# Patient Record
Sex: FEMALE | Race: WHITE | NOT HISPANIC OR LATINO | URBAN - METROPOLITAN AREA
[De-identification: names, ages, dates, MRNs, and addresses within clinical notes are randomized per-mention and may not be internally consistent; named-entity substitution may affect disease eponyms.]

---

## 2018-10-12 ENCOUNTER — EMERGENCY (EMERGENCY)
Facility: HOSPITAL | Age: 83
LOS: 1 days | Discharge: HOME | End: 2018-10-12
Attending: EMERGENCY MEDICINE | Admitting: EMERGENCY MEDICINE
Payer: MEDICARE

## 2018-10-12 VITALS
HEART RATE: 88 BPM | WEIGHT: 164.91 LBS | DIASTOLIC BLOOD PRESSURE: 76 MMHG | SYSTOLIC BLOOD PRESSURE: 181 MMHG | RESPIRATION RATE: 18 BRPM | TEMPERATURE: 98 F | OXYGEN SATURATION: 100 %

## 2018-10-12 VITALS
SYSTOLIC BLOOD PRESSURE: 108 MMHG | HEART RATE: 93 BPM | RESPIRATION RATE: 17 BRPM | TEMPERATURE: 99 F | OXYGEN SATURATION: 96 % | DIASTOLIC BLOOD PRESSURE: 67 MMHG

## 2018-10-12 DIAGNOSIS — R03.0 ELEVATED BLOOD-PRESSURE READING, WITHOUT DIAGNOSIS OF HYPERTENSION: ICD-10-CM

## 2018-10-12 DIAGNOSIS — E78.5 HYPERLIPIDEMIA, UNSPECIFIED: ICD-10-CM

## 2018-10-12 DIAGNOSIS — R41.3 OTHER AMNESIA: ICD-10-CM

## 2018-10-12 DIAGNOSIS — R53.1 WEAKNESS: ICD-10-CM

## 2018-10-12 LAB
ALBUMIN SERPL ELPH-MCNC: 4.6 G/DL — SIGNIFICANT CHANGE UP (ref 3.3–5)
ALP SERPL-CCNC: 66 U/L — SIGNIFICANT CHANGE UP (ref 40–120)
ALT FLD-CCNC: 11 U/L — SIGNIFICANT CHANGE UP (ref 10–45)
ANION GAP SERPL CALC-SCNC: 15 MMOL/L — SIGNIFICANT CHANGE UP (ref 5–17)
APTT BLD: 36.1 SEC — SIGNIFICANT CHANGE UP (ref 27.5–37.4)
AST SERPL-CCNC: 17 U/L — SIGNIFICANT CHANGE UP (ref 10–40)
BASOPHILS NFR BLD AUTO: 0.6 % — SIGNIFICANT CHANGE UP (ref 0–2)
BILIRUB SERPL-MCNC: 0.2 MG/DL — SIGNIFICANT CHANGE UP (ref 0.2–1.2)
BUN SERPL-MCNC: 19 MG/DL — SIGNIFICANT CHANGE UP (ref 7–23)
CALCIUM SERPL-MCNC: 9.4 MG/DL — SIGNIFICANT CHANGE UP (ref 8.4–10.5)
CHLORIDE SERPL-SCNC: 98 MMOL/L — SIGNIFICANT CHANGE UP (ref 96–108)
CK MB CFR SERPL CALC: 1.7 NG/ML — SIGNIFICANT CHANGE UP (ref 0–6.7)
CK SERPL-CCNC: 79 U/L — SIGNIFICANT CHANGE UP (ref 25–170)
CO2 SERPL-SCNC: 27 MMOL/L — SIGNIFICANT CHANGE UP (ref 22–31)
CREAT SERPL-MCNC: 0.8 MG/DL — SIGNIFICANT CHANGE UP (ref 0.5–1.3)
EOSINOPHIL NFR BLD AUTO: 2.7 % — SIGNIFICANT CHANGE UP (ref 0–6)
GLUCOSE SERPL-MCNC: 106 MG/DL — HIGH (ref 70–99)
HCT VFR BLD CALC: 37.3 % — SIGNIFICANT CHANGE UP (ref 34.5–45)
HGB BLD-MCNC: 12 G/DL — SIGNIFICANT CHANGE UP (ref 11.5–15.5)
INR BLD: 0.96 — SIGNIFICANT CHANGE UP (ref 0.88–1.16)
LYMPHOCYTES # BLD AUTO: 22.9 % — SIGNIFICANT CHANGE UP (ref 13–44)
MCHC RBC-ENTMCNC: 28 PG — SIGNIFICANT CHANGE UP (ref 27–34)
MCHC RBC-ENTMCNC: 32.2 G/DL — SIGNIFICANT CHANGE UP (ref 32–36)
MCV RBC AUTO: 87.1 FL — SIGNIFICANT CHANGE UP (ref 80–100)
MONOCYTES NFR BLD AUTO: 11.6 % — SIGNIFICANT CHANGE UP (ref 2–14)
NEUTROPHILS NFR BLD AUTO: 62.2 % — SIGNIFICANT CHANGE UP (ref 43–77)
PLATELET # BLD AUTO: 260 K/UL — SIGNIFICANT CHANGE UP (ref 150–400)
POTASSIUM SERPL-MCNC: 3.7 MMOL/L — SIGNIFICANT CHANGE UP (ref 3.5–5.3)
POTASSIUM SERPL-SCNC: 3.7 MMOL/L — SIGNIFICANT CHANGE UP (ref 3.5–5.3)
PROT SERPL-MCNC: 7.6 G/DL — SIGNIFICANT CHANGE UP (ref 6–8.3)
PROTHROM AB SERPL-ACNC: 10.7 SEC — SIGNIFICANT CHANGE UP (ref 9.8–12.7)
RBC # BLD: 4.28 M/UL — SIGNIFICANT CHANGE UP (ref 3.8–5.2)
RBC # FLD: 15 % — SIGNIFICANT CHANGE UP (ref 10.3–16.9)
SODIUM SERPL-SCNC: 140 MMOL/L — SIGNIFICANT CHANGE UP (ref 135–145)
TROPONIN T SERPL-MCNC: <0.01 NG/ML — SIGNIFICANT CHANGE UP (ref 0–0.01)
WBC # BLD: 5.2 K/UL — SIGNIFICANT CHANGE UP (ref 3.8–10.5)
WBC # FLD AUTO: 5.2 K/UL — SIGNIFICANT CHANGE UP (ref 3.8–10.5)

## 2018-10-12 PROCEDURE — 85730 THROMBOPLASTIN TIME PARTIAL: CPT

## 2018-10-12 PROCEDURE — 70450 CT HEAD/BRAIN W/O DYE: CPT | Mod: 26,59

## 2018-10-12 PROCEDURE — 70496 CT ANGIOGRAPHY HEAD: CPT

## 2018-10-12 PROCEDURE — 99284 EMERGENCY DEPT VISIT MOD MDM: CPT | Mod: 25

## 2018-10-12 PROCEDURE — 93005 ELECTROCARDIOGRAM TRACING: CPT

## 2018-10-12 PROCEDURE — 82550 ASSAY OF CK (CPK): CPT

## 2018-10-12 PROCEDURE — 36415 COLL VENOUS BLD VENIPUNCTURE: CPT

## 2018-10-12 PROCEDURE — 93010 ELECTROCARDIOGRAM REPORT: CPT

## 2018-10-12 PROCEDURE — 70496 CT ANGIOGRAPHY HEAD: CPT | Mod: 26

## 2018-10-12 PROCEDURE — 82553 CREATINE MB FRACTION: CPT

## 2018-10-12 PROCEDURE — 85025 COMPLETE CBC W/AUTO DIFF WBC: CPT

## 2018-10-12 PROCEDURE — 70450 CT HEAD/BRAIN W/O DYE: CPT

## 2018-10-12 PROCEDURE — 80053 COMPREHEN METABOLIC PANEL: CPT

## 2018-10-12 PROCEDURE — 70498 CT ANGIOGRAPHY NECK: CPT | Mod: 26

## 2018-10-12 PROCEDURE — 85610 PROTHROMBIN TIME: CPT

## 2018-10-12 PROCEDURE — 84484 ASSAY OF TROPONIN QUANT: CPT

## 2018-10-12 PROCEDURE — 70498 CT ANGIOGRAPHY NECK: CPT

## 2018-10-12 RX ORDER — SODIUM CHLORIDE 9 MG/ML
1000 INJECTION INTRAMUSCULAR; INTRAVENOUS; SUBCUTANEOUS ONCE
Qty: 0 | Refills: 0 | Status: COMPLETED | OUTPATIENT
Start: 2018-10-12 | End: 2018-10-12

## 2018-10-12 RX ADMIN — SODIUM CHLORIDE 1000 MILLILITER(S): 9 INJECTION INTRAMUSCULAR; INTRAVENOUS; SUBCUTANEOUS at 16:12

## 2018-10-12 NOTE — ED PROVIDER NOTE - OBJECTIVE STATEMENT
85 y/o f with PMH of reflux, HLD, prior TIA presents to ED with elevated blood pressure associated with "feeling unwell".  As per patient for past five days, she has had memory issues and feels like being in a fog.  Denies headache, posterior neck pain, nausea, vomiting, extremity weakness, numbness.  Pt seen by audiologist today regarding hearing aids and requested her blood pressure to be taken and found it to be elevated and referred to ED for further mgmt.  No chest pain or shortness of breath.

## 2018-10-12 NOTE — PROGRESS NOTE ADULT - SUBJECTIVE AND OBJECTIVE BOX
HPI: 85 yo woman with long history of anxiety adn depression was at ENT today and was found to be hypertensive to 200 systolic and a rapid response was called.  I spoke with the anesthesiologist at [facility] who found no neurological deficit but wanted her to go to the ER.     In the ER /67 consistent with her range  Head CT no acute event and unchanged from multiple other scans in the past    She has seen neurology in the past for ?TIA's and takes aspirin which she continues to take.    She reports significant anxiety and lonliness since the death of her .    PAST MEDICAL & SURGICAL HISTORY:  anxiety  Depression  hyperlpiidemia    Meds  lexapro 20mg  buproprion 75mg  trazadone 100mg qhs  clonipin prn anxiety  Lipitor    Vital Signs Last 24 Hrs  T(C): 37.1 (12 Oct 2018 17:55), Max: 37.1 (12 Oct 2018 17:55)  T(F): 98.7 (12 Oct 2018 17:55), Max: 98.7 (12 Oct 2018 17:55)  HR: 93 (12 Oct 2018 17:55) (88 - 93)  BP: 108/67 (12 Oct 2018 17:55) (108/67 - 202/90)  BP(mean): --  RR: 17 (12 Oct 2018 17:55) (17 - 18)  SpO2: 96% (12 Oct 2018 17:55) (96% - 100%)    NAD  No JVD  Chest clear  CV RRR s1s2 no murmur  Abd soft  Ext no edema   Neuro non-focal    Impression: Moderate microvascular diseaseno evidence of acute   intracranial hemorrhage or acute transcortical infarction.     Impression: No evidence of carotid stenosis.   Anterolisthesis of C7 on T1.  Right apical opacification that may represent atelectasis or pneumonia.  Mild loss of height of the superior endplate of T2 and T4 vertebral   bodies and moderate loss of height of T5 and T6 vertebral bodies   consistent with compression fractures, age indeterminate.                     12.0   5.2   )-----------( 260      ( 12 Oct 2018 15:02 )             37.3   10-12    140  |  98  |  19  ----------------------------<  106<H>  3.7   |  27  |  0.80    Ca    9.4      12 Oct 2018 15:02    TPro  7.6  /  Alb  4.6  /  TBili  0.2  /  DBili  x   /  AST  17  /  ALT  11  /  AlkPhos  66  10-12

## 2018-10-12 NOTE — ED ADULT NURSE NOTE - CHPI ED NUR SYMPTOMS NEG
no fever/no nausea/no confusion/no blurred vision/no change in level of consciousness/no loss of consciousness/no numbness/no vomiting/no weakness/no dizziness

## 2018-10-12 NOTE — ED PROVIDER NOTE - PROGRESS NOTE DETAILS
Colleen: CTs with no emergent findings. No cough or leukocytosis to suggest pna, no indication for abx at this time. BP normalized without intervention. Pt seen by REJI Paul for DC with outpt follow up. The patient is stable for DC. They were advised to call their PMD for prompt outpatient follow up. Return precautions were discussed. The patient was advised to return to the ER for any concerning or worsening symptoms.

## 2018-10-12 NOTE — PROGRESS NOTE ADULT - ASSESSMENT
hemodynamically stable 89 yo woman with episode of HTN whiole having her hearing test  -no evidience of acute neurological event  -I psoke with daughter who reiterated anxiety  -pateint has been given the name of psych   =patient will follow up with me next week  -stable for dc from ER at this time

## 2018-10-12 NOTE — ED PROVIDER NOTE - MEDICAL DECISION MAKING DETAILS
Pt with high blood pressure and memory changes x 5 days, PE - no focal neuro deficits, labs wnl, ekg ? LVH, will obtain ct head and cta to r/o central cause of symptoms.

## 2018-10-12 NOTE — ED ADULT TRIAGE NOTE - CHIEF COMPLAINT QUOTE
Patient presents today with feeling unwell x1 week went to cardiologist referred to here. No obvious distress noted. Denies chest pain.

## 2018-10-12 NOTE — ED ADULT NURSE NOTE - OBJECTIVE STATEMENT
biba from MD office for elevated blood pressure.  Hx TIA, HTN.  Patient is A&Ox4, speaking clearly and coherently in full sentences.  Move all extremities equally.  Denies headache, dizziness, vision change.  EKG done.  Labs sent.  Cardiac / vital signs monitoring in place.  Awaiting lab results and further studies.  Patient remains in stable condition.  Continue to monitor.

## 2018-10-12 NOTE — ED ADULT NURSE NOTE - NSIMPLEMENTINTERV_GEN_ALL_ED
Implemented All Universal Safety Interventions:  [place] to call system. Call bell, personal items and telephone within reach. Instruct patient to call for assistance. Room bathroom lighting operational. Non-slip footwear when patient is off stretcher. Physically safe environment: no spills, clutter or unnecessary equipment. Stretcher in lowest position, wheels locked, appropriate side rails in place.

## 2018-12-01 ENCOUNTER — EMERGENCY (EMERGENCY)
Facility: HOSPITAL | Age: 83
LOS: 1 days | Discharge: HOME | End: 2018-12-01
Attending: EMERGENCY MEDICINE | Admitting: EMERGENCY MEDICINE
Payer: MEDICARE

## 2018-12-01 VITALS
WEIGHT: 143.96 LBS | SYSTOLIC BLOOD PRESSURE: 192 MMHG | DIASTOLIC BLOOD PRESSURE: 77 MMHG | TEMPERATURE: 98 F | HEART RATE: 71 BPM | RESPIRATION RATE: 18 BRPM | OXYGEN SATURATION: 97 %

## 2018-12-01 LAB
APPEARANCE UR: CLEAR — SIGNIFICANT CHANGE UP
APTT BLD: 26.7 SEC — LOW (ref 27.5–36.3)
BASOPHILS NFR BLD AUTO: 0.6 % — SIGNIFICANT CHANGE UP (ref 0–2)
BILIRUB UR-MCNC: NEGATIVE — SIGNIFICANT CHANGE UP
CK MB CFR SERPL CALC: 3.1 NG/ML — SIGNIFICANT CHANGE UP (ref 0–6.7)
CK SERPL-CCNC: 203 U/L — HIGH (ref 25–170)
COLOR SPEC: YELLOW — SIGNIFICANT CHANGE UP
DIFF PNL FLD: ABNORMAL
EOSINOPHIL NFR BLD AUTO: 2.2 % — SIGNIFICANT CHANGE UP (ref 0–6)
GLUCOSE UR QL: NEGATIVE — SIGNIFICANT CHANGE UP
HCT VFR BLD CALC: 38 % — SIGNIFICANT CHANGE UP (ref 34.5–45)
HGB BLD-MCNC: 12.3 G/DL — SIGNIFICANT CHANGE UP (ref 11.5–15.5)
INR BLD: 0.96 — SIGNIFICANT CHANGE UP (ref 0.88–1.16)
KETONES UR-MCNC: NEGATIVE — SIGNIFICANT CHANGE UP
LEUKOCYTE ESTERASE UR-ACNC: NEGATIVE — SIGNIFICANT CHANGE UP
LYMPHOCYTES # BLD AUTO: 15 % — SIGNIFICANT CHANGE UP (ref 13–44)
MCHC RBC-ENTMCNC: 28 PG — SIGNIFICANT CHANGE UP (ref 27–34)
MCHC RBC-ENTMCNC: 32.4 G/DL — SIGNIFICANT CHANGE UP (ref 32–36)
MCV RBC AUTO: 86.4 FL — SIGNIFICANT CHANGE UP (ref 80–100)
MONOCYTES NFR BLD AUTO: 11.4 % — SIGNIFICANT CHANGE UP (ref 2–14)
NEUTROPHILS NFR BLD AUTO: 70.8 % — SIGNIFICANT CHANGE UP (ref 43–77)
NITRITE UR-MCNC: NEGATIVE — SIGNIFICANT CHANGE UP
PH UR: 7 — SIGNIFICANT CHANGE UP (ref 5–8)
PLATELET # BLD AUTO: 333 K/UL — SIGNIFICANT CHANGE UP (ref 150–400)
PROT UR-MCNC: NEGATIVE MG/DL — SIGNIFICANT CHANGE UP
PROTHROM AB SERPL-ACNC: 10.8 SEC — SIGNIFICANT CHANGE UP (ref 10–12.9)
RBC # BLD: 4.4 M/UL — SIGNIFICANT CHANGE UP (ref 3.8–5.2)
RBC # FLD: 14.4 % — SIGNIFICANT CHANGE UP (ref 10.3–16.9)
SP GR SPEC: 1.01 — SIGNIFICANT CHANGE UP (ref 1–1.03)
TROPONIN T SERPL-MCNC: <0.01 NG/ML — SIGNIFICANT CHANGE UP (ref 0–0.01)
UROBILINOGEN FLD QL: 0.2 E.U./DL — SIGNIFICANT CHANGE UP
WBC # BLD: 8.1 K/UL — SIGNIFICANT CHANGE UP (ref 3.8–10.5)
WBC # FLD AUTO: 8.1 K/UL — SIGNIFICANT CHANGE UP (ref 3.8–10.5)

## 2018-12-01 PROCEDURE — 71046 X-RAY EXAM CHEST 2 VIEWS: CPT

## 2018-12-01 PROCEDURE — 81001 URINALYSIS AUTO W/SCOPE: CPT

## 2018-12-01 PROCEDURE — 93005 ELECTROCARDIOGRAM TRACING: CPT

## 2018-12-01 PROCEDURE — 99283 EMERGENCY DEPT VISIT LOW MDM: CPT | Mod: 25

## 2018-12-01 PROCEDURE — 99285 EMERGENCY DEPT VISIT HI MDM: CPT | Mod: 25

## 2018-12-01 PROCEDURE — 85730 THROMBOPLASTIN TIME PARTIAL: CPT

## 2018-12-01 PROCEDURE — 84484 ASSAY OF TROPONIN QUANT: CPT

## 2018-12-01 PROCEDURE — 85025 COMPLETE CBC W/AUTO DIFF WBC: CPT

## 2018-12-01 PROCEDURE — 82553 CREATINE MB FRACTION: CPT

## 2018-12-01 PROCEDURE — 85610 PROTHROMBIN TIME: CPT

## 2018-12-01 PROCEDURE — 93010 ELECTROCARDIOGRAM REPORT: CPT

## 2018-12-01 PROCEDURE — 71046 X-RAY EXAM CHEST 2 VIEWS: CPT | Mod: 26

## 2018-12-01 PROCEDURE — 82550 ASSAY OF CK (CPK): CPT

## 2018-12-01 PROCEDURE — 36415 COLL VENOUS BLD VENIPUNCTURE: CPT

## 2018-12-01 PROCEDURE — 80053 COMPREHEN METABOLIC PANEL: CPT

## 2018-12-01 RX ORDER — SODIUM CHLORIDE 9 MG/ML
1000 INJECTION INTRAMUSCULAR; INTRAVENOUS; SUBCUTANEOUS ONCE
Qty: 0 | Refills: 0 | Status: COMPLETED | OUTPATIENT
Start: 2018-12-01 | End: 2018-12-01

## 2018-12-01 RX ORDER — ATENOLOL 25 MG/1
50 TABLET ORAL ONCE
Qty: 0 | Refills: 0 | Status: COMPLETED | OUTPATIENT
Start: 2018-12-01 | End: 2018-12-01

## 2018-12-01 RX ORDER — AMLODIPINE BESYLATE 2.5 MG/1
2.5 TABLET ORAL ONCE
Qty: 0 | Refills: 0 | Status: COMPLETED | OUTPATIENT
Start: 2018-12-01 | End: 2018-12-01

## 2018-12-01 RX ADMIN — SODIUM CHLORIDE 1000 MILLILITER(S): 9 INJECTION INTRAMUSCULAR; INTRAVENOUS; SUBCUTANEOUS at 22:52

## 2018-12-01 RX ADMIN — AMLODIPINE BESYLATE 2.5 MILLIGRAM(S): 2.5 TABLET ORAL at 23:30

## 2018-12-01 RX ADMIN — ATENOLOL 50 MILLIGRAM(S): 25 TABLET ORAL at 23:30

## 2018-12-01 NOTE — ED ADULT NURSE NOTE - CHPI ED NUR SYMPTOMS NEG
no shortness of breath/no chest pain/no congestion/no chills/no diaphoresis/no fever/no vomiting/no nausea/no back pain

## 2018-12-01 NOTE — ED ADULT NURSE NOTE - NSIMPLEMENTINTERV_GEN_ALL_ED
Implemented All Fall Risk Interventions:  [place] to call system. Call bell, personal items and telephone within reach. Instruct patient to call for assistance. Room bathroom lighting operational. Non-slip footwear when patient is off stretcher. Physically safe environment: no spills, clutter or unnecessary equipment. Stretcher in lowest position, wheels locked, appropriate side rails in place. Provide visual cue, wrist band, yellow gown, etc. Monitor gait and stability. Monitor for mental status changes and reorient to person, place, and time. Review medications for side effects contributing to fall risk. Reinforce activity limits and safety measures with patient and family.

## 2018-12-01 NOTE — ED ADULT TRIAGE NOTE - OTHER COMPLAINTS
CC of htn, known to have HTN, she was at the party when she feels awkward. denies dizziness, no slurred speech, answers questions coherently.

## 2018-12-01 NOTE — ED ADULT NURSE NOTE - OBJECTIVE STATEMENT
patient presents to the ED with c/o elevated BP x 1 day, with episode of dizziness and HA. Denies LOC, CP, SOB or confusion. EKG in progress as well as blood work up. MD villegas pending. NAD noted patient is ambulatory with assistance. will continue to monitor.

## 2018-12-02 VITALS
RESPIRATION RATE: 16 BRPM | SYSTOLIC BLOOD PRESSURE: 179 MMHG | DIASTOLIC BLOOD PRESSURE: 65 MMHG | OXYGEN SATURATION: 100 % | HEART RATE: 73 BPM

## 2018-12-02 NOTE — ED PROVIDER NOTE - OBJECTIVE STATEMENT
83 y/o female with a PMHx of depression who was recently diagnosed with a new onset of high blood pressure is present with feeling overall lethargic and describes an indigestion type of sensation x3 days. Pt states she feels as if her blood pressure is high and her maid has been taking her bp daily with elevated pressures. She was recently started on 2.5 amlodipine x2 weeks ago and x3 days of atenolol. She denies the following: fever, chills, chest pain, sob, difficulty breathing, numbness/tingling of extremities, dizziness, headaches, confusion.

## 2018-12-02 NOTE — ED PROVIDER NOTE - ATTENDING CONTRIBUTION TO CARE
84 yof pw htn.  feels "indigestion sensation" x 3 days.  no pain, no sob, no nv, no unsteadiness.     agree w/ PA, noted hypertension, will check screening ekg, labs, no evidence of htn emergency, trial of PO meds, reassess

## 2018-12-02 NOTE — ED PROVIDER NOTE - MEDICAL DECISION MAKING DETAILS
83 y/o female here in ED c/o lethargy with elevated bp x3 days. HR nml. BP elevated. EKG without signs of ischemia or infarct without chest pain. No PE risk factors. Labs nml. CE negative. Do not suspect cardiac. No chest/back pain. Do not suspect dissection. Pt recently started on atenolol - possible lethargy with SE of medication. Spoke to Dr. Das plan to increased amlodipine to 5mg daily and 50mg of atenolol BID. Pt will fu with him on Monday. Pt aware of plan, is anxious to leave, requesting for dc. She will be going with  who lives with her. Any chest pain, sob, to return to ed, otherwise fu with Dr. Barry Monday.

## 2018-12-05 DIAGNOSIS — I10 ESSENTIAL (PRIMARY) HYPERTENSION: ICD-10-CM

## 2018-12-05 DIAGNOSIS — Z79.899 OTHER LONG TERM (CURRENT) DRUG THERAPY: ICD-10-CM

## 2018-12-05 DIAGNOSIS — R53.83 OTHER FATIGUE: ICD-10-CM

## 2019-01-09 NOTE — ED PROVIDER NOTE - NS ED ATTENDING STATEMENT MOD
-Try gabapentin--alternate with Aleve EVERY OTHER DAY  -Call in 2-3 weeks if gabapentin is not working  -Follow up 3 months   I have personally performed a face to face diagnostic evaluation on this patient. I have reviewed the ACP note and agree with the history, exam and plan of care, except as noted.

## 2019-05-08 ENCOUNTER — APPOINTMENT (OUTPATIENT)
Dept: OTOLARYNGOLOGY | Facility: CLINIC | Age: 84
End: 2019-05-08
Payer: MEDICARE

## 2019-05-08 VITALS
SYSTOLIC BLOOD PRESSURE: 131 MMHG | TEMPERATURE: 98.3 F | OXYGEN SATURATION: 98 % | HEART RATE: 66 BPM | DIASTOLIC BLOOD PRESSURE: 77 MMHG

## 2019-05-08 VITALS — BODY MASS INDEX: 24.8 KG/M2 | HEIGHT: 63 IN | WEIGHT: 140 LBS

## 2019-05-08 DIAGNOSIS — H61.20 IMPACTED CERUMEN, UNSPECIFIED EAR: ICD-10-CM

## 2019-05-08 PROCEDURE — 99202 OFFICE O/P NEW SF 15 MIN: CPT

## 2019-05-08 NOTE — HISTORY OF PRESENT ILLNESS
[de-identified] : 84 yo woman with b aural fullness and hl. no tinnitus. not a q`tip user. feels her ears are plugged and is referred to remove the cerumen. -sandy has not had this in the recent past. no fh rfelevant to cc

## 2019-05-08 NOTE — PHYSICAL EXAM
[de-identified] : r troy men rmesamra l partgially remoived - coud not tolerate witgh suctgion and curet [Normal] : no rashes

## 2019-05-13 ENCOUNTER — APPOINTMENT (OUTPATIENT)
Dept: OTOLARYNGOLOGY | Facility: CLINIC | Age: 84
End: 2019-05-13
Payer: MEDICARE

## 2019-05-13 VITALS
HEART RATE: 67 BPM | TEMPERATURE: 98.4 F | DIASTOLIC BLOOD PRESSURE: 58 MMHG | OXYGEN SATURATION: 99 % | SYSTOLIC BLOOD PRESSURE: 141 MMHG

## 2019-05-13 DIAGNOSIS — H61.23 IMPACTED CERUMEN, BILATERAL: ICD-10-CM

## 2019-05-13 DIAGNOSIS — H90.3 SENSORINEURAL HEARING LOSS, BILATERAL: ICD-10-CM

## 2019-05-13 PROCEDURE — 69210 REMOVE IMPACTED EAR WAX UNI: CPT

## 2019-05-13 PROCEDURE — 92550 TYMPANOMETRY & REFLEX THRESH: CPT

## 2019-05-13 PROCEDURE — 99213 OFFICE O/P EST LOW 20 MIN: CPT | Mod: 25

## 2019-05-13 PROCEDURE — 92557 COMPREHENSIVE HEARING TEST: CPT

## 2019-05-13 RX ORDER — TIMOLOL MALEATE 5 MG/ML
0.5 SOLUTION OPHTHALMIC
Qty: 5 | Refills: 0 | Status: ACTIVE | COMMUNITY
Start: 2019-05-08

## 2019-05-13 RX ORDER — NEBIVOLOL HYDROCHLORIDE 10 MG/1
10 TABLET ORAL
Qty: 30 | Refills: 0 | Status: ACTIVE | COMMUNITY
Start: 2019-05-08

## 2019-05-13 NOTE — HISTORY OF PRESENT ILLNESS
[de-identified] : followup 86 yo woman with l hl - she was found to have cerumen impaction and has been on the drops I recommended and she still feels the left aural fullness and hearing loss. no vertigo. she feels the hl is moderate in severity. no fh relevant to cc.

## 2019-05-13 NOTE — PROCEDURE
[Same] : same as the Pre Op Dx. [Cerumen Impaction] : Cerumen Impaction [] : Removal of Cerumen [FreeTextEntry6] : l>>r copoiusc erumen imapctions removed atrauamtgically with suciotn

## 2019-05-13 NOTE — PHYSICAL EXAM
[Normal] : mucosa is normal [de-identified] : l>r francy crawford rmeoved atrauamtically with suciotnm - tms intact

## 2019-05-22 ENCOUNTER — APPOINTMENT (OUTPATIENT)
Dept: OTOLARYNGOLOGY | Facility: CLINIC | Age: 84
End: 2019-05-22

## 2019-07-08 ENCOUNTER — INPATIENT (INPATIENT)
Facility: HOSPITAL | Age: 84
LOS: 3 days | Discharge: NURSING, REHAB OR LONG-TERM CARE | DRG: 493 | End: 2019-07-12
Attending: INTERNAL MEDICINE | Admitting: INTERNAL MEDICINE
Payer: MEDICARE

## 2019-07-08 VITALS
OXYGEN SATURATION: 90 % | SYSTOLIC BLOOD PRESSURE: 162 MMHG | TEMPERATURE: 98 F | DIASTOLIC BLOOD PRESSURE: 58 MMHG | RESPIRATION RATE: 24 BRPM | HEART RATE: 75 BPM | WEIGHT: 153.22 LBS

## 2019-07-08 DIAGNOSIS — M21.611 BUNION OF RIGHT FOOT: Chronic | ICD-10-CM

## 2019-07-08 DIAGNOSIS — T07.XXXA UNSPECIFIED MULTIPLE INJURIES, INITIAL ENCOUNTER: ICD-10-CM
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
## 2019-07-08 DIAGNOSIS — C50.919 MALIGNANT NEOPLASM OF UNSPECIFIED SITE OF UNSPECIFIED FEMALE BREAST: ICD-10-CM

## 2019-07-08 DIAGNOSIS — I10 ESSENTIAL (PRIMARY) HYPERTENSION: ICD-10-CM

## 2019-07-08 DIAGNOSIS — Z98.890 OTHER SPECIFIED POSTPROCEDURAL STATES: Chronic | ICD-10-CM

## 2019-07-08 LAB
ANION GAP SERPL CALC-SCNC: 10 MMOL/L — SIGNIFICANT CHANGE UP (ref 5–17)
APTT BLD: 34.7 SEC — SIGNIFICANT CHANGE UP (ref 27.5–36.3)
BUN SERPL-MCNC: 17 MG/DL — SIGNIFICANT CHANGE UP (ref 7–23)
CALCIUM SERPL-MCNC: 8.2 MG/DL — LOW (ref 8.4–10.5)
CHLORIDE SERPL-SCNC: 98 MMOL/L — SIGNIFICANT CHANGE UP (ref 96–108)
CO2 SERPL-SCNC: 25 MMOL/L — SIGNIFICANT CHANGE UP (ref 22–31)
CREAT SERPL-MCNC: 0.84 MG/DL — SIGNIFICANT CHANGE UP (ref 0.5–1.3)
GLUCOSE SERPL-MCNC: 138 MG/DL — HIGH (ref 70–99)
HCT VFR BLD CALC: 27.7 % — LOW (ref 34.5–45)
HGB BLD-MCNC: 8.8 G/DL — LOW (ref 11.5–15.5)
INR BLD: 1.08 — SIGNIFICANT CHANGE UP (ref 0.88–1.16)
MAGNESIUM SERPL-MCNC: 2.1 MG/DL — SIGNIFICANT CHANGE UP (ref 1.6–2.6)
MCHC RBC-ENTMCNC: 30.8 PG — SIGNIFICANT CHANGE UP (ref 27–34)
MCHC RBC-ENTMCNC: 31.8 GM/DL — LOW (ref 32–36)
MCV RBC AUTO: 96.9 FL — SIGNIFICANT CHANGE UP (ref 80–100)
NRBC # BLD: 0 /100 WBCS — SIGNIFICANT CHANGE UP (ref 0–0)
PLATELET # BLD AUTO: 239 K/UL — SIGNIFICANT CHANGE UP (ref 150–400)
POTASSIUM SERPL-MCNC: 3.9 MMOL/L — SIGNIFICANT CHANGE UP (ref 3.5–5.3)
POTASSIUM SERPL-SCNC: 3.9 MMOL/L — SIGNIFICANT CHANGE UP (ref 3.5–5.3)
PROTHROM AB SERPL-ACNC: 12.2 SEC — SIGNIFICANT CHANGE UP (ref 10–12.9)
RBC # BLD: 2.86 M/UL — LOW (ref 3.8–5.2)
RBC # FLD: 12.9 % — SIGNIFICANT CHANGE UP (ref 10.3–14.5)
SODIUM SERPL-SCNC: 133 MMOL/L — LOW (ref 135–145)
WBC # BLD: 9.01 K/UL — SIGNIFICANT CHANGE UP (ref 3.8–10.5)
WBC # FLD AUTO: 9.01 K/UL — SIGNIFICANT CHANGE UP (ref 3.8–10.5)

## 2019-07-08 PROCEDURE — 86077 PHYS BLOOD BANK SERV XMATCH: CPT

## 2019-07-08 PROCEDURE — 93010 ELECTROCARDIOGRAM REPORT: CPT

## 2019-07-08 PROCEDURE — 71045 X-RAY EXAM CHEST 1 VIEW: CPT | Mod: 26

## 2019-07-08 RX ORDER — ATORVASTATIN CALCIUM 80 MG/1
20 TABLET, FILM COATED ORAL AT BEDTIME
Refills: 0 | Status: DISCONTINUED | OUTPATIENT
Start: 2019-07-08 | End: 2019-07-12

## 2019-07-08 RX ORDER — BUPROPION HYDROCHLORIDE 150 MG/1
150 TABLET, EXTENDED RELEASE ORAL DAILY
Refills: 0 | Status: DISCONTINUED | OUTPATIENT
Start: 2019-07-08 | End: 2019-07-08

## 2019-07-08 RX ORDER — PANTOPRAZOLE SODIUM 20 MG/1
40 TABLET, DELAYED RELEASE ORAL
Refills: 0 | Status: DISCONTINUED | OUTPATIENT
Start: 2019-07-08 | End: 2019-07-12

## 2019-07-08 RX ORDER — TRAZODONE HCL 50 MG
50 TABLET ORAL AT BEDTIME
Refills: 0 | Status: DISCONTINUED | OUTPATIENT
Start: 2019-07-08 | End: 2019-07-12

## 2019-07-08 RX ORDER — AMLODIPINE BESYLATE 2.5 MG/1
10 TABLET ORAL DAILY
Refills: 0 | Status: DISCONTINUED | OUTPATIENT
Start: 2019-07-08 | End: 2019-07-12

## 2019-07-08 RX ORDER — ALPRAZOLAM 0.25 MG
0.5 TABLET ORAL AT BEDTIME
Refills: 0 | Status: DISCONTINUED | OUTPATIENT
Start: 2019-07-08 | End: 2019-07-12

## 2019-07-08 RX ORDER — ENOXAPARIN SODIUM 100 MG/ML
40 INJECTION SUBCUTANEOUS EVERY 24 HOURS
Refills: 0 | Status: DISCONTINUED | OUTPATIENT
Start: 2019-07-08 | End: 2019-07-09

## 2019-07-08 RX ORDER — BUPROPION HYDROCHLORIDE 150 MG/1
450 TABLET, EXTENDED RELEASE ORAL DAILY
Refills: 0 | Status: DISCONTINUED | OUTPATIENT
Start: 2019-07-08 | End: 2019-07-12

## 2019-07-08 RX ADMIN — Medication 50 MILLIGRAM(S): at 22:01

## 2019-07-08 RX ADMIN — Medication 0.5 MILLIGRAM(S): at 21:35

## 2019-07-08 RX ADMIN — ATORVASTATIN CALCIUM 20 MILLIGRAM(S): 80 TABLET, FILM COATED ORAL at 22:02

## 2019-07-08 NOTE — PROGRESS NOTE ADULT - SUBJECTIVE AND OBJECTIVE BOX
HPI: pt is a 84 y.o F w/ a pmh of breast cancer who transferred from [facility] after having a mechanical fall and falling on her outstretched hands in her house in the [facility] on 7/5. She denies ever losing consciousness or hitting her head. She was admitted to [facility] where she says they did a CT of her head which came back negative. She does have a bandage on the right side of her head and her aid said she got stitches on the day of her fall. Ortho was consulted at [facility], but she wanted to come to [facility] for the surgery. She got transferred from [facility] on 7/8 to [facility]. (08 Jul 2019 17:33)    Her past medical history is notable for TIA on aspirin, htn on bystolic and amlodipine. anxiety on buproprion and alprazolam and night time trazadone, osteoporosis on prolia and DJD knees    PAST MEDICAL & SURGICAL HISTORY:  Breast cancer  History of lymph node excision: in axilla, around left breast 40 years ago  Bilateral bunions    MEDICATIONS  (STANDING):  ALPRAZolam 0.5 milliGRAM(s) Oral at bedtime  amLODIPine   Tablet 10 milliGRAM(s) Oral daily  atorvastatin 20 milliGRAM(s) Oral at bedtime  buPROPion XL . 450 milliGRAM(s) Oral daily  enoxaparin Injectable 40 milliGRAM(s) SubCutaneous every 24 hours  pantoprazole    Tablet 40 milliGRAM(s) Oral before breakfast  traZODone 50 milliGRAM(s) Oral at bedtime    MEDICATIONS  (PRN):    Vital Signs Last 24 Hrs  T(C): 36.9 (08 Jul 2019 21:00), Max: 36.9 (08 Jul 2019 17:35)  T(F): 98.4 (08 Jul 2019 21:00), Max: 98.4 (08 Jul 2019 17:35)  HR: 87 (08 Jul 2019 21:00) (75 - 87)  BP: 166/68 (08 Jul 2019 21:00) (162/58 - 166/68)  BP(mean): --  RR: 20 (08 Jul 2019 21:00) (20 - 24)  SpO2: 93% (08 Jul 2019 21:00) (90% - 93%)    NAD  No JVD  Chest clear  CV RRR s1s2 II/VI GOMEZ LSB  Abd soft  Ext LUE edema                          8.8    9.01  )-----------( 239      ( 08 Jul 2019 20:41 )             27.7   07-08    133<L>  |  98  |  17  ----------------------------<  138<H>  3.9   |  25  |  0.84    Ca    8.2<L>      08 Jul 2019 20:41  Mg     2.1     07-08    PT/INR - ( 08 Jul 2019 20:41 )   PT: 12.2 sec;   INR: 1.08          PTT - ( 08 Jul 2019 20:41 )  PTT:34.7 sec

## 2019-07-08 NOTE — H&P ADULT - NSICDXPASTSURGICALHX_GEN_ALL_CORE_FT
PAST SURGICAL HISTORY:  Bilateral bunions     History of lymph node excision in axilla, around left breast 40 years ago

## 2019-07-08 NOTE — H&P ADULT - HISTORY OF PRESENT ILLNESS
pt is a 84 y.o F w/ a pmh of breast pt is a 84 y.o F w/ a pmh of breast cancer who transferred from [facility] after having a mechanical fall and falling on her outstretched hands in her house in the [facility] on 7/5. She denies ever losing consciousness or hitting her head. She was admitted to [facility] where she says they did a CT of her head which came back negative. Ortho was consulted at [facility], but she wanted to come to [facility] for the surgery. She got transferred from [facility] on 7/8 to [facility]. pt is a 84 y.o F w/ a pmh of breast cancer who transferred from [facility] after having a mechanical fall and falling on her outstretched hands in her house in the [facility] on 7/5. She denies ever losing consciousness or hitting her head. She was admitted to [facility] where she says they did a CT of her head which came back negative. She does have a bandage on the right side of her head and her aid said she got stitches on the day of her fall. Ortho was consulted at [facility], but she wanted to come to [facility] for the surgery. She got transferred from [facility] on 7/8 to [facility].

## 2019-07-08 NOTE — H&P ADULT - PROBLEM SELECTOR PLAN 3
40 years ago, no mastectomy but removed lymph nodes out of left side, had chemo bp 162/58  -c/o home amlodipine 10 mg + Bystolic 10 mg

## 2019-07-08 NOTE — H&P ADULT - NSHPPHYSICALEXAM_GEN_ALL_CORE
General: Pt denies headache, changes in vision, nausea or vomiting  Skin/Breast: some erythema on the left elbow area above her wrapped arm  Ophthalmologic: Normal pupillary response to light  ENMT: Dry oral mucosa  Respiratory: CTAB b/l, no wheezing or crackles  Cardiovascular: RRR, S1+S2, loud systolic murmur in left upper sternal border	  Gastrointestinal: soft abdomen, no distension, normal bowel sounds, no rebound tenderness or gaurding	  Genitourinary:	    Musculoskeletal:	    Neurological:	    Psychiatric:	    Hematology/Lymphatics:	    Endocrine:	    Allergic/Immunologic: General: NAD, resting in bed comfortably, both hands wrapped up, has a bandage on her left side of forehead   Skin/Breast: some erythema on the left elbow area above her wrapped arm  Ophthalmologic: Normal pupillary response to light  ENMT: Dry oral mucosa  Respiratory: CTAB b/l, no wheezing or crackles  Cardiovascular: RRR, S1+S2, loud systolic murmur in left upper sternal border	  Gastrointestinal: soft abdomen, no distension, normal bowel sounds, no rebound tenderness or guarding		  Musculoskeletal: Able to move fingers b/l, good capillary refill in fingers	  Neurological: A+O x3, CN 2-12 Intact

## 2019-07-08 NOTE — H&P ADULT - PROBLEM SELECTOR PLAN 2
bp 162/58  -c/o home amlodipine 10 mg bp 162/58  -c/o home amlodipine 10 mg + Bystolic 10 mg pt fell at home in [facility] 7/5, admitted, x rays showed b/l wrist fracture, transferred to [facility] for hand surgery per Dr. Barry will consult Dr. Win Stewart  -X ray wrists ordered  -Pt denies hitting head, but has bandage on right side of head, says CT was neg at [facility] for any bleed

## 2019-07-08 NOTE — PROGRESS NOTE ADULT - ASSESSMENT
Hemodynamically stable s/p fall iwth left rist and right elbow fx  =evaluation by Dr. ZOILA Stewart to determine surgical treatment  -pain control  -DVT prophylaxis  =anxiety - continue home meds  =PT  -preop - needs CXR, UA, and EKG  -Dispo pending clinical course

## 2019-07-08 NOTE — H&P ADULT - PROBLEM SELECTOR PLAN 1
pt fell at home in [facility] 7/5, admitted, x rays showed b/l wrist fracture, transferred to [facility] for hand surgery  -X ray wrists ordered  -NPO midnight for surgery tomorrow  -Pt denies hitting head, but has bandage on right side of head, says CT was neg at [facility] for any bleed pt fell at home in [facility] 7/5, admitted, x rays showed b/l wrist fracture, transferred to [facility] for hand surgery per Dr. Barry will consult Dr. Win Stewart  -X ray wrists ordered  -Pt denies hitting head, but has bandage on right side of head, says CT was neg at [facility] for any bleed Imaging done at [facility] 7/5:    Xray: Left forearm and wrist- comminuted intra-articular fracture of the distal radius w/ left tissue swelling    Xray: Right elbow- moderate displaced fracture of distal humerus w/ associated effusion, hemarthrosis right elbow, fracture extends to both medial and lateral epicondyles    CT abd/pelvis: fracure left medial clavicle, manubrium    CT Cervical spine: degenerative changes, C7-T1 anterolithiasis, compression deformity T4    CT brain w/o contrast: No hemorrhage, only involutional and microvascular changes Ortho following...Imaging done at [place] 7/5:    Xray: Left forearm and wrist- comminuted intra-articular fracture of the distal radius w/ left tissue swelling    Xray: Right elbow- moderate displaced fracture of distal humerus w/ associated effusion, hemarthrosis right elbow, fracture extends to both medial and lateral epicondyles    CT abd/pelvis: fracure left medial clavicle, manubrium    CT Cervical spine: degenerative changes, C7-T1 anterolithiasis, compression deformity T4    CT brain w/o contrast: No hemorrhage, only involutional and microvascular changes

## 2019-07-09 DIAGNOSIS — Z29.9 ENCOUNTER FOR PROPHYLACTIC MEASURES, UNSPECIFIED: ICD-10-CM

## 2019-07-09 DIAGNOSIS — Z01.818 ENCOUNTER FOR OTHER PREPROCEDURAL EXAMINATION: ICD-10-CM

## 2019-07-09 DIAGNOSIS — R63.8 OTHER SYMPTOMS AND SIGNS CONCERNING FOOD AND FLUID INTAKE: ICD-10-CM

## 2019-07-09 LAB
APPEARANCE UR: CLEAR — SIGNIFICANT CHANGE UP
BILIRUB UR-MCNC: NEGATIVE — SIGNIFICANT CHANGE UP
COLOR SPEC: YELLOW — SIGNIFICANT CHANGE UP
DIFF PNL FLD: ABNORMAL
GLUCOSE UR QL: NEGATIVE — SIGNIFICANT CHANGE UP
KETONES UR-MCNC: NEGATIVE — SIGNIFICANT CHANGE UP
LEUKOCYTE ESTERASE UR-ACNC: NEGATIVE — SIGNIFICANT CHANGE UP
NITRITE UR-MCNC: NEGATIVE — SIGNIFICANT CHANGE UP
PH UR: 6 — SIGNIFICANT CHANGE UP (ref 5–8)
PROT UR-MCNC: ABNORMAL MG/DL
SP GR SPEC: 1.01 — SIGNIFICANT CHANGE UP (ref 1–1.03)
UROBILINOGEN FLD QL: 0.2 E.U./DL — SIGNIFICANT CHANGE UP

## 2019-07-09 PROCEDURE — 73100 X-RAY EXAM OF WRIST: CPT | Mod: 26,50

## 2019-07-09 PROCEDURE — 73030 X-RAY EXAM OF SHOULDER: CPT | Mod: 26,50

## 2019-07-09 PROCEDURE — 73200 CT UPPER EXTREMITY W/O DYE: CPT | Mod: 26,RT

## 2019-07-09 PROCEDURE — 73080 X-RAY EXAM OF ELBOW: CPT | Mod: 26,50

## 2019-07-09 RX ORDER — ATORVASTATIN CALCIUM 80 MG/1
1 TABLET, FILM COATED ORAL
Qty: 30 | Refills: 0
Start: 2019-07-09 | End: 2019-08-07

## 2019-07-09 RX ORDER — NEBIVOLOL HYDROCHLORIDE 5 MG/1
10 TABLET ORAL DAILY
Refills: 0 | Status: DISCONTINUED | OUTPATIENT
Start: 2019-07-09 | End: 2019-07-12

## 2019-07-09 RX ORDER — ACETAMINOPHEN 500 MG
650 TABLET ORAL EVERY 6 HOURS
Refills: 0 | Status: DISCONTINUED | OUTPATIENT
Start: 2019-07-09 | End: 2019-07-12

## 2019-07-09 RX ADMIN — BUPROPION HYDROCHLORIDE 450 MILLIGRAM(S): 150 TABLET, EXTENDED RELEASE ORAL at 12:26

## 2019-07-09 RX ADMIN — PANTOPRAZOLE SODIUM 40 MILLIGRAM(S): 20 TABLET, DELAYED RELEASE ORAL at 06:24

## 2019-07-09 RX ADMIN — AMLODIPINE BESYLATE 10 MILLIGRAM(S): 2.5 TABLET ORAL at 06:24

## 2019-07-09 NOTE — PROGRESS NOTE ADULT - ASSESSMENT
83 y/o F w/ a PMHx of breast cancer and hypertension who transferred from [facility] after having a mechanical fall and falling on her outstretched hands in her friend's house in the [facility] on 7/5/19. Pt does not have imaging done with her from [facility]. Patient pending Xray of b/l wrist and prep for hand surgery today.

## 2019-07-09 NOTE — PROGRESS NOTE ADULT - PROBLEM SELECTOR PLAN 3
/70- 166/68  - c/o home amlodipine 10 mg + Bystolic 10 mg /70- 166/68  - c/o home amlodipine 10 mg /70- 166/68  - c/o home amlodipine 10 mg + bystolic 10 mg qd

## 2019-07-09 NOTE — PROGRESS NOTE ADULT - ASSESSMENT
Hemodynamically stable and medically optimized to proceed to surgery at this time  -normal EKG  -labs reviewed  -UA normal  -DVT prophylaxis  -blood pressure controlled  =anxiety controlled

## 2019-07-09 NOTE — PROGRESS NOTE ADULT - PROBLEM SELECTOR PLAN 1
- Ortho is following  - Imaging done at [facility] 7/5:  Xray: Left forearm and wrist- comminuted intra-articular fracture of the distal radius w/ left tissue swelling    Xray: Right elbow- moderate displaced fracture of distal humerus w/ associated effusion, hemarthrosis right elbow, fracture extends to both medial and lateral epicondyles    CT abd/pelvis: fracure left medial clavicle, manubrium  CT Cervical spine: degenerative changes, C7-T1 anterolithiasis, compression deformity T4  CT brain w/o contrast: No hemorrhage, only involutional and microvascular changes  - Surgery is currently pending Xray of b/l wrist today - Ortho is following  - Imaging done at [facility] 7/5:  Xray: Left forearm and wrist- comminuted intra-articular fracture of the distal radius w/ left tissue swelling  Xray: Right elbow- moderate displaced fracture of distal humerus w/ associated effusion, hemarthrosis right elbow, fracture extends to both medial and lateral epicondyles    Fracture left medial clavicle, manubrium  CT Cervical spine: degenerative changes, C7-T1 anterolithiasis, compression deformity T4  CT brain w/o contrast: No hemorrhage, only involutional and microvascular changes  - Surgery is currently pending Xray of b/l wrist today - Ortho is following  -  Xray of b/l wrist today. Results pending  - Scheduled for CT noncontrast of bilateral elbow  - Surgery is currently pending - Ortho is following  -  Xray of b/l wrist today. Results pending  - per ortho, ordered for CT noncontrast of R elbow and L wrist,   - Surgery is currently pending - Ortho is following  -  Xray of b/l wrist, elbow and shoulder performed today  - per ortho, ordered for CT noncontrast of R elbow and L wrist  - scheduled for surgery 9pm [facility]  - Surgery is currently pending

## 2019-07-09 NOTE — PROGRESS NOTE ADULT - SUBJECTIVE AND OBJECTIVE BOX
No events  Awaiting surgery  Pain controlled  No BM    PAST MEDICAL & SURGICAL HISTORY:  Breast cancer  History of lymph node excision: in axilla, around left breast 40 years ago  Bilateral bunions    MEDICATIONS  (STANDING):  ALPRAZolam 0.5 milliGRAM(s) Oral at bedtime  amLODIPine   Tablet 10 milliGRAM(s) Oral daily  atorvastatin 20 milliGRAM(s) Oral at bedtime  buPROPion XL . 450 milliGRAM(s) Oral daily  nebivolol 10 milliGRAM(s) Oral daily  pantoprazole    Tablet 40 milliGRAM(s) Oral before breakfast  traZODone 50 milliGRAM(s) Oral at bedtime    MEDICATIONS  (PRN):  acetaminophen   Tablet .. 650 milliGRAM(s) Oral every 6 hours PRN Moderate Pain (4 - 6)  guaiFENesin    Syrup 200 milliGRAM(s) Oral every 6 hours PRN phlegm    Vital Signs Last 24 Hrs  T(C): 36.8 (09 Jul 2019 16:24), Max: 37.1 (09 Jul 2019 12:21)  T(F): 98.3 (09 Jul 2019 16:24), Max: 98.8 (09 Jul 2019 12:21)  HR: 91 (09 Jul 2019 16:24) (82 - 91)  BP: 169/63 (09 Jul 2019 16:24) (116/67 - 169/63)  BP(mean): --  RR: 20 (09 Jul 2019 16:24) (18 - 22)  SpO2: 92% (09 Jul 2019 16:24) (91% - 94%)    NAD  No JVD  Chest Clear  CV RRR s1s2 II/VI GOMEZ  LSB  Abd soft  Ext no lower ext edema                          8.8    9.01  )-----------( 239      ( 08 Jul 2019 20:41 )             27.7   07-08    133<L>  |  98  |  17  ----------------------------<  138<H>  3.9   |  25  |  0.84    Ca    8.2<L>      08 Jul 2019 20:41  Mg     2.1     07-08    PT/INR - ( 08 Jul 2019 20:41 )   PT: 12.2 sec;   INR: 1.08          PTT - ( 08 Jul 2019 20:41 )  PTT:34.7 sec

## 2019-07-09 NOTE — CONSULT NOTE ADULT - SUBJECTIVE AND OBJECTIVE BOX
Pt Name: CHECO RICE  MRN: [id number]    The patient was seen and examined. 84F w/ a pmh of breast cancer who transferred from [facility] after having a mechanical fall and falling on her outstretched hands in her house in the [facility] on 7/5. She denies ever losing consciousness or hitting her head. Ct at OSH negative. At her OSH she was diagnosed with a L wrist fracture and R elbow fx, placed in a sugartong on left and posterior slab on right. Transferred to [facility] for operative evaluation and fixation. Patient denies any numbness/tingling. States pain in L wrist, R elbow, and bilateral shoulders. No XR imaging for comparison.     ROS is otherwise negative.  PAST MEDICAL & SURGICAL HISTORY:  Breast cancer  History of lymph node excision: in axilla, around left breast 40 years ago  Bilateral bunions      Allergies: NKDA    Medications:  ALPRAZolam 0.5 milliGRAM(s) Oral at bedtime  amLODIPine   Tablet 10 milliGRAM(s) Oral daily  atorvastatin 20 milliGRAM(s) Oral at bedtime  buPROPion XL . 450 milliGRAM(s) Oral daily  enoxaparin Injectable 40 milliGRAM(s) SubCutaneous every 24 hours  pantoprazole    Tablet 40 milliGRAM(s) Oral before breakfast  traZODone 50 milliGRAM(s) Oral at bedtime      Social History:  Ambulation: Walking independently    PHYSICAL EXAM:    T(C): 36.9 (07-08-19 @ 21:00), Max: 36.9 (07-08-19 @ 17:35)  HR: 87 (07-08-19 @ 21:00) (75 - 87)  BP: 166/68 (07-08-19 @ 21:00) (162/58 - 166/68)  RR: 20 (07-08-19 @ 21:00) (20 - 24)  SpO2: 93% (07-08-19 @ 21:00) (90% - 93%)  Wt(kg): --    Gen: well developed, well nourished, comfortable  Affected extremity:   No open fractures at L wrist or R elbow  L wrist in sugartong splint, hand swollen, SILT median/radial/ulnar. firing AIN/PIN/ulnar. fingers wwp, cap refill <2 sec    R elbow in posterior slab  firing AIN/PIN/ulnar  SILT radial/median/ulnar  fingers wwp, cap refill < 2sec     Labs:                        8.8    9.01  )-----------( 239      ( 08 Jul 2019 20:41 )             27.7     07-08    133<L>  |  98  |  17  ----------------------------<  138<H>  3.9   |  25  |  0.84    Ca    8.2<L>      08 Jul 2019 20:41  Mg     2.1     07-08        A/P  Pt is a 86yo Female s/p ?L wrist and R elbow fx, splinted at OSH, pending XR imaging for confirmation   -JUAN JOSEPH  -f/u XR of bilateral wrist, elbow, shoulder for possible operative intervention  d/w attending on call Dr. Stewart

## 2019-07-09 NOTE — PROGRESS NOTE ADULT - SUBJECTIVE AND OBJECTIVE BOX
NOTE INCOMPLETE    Saw patient at bedside. In no acute distress. States that Tylenol is controlling her pain. Patient refused Xrays done overnight because it was too early. She agreed to have them done this morning. Denies any chest pain, SOB, nausea, vomiting. Reports a slight cough since her hospitalization at [facility] a few days ago in which she could not bring up her phlegm.    VITAL SIGNS:  Vital Signs Last 24 Hrs  T(C): 36.3 (09 Jul 2019 06:05), Max: 36.9 (08 Jul 2019 17:35)  T(F): 97.3 (09 Jul 2019 06:05), Max: 98.4 (08 Jul 2019 17:35)  HR: 85 (09 Jul 2019 06:05) (75 - 87)  BP: 152/70 (09 Jul 2019 06:05) (152/70 - 166/68)  BP(mean): --  RR: 18 (09 Jul 2019 06:05) (18 - 24)  SpO2: 94% (09 Jul 2019 06:05) (90% - 94%)    PHYSICAL EXAM:    General: lying in bed in no acute distress  HEENT: normocephalic, atraumatic; PERRL, anicteric sclera; MMM; R head with gauze  Neck: supple, no JVD  Cardiovascular: +S1/S2, RRR, 2/ systolic ejection murmur at LSB  Respiratory: clear to auscultation B/L; no wheezing, no rales, no rhonchi  Gastrointestinal: soft, NT/ND; +BSx4, no organomegaly  Extremities: WWP; no edema in b/l lower extremities.   Vascular: 2+ radial, DP pulses B/L  Neurological: AAOx3; 5/5 muscle strength in b/l lower extremities. Upper extremity exam deferred    MEDICATIONS:  MEDICATIONS  (STANDING):  ALPRAZolam 0.5 milliGRAM(s) Oral at bedtime  amLODIPine   Tablet 10 milliGRAM(s) Oral daily  atorvastatin 20 milliGRAM(s) Oral at bedtime  buPROPion XL . 450 milliGRAM(s) Oral daily  enoxaparin Injectable 40 milliGRAM(s) SubCutaneous every 24 hours  pantoprazole    Tablet 40 milliGRAM(s) Oral before breakfast  traZODone 50 milliGRAM(s) Oral at bedtime    MEDICATIONS  (PRN):      ALLERGIES:  Allergies    No Known Allergies    Intolerances        LABS:                        8.8    9.01  )-----------( 239      ( 08 Jul 2019 20:41 )             27.7     07-08    133<L>  |  98  |  17  ----------------------------<  138<H>  3.9   |  25  |  0.84    Ca    8.2<L>      08 Jul 2019 20:41  Mg     2.1     07-08      PT/INR - ( 08 Jul 2019 20:41 )   PT: 12.2 sec;   INR: 1.08          PTT - ( 08 Jul 2019 20:41 )  PTT:34.7 sec    CAPILLARY BLOOD GLUCOSE          RADIOLOGY & ADDITIONAL TESTS: Reviewed. NOTE INCOMPLETE    Saw patient at bedside. In no acute distress. States that Tylenol is controlling her pain. Patient refused Xrays done overnight because it was too early. She agreed to have them done this morning. Denies any chest pain, SOB, nausea, vomiting. Reports a slight cough since her hospitalization at [facility] a few days ago in which she could not bring up her phlegm.    VITAL SIGNS:  Vital Signs Last 24 Hrs  T(C): 36.3 (09 Jul 2019 06:05), Max: 36.9 (08 Jul 2019 17:35)  T(F): 97.3 (09 Jul 2019 06:05), Max: 98.4 (08 Jul 2019 17:35)  HR: 85 (09 Jul 2019 06:05) (75 - 87)  BP: 152/70 (09 Jul 2019 06:05) (152/70 - 166/68)  BP(mean): --  RR: 18 (09 Jul 2019 06:05) (18 - 24)  SpO2: 94% (09 Jul 2019 06:05) (90% - 94%)    PHYSICAL EXAM:    General: lying in bed in no acute distress  HEENT: normocephalic, atraumatic; PERRL, anicteric sclera; MMM; R head with gauze  Neck: supple, no JVD  Cardiovascular: +S1/S2, RRR, 2/ systolic ejection murmur at LSB  Respiratory: clear to auscultation B/L; no wheezing, no rales, no rhonchi  Gastrointestinal: soft, NT/ND; +BSx4, no organomegaly  Extremities: WWP; no edema in b/l lower extremities. B/l upper extremities in splints  Vascular: 2+ radial, DP pulses B/L  Neurological: AAOx3; 5/5 muscle strength in b/l lower extremities. Upper extremity exam deferred    MEDICATIONS:  MEDICATIONS  (STANDING):  ALPRAZolam 0.5 milliGRAM(s) Oral at bedtime  amLODIPine   Tablet 10 milliGRAM(s) Oral daily  atorvastatin 20 milliGRAM(s) Oral at bedtime  buPROPion XL . 450 milliGRAM(s) Oral daily  enoxaparin Injectable 40 milliGRAM(s) SubCutaneous every 24 hours  pantoprazole    Tablet 40 milliGRAM(s) Oral before breakfast  traZODone 50 milliGRAM(s) Oral at bedtime    MEDICATIONS  (PRN):      ALLERGIES:  Allergies    No Known Allergies    Intolerances        LABS:                        8.8    9.01  )-----------( 239      ( 08 Jul 2019 20:41 )             27.7     07-08    133<L>  |  98  |  17  ----------------------------<  138<H>  3.9   |  25  |  0.84    Ca    8.2<L>      08 Jul 2019 20:41  Mg     2.1     07-08      PT/INR - ( 08 Jul 2019 20:41 )   PT: 12.2 sec;   INR: 1.08          PTT - ( 08 Jul 2019 20:41 )  PTT:34.7 sec    CAPILLARY BLOOD GLUCOSE          RADIOLOGY & ADDITIONAL TESTS: Reviewed. Saw patient at bedside. In no acute distress. States that Tylenol is controlling her pain. Patient refused Xrays done overnight because it was too early. She agreed to have them done this morning. Denies any chest pain, SOB, nausea, vomiting. Reports a slight cough since her hospitalization at [facility] a few days ago in which she could not bring up her phlegm.    VITAL SIGNS:  Vital Signs Last 24 Hrs  T(C): 36.3 (09 Jul 2019 06:05), Max: 36.9 (08 Jul 2019 17:35)  T(F): 97.3 (09 Jul 2019 06:05), Max: 98.4 (08 Jul 2019 17:35)  HR: 85 (09 Jul 2019 06:05) (75 - 87)  BP: 152/70 (09 Jul 2019 06:05) (152/70 - 166/68)  BP(mean): --  RR: 18 (09 Jul 2019 06:05) (18 - 24)  SpO2: 94% (09 Jul 2019 06:05) (90% - 94%)    PHYSICAL EXAM:    General: lying in bed in no acute distress  HEENT: normocephalic, atraumatic; PERRL, anicteric sclera; MMM; R head with gauze  Neck: supple, no JVD  Cardiovascular: +S1/S2, RRR, 2/ systolic ejection murmur at LSB  Respiratory: clear to auscultation B/L; no wheezing, no rales, no rhonchi  Gastrointestinal: soft, NT/ND; +BSx4, no organomegaly  Extremities: WWP; no edema in b/l lower extremities. B/l upper extremities in splints  Vascular: 2+ radial, DP pulses B/L  Neurological: AAOx3; 5/5 muscle strength in b/l lower extremities. Upper extremity exam deferred    MEDICATIONS:  MEDICATIONS  (STANDING):  ALPRAZolam 0.5 milliGRAM(s) Oral at bedtime  amLODIPine   Tablet 10 milliGRAM(s) Oral daily  atorvastatin 20 milliGRAM(s) Oral at bedtime  buPROPion XL . 450 milliGRAM(s) Oral daily  enoxaparin Injectable 40 milliGRAM(s) SubCutaneous every 24 hours  pantoprazole    Tablet 40 milliGRAM(s) Oral before breakfast  traZODone 50 milliGRAM(s) Oral at bedtime    MEDICATIONS  (PRN):      ALLERGIES:  Allergies    No Known Allergies    Intolerances        LABS:                        8.8    9.01  )-----------( 239      ( 08 Jul 2019 20:41 )             27.7     07-08    133<L>  |  98  |  17  ----------------------------<  138<H>  3.9   |  25  |  0.84    Ca    8.2<L>      08 Jul 2019 20:41  Mg     2.1     07-08      PT/INR - ( 08 Jul 2019 20:41 )   PT: 12.2 sec;   INR: 1.08          PTT - ( 08 Jul 2019 20:41 )  PTT:34.7 sec    CAPILLARY BLOOD GLUCOSE          RADIOLOGY & ADDITIONAL TESTS: Reviewed.

## 2019-07-09 NOTE — PROGRESS NOTE ADULT - PROBLEM SELECTOR PLAN 2
pt fell at friend's home in [facility] 7/5, admitted, x rays showed b/l wrist fracture, transferred to [facility] for hand surgery per Dr. Barry will consult Dr. Win Stewart  -X ray wrists ordered and performed today. Results pending  -Pt denies hitting head, but has bandage on right side of head, says CT was neg at [facility] for any bleed Pt fell at friend's home in [facility] 7/5, admitted, x rays showed b/l wrist fracture, transferred to [facility] for hand surgery per Dr. Baryr and will consult Dr. Win Stewart  -X ray wrists ordered and performed today. Results pending  -Pt denies hitting head, but has bandage on right side of head, says CT was neg at [facility] for any bleed Pt fell at friend's home in [facility] 7/5, admitted, x rays showed b/l wrist fracture, transferred to [facility] for hand surgery per Dr. Barry and will consult Dr. Win Stewart  -X ray wrists, elbow and shoulder performed today  -Pt denies hitting head, but has bandage on right side of head, says CT was neg at [facility] for any bleed

## 2019-07-10 PROCEDURE — 73110 X-RAY EXAM OF WRIST: CPT | Mod: 26,LT

## 2019-07-10 PROCEDURE — 73070 X-RAY EXAM OF ELBOW: CPT | Mod: 26,RT

## 2019-07-10 PROCEDURE — 71045 X-RAY EXAM CHEST 1 VIEW: CPT | Mod: 26

## 2019-07-10 RX ORDER — OXYCODONE HYDROCHLORIDE 5 MG/1
5 TABLET ORAL EVERY 4 HOURS
Refills: 0 | Status: DISCONTINUED | OUTPATIENT
Start: 2019-07-10 | End: 2019-07-11

## 2019-07-10 RX ORDER — CEFAZOLIN SODIUM 1 G
2000 VIAL (EA) INJECTION EVERY 8 HOURS
Refills: 0 | Status: DISCONTINUED | OUTPATIENT
Start: 2019-07-10 | End: 2019-07-10

## 2019-07-10 RX ORDER — OXYCODONE HYDROCHLORIDE 5 MG/1
10 TABLET ORAL EVERY 4 HOURS
Refills: 0 | Status: DISCONTINUED | OUTPATIENT
Start: 2019-07-10 | End: 2019-07-11

## 2019-07-10 RX ORDER — IPRATROPIUM/ALBUTEROL SULFATE 18-103MCG
3 AEROSOL WITH ADAPTER (GRAM) INHALATION ONCE
Refills: 0 | Status: COMPLETED | OUTPATIENT
Start: 2019-07-10 | End: 2019-07-10

## 2019-07-10 RX ORDER — DOCUSATE SODIUM 100 MG
100 CAPSULE ORAL THREE TIMES A DAY
Refills: 0 | Status: DISCONTINUED | OUTPATIENT
Start: 2019-07-10 | End: 2019-07-12

## 2019-07-10 RX ORDER — CEFAZOLIN SODIUM 1 G
2000 VIAL (EA) INJECTION EVERY 8 HOURS
Refills: 0 | Status: COMPLETED | OUTPATIENT
Start: 2019-07-10 | End: 2019-07-10

## 2019-07-10 RX ORDER — ASPIRIN/CALCIUM CARB/MAGNESIUM 324 MG
325 TABLET ORAL
Refills: 0 | Status: DISCONTINUED | OUTPATIENT
Start: 2019-07-10 | End: 2019-07-12

## 2019-07-10 RX ORDER — ONDANSETRON 8 MG/1
4 TABLET, FILM COATED ORAL EVERY 6 HOURS
Refills: 0 | Status: DISCONTINUED | OUTPATIENT
Start: 2019-07-10 | End: 2019-07-12

## 2019-07-10 RX ORDER — HYDROMORPHONE HYDROCHLORIDE 2 MG/ML
0.5 INJECTION INTRAMUSCULAR; INTRAVENOUS; SUBCUTANEOUS
Refills: 0 | Status: DISCONTINUED | OUTPATIENT
Start: 2019-07-10 | End: 2019-07-11

## 2019-07-10 RX ORDER — IPRATROPIUM/ALBUTEROL SULFATE 18-103MCG
3 AEROSOL WITH ADAPTER (GRAM) INHALATION ONCE
Refills: 0 | Status: DISCONTINUED | OUTPATIENT
Start: 2019-07-10 | End: 2019-07-10

## 2019-07-10 RX ORDER — FUROSEMIDE 40 MG
20 TABLET ORAL ONCE
Refills: 0 | Status: COMPLETED | OUTPATIENT
Start: 2019-07-10 | End: 2019-07-10

## 2019-07-10 RX ORDER — HYDROMORPHONE HYDROCHLORIDE 2 MG/ML
0.5 INJECTION INTRAMUSCULAR; INTRAVENOUS; SUBCUTANEOUS EVERY 4 HOURS
Refills: 0 | Status: DISCONTINUED | OUTPATIENT
Start: 2019-07-10 | End: 2019-07-11

## 2019-07-10 RX ORDER — ACETAMINOPHEN 500 MG
650 TABLET ORAL EVERY 6 HOURS
Refills: 0 | Status: DISCONTINUED | OUTPATIENT
Start: 2019-07-10 | End: 2019-07-12

## 2019-07-10 RX ADMIN — OXYCODONE HYDROCHLORIDE 5 MILLIGRAM(S): 5 TABLET ORAL at 07:38

## 2019-07-10 RX ADMIN — Medication 3 MILLILITER(S): at 08:20

## 2019-07-10 RX ADMIN — OXYCODONE HYDROCHLORIDE 5 MILLIGRAM(S): 5 TABLET ORAL at 16:38

## 2019-07-10 RX ADMIN — OXYCODONE HYDROCHLORIDE 10 MILLIGRAM(S): 5 TABLET ORAL at 22:32

## 2019-07-10 RX ADMIN — ATORVASTATIN CALCIUM 20 MILLIGRAM(S): 80 TABLET, FILM COATED ORAL at 21:54

## 2019-07-10 RX ADMIN — Medication 50 MILLIGRAM(S): at 21:54

## 2019-07-10 RX ADMIN — BUPROPION HYDROCHLORIDE 450 MILLIGRAM(S): 150 TABLET, EXTENDED RELEASE ORAL at 12:34

## 2019-07-10 RX ADMIN — HYDROMORPHONE HYDROCHLORIDE 0.5 MILLIGRAM(S): 2 INJECTION INTRAMUSCULAR; INTRAVENOUS; SUBCUTANEOUS at 04:02

## 2019-07-10 RX ADMIN — OXYCODONE HYDROCHLORIDE 5 MILLIGRAM(S): 5 TABLET ORAL at 08:15

## 2019-07-10 RX ADMIN — Medication 100 MILLIGRAM(S): at 16:39

## 2019-07-10 RX ADMIN — OXYCODONE HYDROCHLORIDE 5 MILLIGRAM(S): 5 TABLET ORAL at 12:34

## 2019-07-10 RX ADMIN — Medication 20 MILLIGRAM(S): at 12:33

## 2019-07-10 RX ADMIN — Medication 100 MILLIGRAM(S): at 21:54

## 2019-07-10 RX ADMIN — Medication 0.5 MILLIGRAM(S): at 21:54

## 2019-07-10 RX ADMIN — Medication 2000 MILLIGRAM(S): at 14:55

## 2019-07-10 RX ADMIN — OXYCODONE HYDROCHLORIDE 5 MILLIGRAM(S): 5 TABLET ORAL at 17:15

## 2019-07-10 RX ADMIN — Medication 2000 MILLIGRAM(S): at 07:01

## 2019-07-10 RX ADMIN — OXYCODONE HYDROCHLORIDE 5 MILLIGRAM(S): 5 TABLET ORAL at 13:00

## 2019-07-10 RX ADMIN — OXYCODONE HYDROCHLORIDE 10 MILLIGRAM(S): 5 TABLET ORAL at 23:30

## 2019-07-10 RX ADMIN — HYDROMORPHONE HYDROCHLORIDE 0.5 MILLIGRAM(S): 2 INJECTION INTRAMUSCULAR; INTRAVENOUS; SUBCUTANEOUS at 04:19

## 2019-07-10 RX ADMIN — Medication 325 MILLIGRAM(S): at 06:55

## 2019-07-10 RX ADMIN — Medication 100 MILLIGRAM(S): at 06:55

## 2019-07-10 RX ADMIN — NEBIVOLOL HYDROCHLORIDE 10 MILLIGRAM(S): 5 TABLET ORAL at 06:54

## 2019-07-10 RX ADMIN — PANTOPRAZOLE SODIUM 40 MILLIGRAM(S): 20 TABLET, DELAYED RELEASE ORAL at 06:54

## 2019-07-10 RX ADMIN — AMLODIPINE BESYLATE 10 MILLIGRAM(S): 2.5 TABLET ORAL at 06:54

## 2019-07-10 RX ADMIN — Medication 325 MILLIGRAM(S): at 18:50

## 2019-07-10 NOTE — PROGRESS NOTE ADULT - ASSESSMENT
83 y/o F w/ a PMHx of breast cancer and hypertension who transferred from [facility] after having a mechanical fall and falling on her outstretched hands in her friend's house in the [facility] on 7/5/19. Pt does not have imaging done with her from [facility]. Patient pending Xray of b/l wrist and prep for hand surgery today.

## 2019-07-10 NOTE — PROGRESS NOTE ADULT - ASSESSMENT
A/P: 85yFemale s/p R elbow ORIF and L wrist ORIF  - Stable  - Pain Control  - DVT ppx:  BID  - Post op abx: Ancef 2g Q8H x 2 doses  - WBS: NWB b/l UE  - PT/OT: pending PT/OT eval  - Remainder of care as per primary team    Ortho Pager [phone] A/P: 85yFemale s/p R elbow ORIF and L wrist ORIF  - Stable -- Medicine team evaluating patient for shortness of breath   - Pain Control  - DVT ppx:  BID  - Post op abx: Ancef 2g Q8H x 2 doses  - WBS: NWB b/l UE  - PT/OT: pending PT/OT eval  - Remainder of care as per primary team    Ortho Pager [phone]

## 2019-07-10 NOTE — OCCUPATIONAL THERAPY INITIAL EVALUATION ADULT - PHYSICAL ASSIST/NONPHYSICAL ASSIST: SIT/STAND, REHAB EVAL
verbal cues/2 person assist/nonverbal cues (demo/gestures) +1 person for CGA/verbal cues/nonverbal cues (demo/gestures)/1 person assist

## 2019-07-10 NOTE — OCCUPATIONAL THERAPY INITIAL EVALUATION ADULT - PERTINENT HX OF CURRENT PROBLEM, REHAB EVAL
pt is a 84 y.o F w/ a pmh of breast cancer who transferred from [facility] after having a mechanical fall and falling on her outstretched hands in her house in the [facility] on 7/5. She denies ever losing consciousness or hitting her head.

## 2019-07-10 NOTE — OCCUPATIONAL THERAPY INITIAL EVALUATION ADULT - GENERAL OBSERVATIONS, REHAB EVAL
Pt cleared for OT by ROCIO Gomez. Pt received semi-supine in bed in NAD, +heplock, +NC 4L, +RUE/LUE sling

## 2019-07-10 NOTE — PROGRESS NOTE ADULT - PROBLEM SELECTOR PLAN 2
Pt fell at friend's home in [facility] 7/5, admitted, x rays showed b/l wrist fracture, transferred to [facility] for hand surgery per Dr. Barry and will consult Dr. Win Stewart  -X ray wrists, elbow and shoulder performed today  -Pt denies hitting head, but has bandage on right side of head, says CT was neg at [facility] for any bleed Pt fell at friend's home in [facility] 7/5, admitted, x rays showed b/l wrist fracture, transferred to [facility] for hand surgery per Dr. Barry and will consult Dr. Win Stewart. Patient with 2/6 systolic ejection murmur-consider AS as precipitant of fall.  -X ray wrists, elbow and shoulder performed today  -Pt denies hitting head, but has bandage on right side of head, says CT was neg at [facility] for any bleed  -consider echo Pt fell at friend's home in [facility] 7/5, admitted, x rays showed b/l wrist fracture, transferred to [facility] for hand surgery per Dr. Barry and will consult Dr. Win Stewart. Patient with 2/6 systolic ejection murmur-consider AS as precipitant of fall.  -X ray wrists, elbow and shoulder performed today  -Pt denies hitting head, but has bandage on right side of head, says CT was neg at [facility] for any bleed  -consider echo for evaluation of AS

## 2019-07-10 NOTE — OCCUPATIONAL THERAPY INITIAL EVALUATION ADULT - PHYSICAL ASSIST/NONPHYSICAL ASSIST: STAND/SIT, REHAB EVAL
2 person assist/verbal cues/nonverbal cues (demo/gestures) nonverbal cues (demo/gestures)/+1 person for CGA/verbal cues/1 person assist

## 2019-07-10 NOTE — BRIEF OPERATIVE NOTE - NSICDXBRIEFPREOP_GEN_ALL_CORE_FT
PRE-OP DIAGNOSIS:  Distal radius fracture, left 10-Jul-2019 03:08:59  Hubert Jordan  Closed bicondylar fracture of distal end of right humerus 10-Jul-2019 03:08:47  Hubert Jordan

## 2019-07-10 NOTE — OCCUPATIONAL THERAPY INITIAL EVALUATION ADULT - STANDING BALANCE: DYNAMIC, REHAB EVAL
Pt marched in place 10x's very fearful of taking steps, took approximately 2 steps forward and 2 steps backwards with mod x1, +1 person for CGA/poor balance

## 2019-07-10 NOTE — OCCUPATIONAL THERAPY INITIAL EVALUATION ADULT - ADDITIONAL COMMENTS
Daughter, bedside, reports mother has HHA from 8:30a-11:30pm, patient is only alone at night when she is sleeping. Was completely independent PTA, no AD required, owns a shower chair, RW, commode (however does not use them). Daughter, bedside, reports mother has caregiver from 8:30a-11:30pm, patient is only alone at night when she is sleeping. Was completely independent PTA, no AD required, owns a shower chair, RW, commode (however does not use them).

## 2019-07-10 NOTE — PROGRESS NOTE ADULT - SUBJECTIVE AND OBJECTIVE BOX
Ortho Post Op Check    Procedure: R elbow ORIF, L wrist ORIF  Surgeon: Terry    Pt comfortable without complaints, pain controlled  Denies CP, SOB, N/V, numbness/tingling     Vital Signs Last 24 Hrs  T(C): 36.7 (07-10-19 @ 05:45), Max: 36.9 (07-10-19 @ 02:55)  T(F): 98 (07-10-19 @ 05:45), Max: 98.4 (07-10-19 @ 02:55)  HR: 98 (07-10-19 @ 05:45) (90 - 100)  BP: 147/66 (07-10-19 @ 05:45) (108/50 - 155/67)  BP(mean): 78 (07-10-19 @ 05:30) (59 - 100)  RR: 16 (07-10-19 @ 05:45) (16 - 22)  SpO2: 97% (07-10-19 @ 05:45) (95% - 100%)    AVSS  General: Pt Alert and oriented, NAD  R posterior slab splint and L short arm splint both C/D/I; b/l sling in place  Pulses: Hands WWP; R Radial pulse 2+; b/l Cap refill < 2 sec  Sensation: SILT and symmetric  Motor:  AIN/PIN/U intact bilaterally                        8.8    9.01  )-----------( 239      ( 08 Jul 2019 20:41 )             27.7     08 Jul 2019 20:41    133    |  98     |  17     ----------------------------<  138    3.9     |  25     |  0.84     Mg     2.1       08 Jul 2019 20:41 Ortho Post Op Check    Procedure: R elbow ORIF, L wrist ORIF  Surgeon: Terry    Pt comfortable without complaints of pain  Denies CP, N/V, numbness/tingling   Patient with some subjective shortness of breath; Not distressed; RR WNL  O2 sat on NC 96% at time of exam  Primary team resident made aware    Vital Signs Last 24 Hrs  T(C): 36.7 (07-10-19 @ 05:45), Max: 36.9 (07-10-19 @ 02:55)  T(F): 98 (07-10-19 @ 05:45), Max: 98.4 (07-10-19 @ 02:55)  HR: 98 (07-10-19 @ 05:45) (90 - 100)  BP: 147/66 (07-10-19 @ 05:45) (108/50 - 155/67)  BP(mean): 78 (07-10-19 @ 05:30) (59 - 100)  RR: 16 (07-10-19 @ 05:45) (16 - 22)  SpO2: 97% (07-10-19 @ 05:45) (95% - 100%)    AVSS  General: Pt Alert and oriented, NAD  R posterior slab splint and L short arm splint both C/D/I; b/l sling in place  Pulses: Hands WWP; R Radial pulse 2+; b/l Cap refill < 2 sec  Sensation: SILT and symmetric  Motor:  AIN/PIN/U intact bilaterally                        8.8    9.01  )-----------( 239      ( 08 Jul 2019 20:41 )             27.7     08 Jul 2019 20:41    133    |  98     |  17     ----------------------------<  138    3.9     |  25     |  0.84     Mg     2.1       08 Jul 2019 20:41

## 2019-07-10 NOTE — BRIEF OPERATIVE NOTE - NSICDXBRIEFPROCEDURE_GEN_ALL_CORE_FT
PROCEDURES:  Repair of distal radius 10-Jul-2019 03:08:30  Hubert Jordan  Open reduction of injury of right distal humerus 10-Jul-2019 03:08:20  Hubetr Jordan

## 2019-07-10 NOTE — OCCUPATIONAL THERAPY INITIAL EVALUATION ADULT - MD ORDER
Repair of distal radius 10-Jul-2019 03:08:30    Open reduction of injury of right distal humerus 10-Jul-2019

## 2019-07-10 NOTE — PROGRESS NOTE ADULT - SUBJECTIVE AND OBJECTIVE BOX
OVERNIGHT EVENTS: Patient for orthopaedic surgery overnight, returned to floor at 6am.    SUBJECTIVE / INTERVAL HPI: Patient seen and examined at bedside. Endorsed pain of left shoulder and clavicular area, patient was also complaining of SOB. Denies chest pain, palpitations, numbness of extremities.    Review of systems negative except as noted above.     VITAL SIGNS:  Vital Signs Last 24 Hrs  T(C): 36.7 (10 Jul 2019 05:45), Max: 37.1 (2019 12:21)  T(F): 98 (10 Jul 2019 05:45), Max: 98.8 (2019 12:21)  HR: 98 (10 Jul 2019 05:45) (82 - 100)  BP: 147/66 (10 Jul 2019 05:45) (108/50 - 169/63)  BP(mean): 78 (10 Jul 2019 05:30) (59 - 100)  RR: 16 (10 Jul 2019 05:45) (16 - 22)  SpO2: 97% (10 Jul 2019 05:45) (91% - 100%)      19 @ 07:01  -  07-10-19 @ 07:00  --------------------------------------------------------  IN: 0 mL / OUT: 100 mL / NET: -100 mL    PHYSICAL EXAM:    General: Patient resting comfortably in bed with slings on both arms, on 2L NC at time of exam, no accessory muscle use, no nasal flaring  HEENT: Bandage along R eyebrow, Dry mucous membranes  Neck: supple, no JVD  Cardiovascular: +S1/S2; RRR 2/6 systolic ejection murmur heard at ADELE and LEON boarder  Respiratory: Trace crackles and expiratory wheezing along axilla bilaterally  Gastrointestinal: soft, NT/ND; +BS  Extremities: WWP; able to move digits on both hands  Vascular: 2+ radial, DP pulses B/L  Neurological: No gross FND    MEDICATIONS:  MEDICATIONS  (STANDING):  ALBUTerol/ipratropium for Nebulization. 3 milliLiter(s) Nebulizer once  ALPRAZolam 0.5 milliGRAM(s) Oral at bedtime  amLODIPine   Tablet 10 milliGRAM(s) Oral daily  aspirin enteric coated 325 milliGRAM(s) Oral two times a day  atorvastatin 20 milliGRAM(s) Oral at bedtime  buPROPion XL . 450 milliGRAM(s) Oral daily  ceFAZolin  Injectable. 2000 milliGRAM(s) IV Push every 8 hours  docusate sodium 100 milliGRAM(s) Oral three times a day  nebivolol 10 milliGRAM(s) Oral daily  pantoprazole    Tablet 40 milliGRAM(s) Oral before breakfast  traZODone 50 milliGRAM(s) Oral at bedtime    MEDICATIONS  (PRN):  acetaminophen   Tablet .. 650 milliGRAM(s) Oral every 6 hours PRN Temp greater or equal to 38C (100.4F), Mild Pain (1 - 3)  acetaminophen   Tablet .. 650 milliGRAM(s) Oral every 6 hours PRN Moderate Pain (4 - 6)  guaiFENesin    Syrup 200 milliGRAM(s) Oral every 6 hours PRN phlegm  HYDROmorphone  Injectable 0.5 milliGRAM(s) IV Push every 10 minutes PRN pacu pain  HYDROmorphone  Injectable 0.5 milliGRAM(s) IV Push every 4 hours PRN breakthrough pain  ondansetron Injectable 4 milliGRAM(s) IV Push every 6 hours PRN Nausea and/or Vomiting  oxyCODONE    IR 10 milliGRAM(s) Oral every 4 hours PRN Severe Pain (7 - 10)  oxyCODONE    IR 5 milliGRAM(s) Oral every 4 hours PRN Moderate Pain (4 - 6)      ALLERGIES:  Allergies    No Known Allergies    Intolerances        LABS:                        8.8    9.01  )-----------( 239      ( 2019 20:41 )             27.7     07-08    133<L>  |  98  |  17  ----------------------------<  138<H>  3.9   |  25  |  0.84    Ca    8.2<L>      2019 20:41  Mg     2.1     07-08      PT/INR - ( 2019 20:41 )   PT: 12.2 sec;   INR: 1.08          PTT - ( 2019 20:41 )  PTT:34.7 sec  Urinalysis Basic - ( 2019 12:09 )    Color: Yellow / Appearance: Clear / S.015 / pH: x  Gluc: x / Ketone: NEGATIVE  / Bili: Negative / Urobili: 0.2 E.U./dL   Blood: x / Protein: Trace mg/dL / Nitrite: NEGATIVE   Leuk Esterase: NEGATIVE / RBC: < 5 /HPF / WBC < 5 /HPF   Sq Epi: x / Non Sq Epi: 5-10 /HPF / Bacteria: Present /HPF      CAPILLARY BLOOD GLUCOSE              RADIOLOGY & ADDITIONAL TESTS: Reviewed. OVERNIGHT EVENTS: Patient for orthopaedic surgery overnight, returned to floor at 6am.    SUBJECTIVE / INTERVAL HPI: Patient seen and examined at bedside. Endorsed pain of left shoulder and clavicular area, patient was also complaining of SOB. Denies chest pain, palpitations, numbness of extremities.    Review of systems negative except as noted above.     VITAL SIGNS:  Vital Signs Last 24 Hrs  T(C): 36.7 (10 Jul 2019 05:45), Max: 37.1 (2019 12:21)  T(F): 98 (10 Jul 2019 05:45), Max: 98.8 (2019 12:21)  HR: 98 (10 Jul 2019 05:45) (82 - 100)  BP: 147/66 (10 Jul 2019 05:45) (108/50 - 169/63)  BP(mean): 78 (10 Jul 2019 05:30) (59 - 100)  RR: 16 (10 Jul 2019 05:45) (16 - 22)  SpO2: 97% (10 Jul 2019 05:45) (91% - 100%)      19 @ 07:01  -  07-10-19 @ 07:00  --------------------------------------------------------  IN: 0 mL / OUT: 100 mL / NET: -100 mL    PHYSICAL EXAM:    General: Patient resting comfortably in bed with slings on both arms, on 2L NC at time of exam, no accessory muscle use, no nasal flaring  HEENT: Bandage along R eyebrow, Dry mucous membranes, no sign of oral erythema  Neck: supple, no JVD  Cardiovascular: +S1/S2; RRR 2/6 systolic ejection murmur heard at ADELE and LEON boarder  Respiratory: Trace crackles and expiratory wheezing along axilla bilaterally  Gastrointestinal: soft, NT/ND; +BS  Extremities: WWP; able to move digits on both hands  Vascular: 2+ radial, DP pulses B/L  Neurological: No gross FND    MEDICATIONS:  MEDICATIONS  (STANDING):  ALBUTerol/ipratropium for Nebulization. 3 milliLiter(s) Nebulizer once  ALPRAZolam 0.5 milliGRAM(s) Oral at bedtime  amLODIPine   Tablet 10 milliGRAM(s) Oral daily  aspirin enteric coated 325 milliGRAM(s) Oral two times a day  atorvastatin 20 milliGRAM(s) Oral at bedtime  buPROPion XL . 450 milliGRAM(s) Oral daily  ceFAZolin  Injectable. 2000 milliGRAM(s) IV Push every 8 hours  docusate sodium 100 milliGRAM(s) Oral three times a day  nebivolol 10 milliGRAM(s) Oral daily  pantoprazole    Tablet 40 milliGRAM(s) Oral before breakfast  traZODone 50 milliGRAM(s) Oral at bedtime    MEDICATIONS  (PRN):  acetaminophen   Tablet .. 650 milliGRAM(s) Oral every 6 hours PRN Temp greater or equal to 38C (100.4F), Mild Pain (1 - 3)  acetaminophen   Tablet .. 650 milliGRAM(s) Oral every 6 hours PRN Moderate Pain (4 - 6)  guaiFENesin    Syrup 200 milliGRAM(s) Oral every 6 hours PRN phlegm  HYDROmorphone  Injectable 0.5 milliGRAM(s) IV Push every 10 minutes PRN pacu pain  HYDROmorphone  Injectable 0.5 milliGRAM(s) IV Push every 4 hours PRN breakthrough pain  ondansetron Injectable 4 milliGRAM(s) IV Push every 6 hours PRN Nausea and/or Vomiting  oxyCODONE    IR 10 milliGRAM(s) Oral every 4 hours PRN Severe Pain (7 - 10)  oxyCODONE    IR 5 milliGRAM(s) Oral every 4 hours PRN Moderate Pain (4 - 6)      ALLERGIES:  Allergies    No Known Allergies    Intolerances        LABS:                        8.8    9.01  )-----------( 239      ( 2019 20:41 )             27.7     07-08    133<L>  |  98  |  17  ----------------------------<  138<H>  3.9   |  25  |  0.84    Ca    8.2<L>      2019 20:41  Mg     2.1     07-08      PT/INR - ( 2019 20:41 )   PT: 12.2 sec;   INR: 1.08          PTT - ( 2019 20:41 )  PTT:34.7 sec  Urinalysis Basic - ( 2019 12:09 )    Color: Yellow / Appearance: Clear / S.015 / pH: x  Gluc: x / Ketone: NEGATIVE  / Bili: Negative / Urobili: 0.2 E.U./dL   Blood: x / Protein: Trace mg/dL / Nitrite: NEGATIVE   Leuk Esterase: NEGATIVE / RBC: < 5 /HPF / WBC < 5 /HPF   Sq Epi: x / Non Sq Epi: 5-10 /HPF / Bacteria: Present /HPF      CAPILLARY BLOOD GLUCOSE              RADIOLOGY & ADDITIONAL TESTS: Reviewed. OVERNIGHT EVENTS: Patient for orthopaedic surgery overnight, returned to floor at 6am.    SUBJECTIVE / INTERVAL HPI: Patient seen and examined at bedside. Endorsed pain of left shoulder and clavicular area, patient was also complaining of SOB. Denies chest pain, palpitations, numbness of extremities.    Review of systems negative except as noted above.     VITAL SIGNS:  Vital Signs Last 24 Hrs  T(C): 36.7 (10 Jul 2019 05:45), Max: 37.1 (2019 12:21)  T(F): 98 (10 Jul 2019 05:45), Max: 98.8 (2019 12:21)  HR: 98 (10 Jul 2019 05:45) (82 - 100)  BP: 147/66 (10 Jul 2019 05:45) (108/50 - 169/63)  BP(mean): 78 (10 Jul 2019 05:30) (59 - 100)  RR: 16 (10 Jul 2019 05:45) (16 - 22)  SpO2: 97% (10 Jul 2019 05:45) (91% - 100%)      19 @ 07:01  -  07-10-19 @ 07:00  --------------------------------------------------------  IN: 0 mL / OUT: 100 mL / NET: -100 mL    PHYSICAL EXAM:    General: Patient resting comfortably in bed with slings on both arms, on 2L NC at time of exam, no accessory muscle use, no nasal flaring  HEENT: Bandage along R eyebrow, Dry mucous membranes, no sign of oral erythema  Neck: supple, no JVD  Cardiovascular: +S1/S2; RRR 2/6 systolic ejection murmur heard at ADELE and LEON boarder  Respiratory: Trace crackles and expiratory wheezing along axilla bilaterally  Gastrointestinal: soft, NT/ND; +BS  Extremities: WWP; able to move digits on both hands  Vascular: 2+ radial, DP pulses B/L  Neurological: No gross FND    MEDICATIONS:  MEDICATIONS  (STANDING):  ALBUTerol/ipratropium for Nebulization. 3 milliLiter(s) Nebulizer once  ALPRAZolam 0.5 milliGRAM(s) Oral at bedtime  amLODIPine   Tablet 10 milliGRAM(s) Oral daily  aspirin enteric coated 325 milliGRAM(s) Oral two times a day  atorvastatin 20 milliGRAM(s) Oral at bedtime  buPROPion XL . 450 milliGRAM(s) Oral daily  ceFAZolin  Injectable. 2000 milliGRAM(s) IV Push every 8 hours  docusate sodium 100 milliGRAM(s) Oral three times a day  nebivolol 10 milliGRAM(s) Oral daily  pantoprazole    Tablet 40 milliGRAM(s) Oral before breakfast  traZODone 50 milliGRAM(s) Oral at bedtime    MEDICATIONS  (PRN):  acetaminophen   Tablet .. 650 milliGRAM(s) Oral every 6 hours PRN Temp greater or equal to 38C (100.4F), Mild Pain (1 - 3)  acetaminophen   Tablet .. 650 milliGRAM(s) Oral every 6 hours PRN Moderate Pain (4 - 6)  guaiFENesin    Syrup 200 milliGRAM(s) Oral every 6 hours PRN phlegm  HYDROmorphone  Injectable 0.5 milliGRAM(s) IV Push every 10 minutes PRN pacu pain  HYDROmorphone  Injectable 0.5 milliGRAM(s) IV Push every 4 hours PRN breakthrough pain  ondansetron Injectable 4 milliGRAM(s) IV Push every 6 hours PRN Nausea and/or Vomiting  oxyCODONE    IR 10 milliGRAM(s) Oral every 4 hours PRN Severe Pain (7 - 10)  oxyCODONE    IR 5 milliGRAM(s) Oral every 4 hours PRN Moderate Pain (4 - 6)      ALLERGIES:  Allergies    No Known Allergies    Intolerances        LABS:                        8.8    9.01  )-----------( 239      ( 2019 20:41 )             27.7     07-08    133<L>  |  98  |  17  ----------------------------<  138<H>  3.9   |  25  |  0.84    Ca    8.2<L>      2019 20:41  Mg     2.1     07-08      PT/INR - ( 2019 20:41 )   PT: 12.2 sec;   INR: 1.08          PTT - ( 2019 20:41 )  PTT:34.7 sec  Urinalysis Basic - ( 2019 12:09 )    Color: Yellow / Appearance: Clear / S.015 / pH: x  Gluc: x / Ketone: NEGATIVE  / Bili: Negative / Urobili: 0.2 E.U./dL   Blood: x / Protein: Trace mg/dL / Nitrite: NEGATIVE   Leuk Esterase: NEGATIVE / RBC: < 5 /HPF / WBC < 5 /HPF   Sq Epi: x / Non Sq Epi: 5-10 /HPF / Bacteria: Present /HPF      CAPILLARY BLOOD GLUCOSE    < from: CT Elbow No Cont, Right (19 @ 16:42) >    Impression: Comminuted intra-articular fracture of the distal radius.     < end of copied text >< from: Xray Shoulder 2 Views, Bilateral (19 @ 11:32) >  Frontal and lateral views of the left elbow demonstrates overlying   contrast. Severe degenerative change of the ulnotrochlear joint with   proliferative osteophytosis and multiple intra-articular loose bodies.   Erosion versus osteotomy of the radial head, suggestive of inflammatory   arthropathy. Small joint effusion. No definite elbow fracture.    Frontal and lateral views of the right elbow demonstrates a comminuted   fracture of the distal humerus extending from the lateral to the medial   epicondyle. There is subluxation of the radial head with the distal   lateral epicondylar fracture fragment. Overlying cast. Osteopenia.    Frontal and lateral views of the right wrist demonstrates fracture   deformity of the distal radius with impaction, possibly subacute.   Overlying cast. Osteopenia. Degenerative change of the distal radioulnar   joint with erosive change of the distal ulna, suggestive of inflammatory   arthropathy. Appropriate carpal alignment.    Frontal and lateral views of the left wrist demonstrates a comminuted   distal radial fracture with extension into the radiocarpal joint with   mild distraction of fracture fragments and apex dorsal angulation.   Osteopenia.     < end of copied text >

## 2019-07-10 NOTE — PROGRESS NOTE ADULT - PROBLEM SELECTOR PLAN 6
VTE: Hold in setting of preop but restart after surgery    FULL CODE    Dispo: Admit to [facility] for multiple fractures pending ortho procedure, pending PT evaluation VTE: Continue post op    FULL CODE    Dispo: Admit to [facility] for multiple fractures pending ortho procedure, pending PT evaluation

## 2019-07-10 NOTE — PROGRESS NOTE ADULT - PROBLEM SELECTOR PLAN 1
- Ortho is following  -  Xray of b/l wrist, elbow and shoulder performed today  - per ortho, ordered for CT noncontrast of R elbow and L wrist  - scheduled for surgery 9pm [facility]  - Surgery is currently pending - Ortho following  - Surgery performed: Distal radius fracture repair, Open reduction of injury of right distal humerus    #SOB s/p procedure upon return to the floor, expiratory wheezing and trace crackles noted  -Given Duo/nebs, patient improved  -Holding Fluids for now  -CXR pending - Ortho following  - Surgery performed: Distal radius fracture repair, Open reduction of injury of right distal humerus Early in AM of 7/10    #SOB s/p procedure upon return to the floor, expiratory wheezing and trace crackles noted  -Given Duo/nebs, patient improved  -Holding Fluids for now  -CXR pending

## 2019-07-11 ENCOUNTER — TRANSCRIPTION ENCOUNTER (OUTPATIENT)
Age: 84
End: 2019-07-11

## 2019-07-11 ENCOUNTER — APPOINTMENT (OUTPATIENT)
Dept: ORTHOPEDIC SURGERY | Facility: CLINIC | Age: 84
End: 2019-07-11

## 2019-07-11 RX ORDER — OXYCODONE HYDROCHLORIDE 5 MG/1
5 TABLET ORAL EVERY 6 HOURS
Refills: 0 | Status: DISCONTINUED | OUTPATIENT
Start: 2019-07-11 | End: 2019-07-12

## 2019-07-11 RX ADMIN — BUPROPION HYDROCHLORIDE 450 MILLIGRAM(S): 150 TABLET, EXTENDED RELEASE ORAL at 12:37

## 2019-07-11 RX ADMIN — Medication 0.5 MILLIGRAM(S): at 22:05

## 2019-07-11 RX ADMIN — Medication 325 MILLIGRAM(S): at 18:43

## 2019-07-11 RX ADMIN — OXYCODONE HYDROCHLORIDE 5 MILLIGRAM(S): 5 TABLET ORAL at 22:35

## 2019-07-11 RX ADMIN — AMLODIPINE BESYLATE 10 MILLIGRAM(S): 2.5 TABLET ORAL at 06:20

## 2019-07-11 RX ADMIN — Medication 325 MILLIGRAM(S): at 06:20

## 2019-07-11 RX ADMIN — NEBIVOLOL HYDROCHLORIDE 10 MILLIGRAM(S): 5 TABLET ORAL at 06:19

## 2019-07-11 RX ADMIN — OXYCODONE HYDROCHLORIDE 5 MILLIGRAM(S): 5 TABLET ORAL at 22:05

## 2019-07-11 RX ADMIN — ATORVASTATIN CALCIUM 20 MILLIGRAM(S): 80 TABLET, FILM COATED ORAL at 22:04

## 2019-07-11 RX ADMIN — OXYCODONE HYDROCHLORIDE 5 MILLIGRAM(S): 5 TABLET ORAL at 12:37

## 2019-07-11 RX ADMIN — PANTOPRAZOLE SODIUM 40 MILLIGRAM(S): 20 TABLET, DELAYED RELEASE ORAL at 06:19

## 2019-07-11 RX ADMIN — Medication 100 MILLIGRAM(S): at 06:19

## 2019-07-11 RX ADMIN — OXYCODONE HYDROCHLORIDE 5 MILLIGRAM(S): 5 TABLET ORAL at 14:00

## 2019-07-11 RX ADMIN — Medication 100 MILLIGRAM(S): at 22:05

## 2019-07-11 RX ADMIN — Medication 50 MILLIGRAM(S): at 22:05

## 2019-07-11 NOTE — CONSULT NOTE ADULT - SUBJECTIVE AND OBJECTIVE BOX
Patient is a 85y old  Female who presents with a chief complaint of fractured wrists s/p fall (2019 07:57)       HPI:  pt is a 84 y.o F w/ a pmh of breast cancer who transferred from [facility] after having a mechanical fall and falling on her outstretched hands in her house in the [facility] on . She denies ever losing consciousness or hitting her head. She was admitted to [facility] where she says they did a CT of her head which came back negative. She does have a bandage on the right side of her head and her aid said she got stitches on the day of her fall. Ortho was consulted at [facility], but she wanted to come to [facility] for the surgery. She got transferred from [facility] on  to [facility]. (2019 17:33)      PAST MEDICAL & SURGICAL HISTORY:  Breast cancer  History of lymph node excision: in axilla, around left breast 40 years ago  Bilateral bunions      MEDICATIONS  (STANDING):  ALPRAZolam 0.5 milliGRAM(s) Oral at bedtime  amLODIPine   Tablet 10 milliGRAM(s) Oral daily  aspirin enteric coated 325 milliGRAM(s) Oral two times a day  atorvastatin 20 milliGRAM(s) Oral at bedtime  buPROPion XL . 450 milliGRAM(s) Oral daily  docusate sodium 100 milliGRAM(s) Oral three times a day  nebivolol 10 milliGRAM(s) Oral daily  pantoprazole    Tablet 40 milliGRAM(s) Oral before breakfast  traZODone 50 milliGRAM(s) Oral at bedtime    MEDICATIONS  (PRN):  acetaminophen   Tablet .. 650 milliGRAM(s) Oral every 6 hours PRN Temp greater or equal to 38C (100.4F), Mild Pain (1 - 3)  acetaminophen   Tablet .. 650 milliGRAM(s) Oral every 6 hours PRN Moderate Pain (4 - 6)  guaiFENesin    Syrup 200 milliGRAM(s) Oral every 6 hours PRN phlegm  ondansetron Injectable 4 milliGRAM(s) IV Push every 6 hours PRN Nausea and/or Vomiting  oxyCODONE    IR 10 milliGRAM(s) Oral every 4 hours PRN Severe Pain (7 - 10)  oxyCODONE    IR 5 milliGRAM(s) Oral every 4 hours PRN Moderate Pain (4 - 6)      Social History per patient:  denied ETOH/ IVDA/ Tobacco use           - Home Living Status:  , has 2 daughters, lives alone in an elevator accessible apartment building           - Home Care Services: has private hire aide 24 h/ 7 days    Functional Level Prior to Admission per patient: ADL independent, walked without assistive devices    FAMILY HISTORY:  Known health problems: none    Urinalysis Basic - ( 2019 12:09 )    Color: Yellow / Appearance: Clear / S.015 / pH: x  Gluc: x / Ketone: NEGATIVE  / Bili: Negative / Urobili: 0.2 E.U./dL   Blood: x / Protein: Trace mg/dL / Nitrite: NEGATIVE   Leuk Esterase: NEGATIVE / RBC: < 5 /HPF / WBC < 5 /HPF   Sq Epi: x / Non Sq Epi: 5-10 /HPF / Bacteria: Present /HPF          Radiology:    < from: Xray Wrist 3 Views, Left (07.10.19 @ 10:04) >  EXAM:  XR WRIST-LEFT MIN 3 VIEWS                          EXAM:  XR ELBOW-RIGHT-2 VIEWS                          PROCEDURE DATE:  07/10/2019          INTERPRETATION:  CLINICAL INDICATION: 85-year-old postoperative study;   wrist fracture.      IMPRESSION: Limited frontal and lateral views of the right elbow are   compared to 2019 and demonstrates interval placement of fixation   hardware traversing the distal humerus and proximal olecranon with   improved anatomic alignment. Overlying skin staples and expected   postprocedural changes.    Frontal, lateral and oblique views of the left wrist compared to 2019   and demonstrates interval fixation of a comminuted distal radial fracture   with improved anatomic alignment. Limited by overlying gas.          < from: Xray Wrist 3 Views, Left (07.10.19 @ 10:04) >  EXAM:  XR WRIST-LEFT MIN 3 VIEWS                          EXAM:  XR ELBOW-RIGHT-2 VIEWS                          PROCEDURE DATE:  07/10/2019          INTERPRETATION:  CLINICAL INDICATION: 85-year-old postoperative study;   wrist fracture.      IMPRESSION: Limited frontal and lateral views of the right elbow are   compared to 2019 and demonstrates interval placement of fixation   hardware traversing the distal humerus and proximal olecranon with   improved anatomic alignment. Overlying skin staples and expected   postprocedural changes.    Frontal, lateral and oblique views of the left wrist compared to 2019   and demonstrates interval fixation of a comminuted distal radial fracture   with improved anatomic alignment. Limited by overlying gas.              Vital Signs Last 24 Hrs  T(C): 37 (2019 06:06), Max: 37.2 (10 Jul 2019 12:31)  T(F): 98.6 (2019 06:06), Max: 98.9 (10 Jul 2019 12:31)  HR: 77 (2019 06:06) (71 - 98)  BP: 151/52 (2019 06:06) (111/60 - 151/52)  BP(mean): --  RR: 16 (2019 06:06) (16 - 16)  SpO2: 94% (2019 06:06) (94% - 98%)    REVIEW OF SYSTEMS:    CONSTITUTIONAL: fatigue  EYES: No eye pain, visual disturbances, or discharge  ENMT:  No difficulty hearing, tinnitus, vertigo; No sinus or throat pain  NECK: No pain or stiffness  BREASTS: No pain, masses, or nipple discharge  RESPIRATORY: No cough, wheezing, chills or hemoptysis; No shortness of breath  CARDIOVASCULAR: No chest pain, palpitations, dizziness, or leg swelling  GASTROINTESTINAL: No abdominal or epigastric pain. No nausea, vomiting, or hematemesis; No diarrhea or constipation. No melena or hematochezia.  GENITOURINARY: No dysuria, frequency, hematuria, or incontinence  NEUROLOGICAL: No headaches, memory loss, loss of strength, numbness, or tremors  SKIN: No itching, burning, rashes, or lesions   LYMPH NODES: No enlarged glands  ENDOCRINE: No heat or cold intolerance; No hair loss  MUSCULOSKELETAL: No joint pain or swelling; No muscle, back, or extremity pain  PSYCHIATRIC: No depression, anxiety, mood swings, or difficulty sleeping  HEME/LYMPH: No easy bruising, or bleeding gums  ALLERGY AND IMMUNOLOGIC: No hives or eczema  VASCULAR: no swelling, erythema      Physical Exam: pleasant 84 yo  woman lying in semi Lai's position , c/o feeling tired, no c/o pain, bilateral UE slings in place    Head: normocephalic, right brow dressing C/D/I    Eyes: PERRLA, EOMI, no nystagmus, sclera anicteric    ENT: nasal discharge, uvula midline, no oropharyngeal erythema/exudate    Neck: supple, negative JVD, negative carotid bruits, no thyromegaly    Chest: CTA bilaterally, neg wheeze, rhonchi, rales, crackles, egophany    Cardiovascular: regular rate and rhythm, II/VI systolic murmur    Abdomen: soft, non distended, non tender, negative rebound/guarding, normal bowel sounds, neg hepatosplenomegaly    Extremities: Right posterior slab splint/ Left short arm splint in place    :     Neurologic Exam:    Alert and oriented to person, place, date/year, speech fluent w/o dysarthria, recent and remote memory intact, repetition intact, comprehension intact    Cranial Nerves:     II:                       pupils equal, round and reactive to light, visual fields intact   III/ IV/VI:             extraocular movements intact, neg nystagmus, neg ptosis  V:                       facial sensation intact, V1-3 normal  VII:                     face symmetric, no droop, normal eye closure and smile  VIII:                    hearing intact to finger rub bilaterally  IX/ X:                 soft palate rise symmetrical  XI:                      head turning, shoulder shrug normal  XII:                     tongue midline    Motor Exam:    Upper Extremities:     RIght:     4/5   /intrinsics               wrist flexors/extensors not tested               biceps/triceps not tested               3+/5  deltoid                Left :      4/5   /intrinsics               wrist flexors/extensors not tested               biceps/triceps not tested               3+/5  deltoid    Lower Extremities:                 Right:      5/5  DF/PF/ evertors/ invertors                5/5  Quad/ hamstrings                3+ to 4-/5  hip flexors/adductors/abductors                 Left:       5/5  DF/PF/ evertors/ invertors                5/5  Quad/ hamstrings                3+ to 4-/5  hip flexors/adductors/abductors                 Gait:  not tested        PM&R Impression:    1) s/p mechanical fall  2) s/p R elbow ORIF and L wrist ORIF on 7/10  3) non weight bearing bilateral UE        Recommendations:    1) Physical therapy focusing on therapeutic exercises, bed mobility/transfer out of bed evaluation, progressive ambulation with assistive devices prn.    2) Anticipated Disposition Plan/Recs: subacute rehab placement, requesting [place] rehab

## 2019-07-11 NOTE — DIETITIAN INITIAL EVALUATION ADULT. - PROBLEM SELECTOR PLAN 2
pt fell at home in [facility] ER 7/5, admitted, x rays showed b/l wrist fracture, transferred to [facility] for hand surgery per Dr. Barry will consult Dr. Win Stewart  -X ray wrists ordered  -Pt denies hitting head, but has bandage on right side of head, says CT was neg at [facility] for any bleed

## 2019-07-11 NOTE — PHYSICAL THERAPY INITIAL EVALUATION ADULT - CRITERIA FOR SKILLED THERAPEUTIC INTERVENTIONS
risk reduction/prevention/rehab potential/therapy frequency/anticipated discharge recommendation/functional limitations in following categories/impairments found

## 2019-07-11 NOTE — DIETITIAN INITIAL EVALUATION ADULT. - PROBLEM SELECTOR PLAN 1
Ortho following...Imaging done at [facility] 7/5:    Xray: Left forearm and wrist- comminuted intra-articular fracture of the distal radius w/ left tissue swelling    Xray: Right elbow- moderate displaced fracture of distal humerus w/ associated effusion, hemarthrosis right elbow, fracture extends to both medial and lateral epicondyles    CT abd/pelvis: fracure left medial clavicle, manubrium    CT Cervical spine: degenerative changes, C7-T1 anterolithiasis, compression deformity T4    CT brain w/o contrast: No hemorrhage, only involutional and microvascular changes

## 2019-07-11 NOTE — PROGRESS NOTE ADULT - SUBJECTIVE AND OBJECTIVE BOX
Ortho Progress Note    Pt comfortable without complaints of pain  Denies CP, SOB, N/V, numbness/tingling       Vital Signs Last 24 Hrs  T(C): 37 (11 Jul 2019 06:06), Max: 37.2 (10 Jul 2019 12:31)  T(F): 98.6 (11 Jul 2019 06:06), Max: 98.9 (10 Jul 2019 12:31)  HR: 77 (11 Jul 2019 06:06) (71 - 98)  BP: 151/52 (11 Jul 2019 06:06) (111/60 - 151/52)  BP(mean): --  RR: 16 (11 Jul 2019 06:06) (16 - 16)  SpO2: 94% (11 Jul 2019 06:06) (94% - 98%)    AVSS  General: Pt Alert and oriented, NAD  R posterior slab splint and L short arm splint both C/D/I; b/l sling in place  Pulses: Hands WWP; R Radial pulse 2+; b/l Cap refill < 2 sec  Sensation: SILT and symmetric  Motor:  AIN/PIN/U intact bilaterally

## 2019-07-11 NOTE — PROGRESS NOTE ADULT - PROBLEM SELECTOR PLAN 2
Pt fell at friend's home in [facility] 7/5, admitted, x rays showed b/l wrist fracture, transferred to [facility] for hand surgery per Dr. Barry and will consult Dr. Win Stewart. Patient with 2/6 systolic ejection murmur-consider AS as precipitant of fall.  -X ray wrists, elbow and shoulder performed today  -Pt denies hitting head, but has bandage on right side of head, says CT was neg at [facility] for any bleed  -consider echo for evaluation of AS

## 2019-07-11 NOTE — PROGRESS NOTE ADULT - ASSESSMENT
A/P: 85yFemale s/p R elbow ORIF and L wrist ORIF on 7/10  - Stable   - Pain Control  - DVT ppx:  BID  - Post op abx: Ancef 2g Q8H x 2 doses  - WBS: NWB b/l UE  - OT  - dispo: per primary  Ortho Pager [phone]

## 2019-07-11 NOTE — PROGRESS NOTE ADULT - SUBJECTIVE AND OBJECTIVE BOX
Feeling well   pain controlled NO BM   Little appetite  Breathing comfortably on room air    PAST MEDICAL & SURGICAL HISTORY:  Breast cancer  History of lymph node excision: in axilla, around left breast 40 years ago  Bilateral bunions    MEDICATIONS  (STANDING):  ALPRAZolam 0.5 milliGRAM(s) Oral at bedtime  amLODIPine   Tablet 10 milliGRAM(s) Oral daily  aspirin enteric coated 325 milliGRAM(s) Oral two times a day  atorvastatin 20 milliGRAM(s) Oral at bedtime  buPROPion XL . 450 milliGRAM(s) Oral daily  docusate sodium 100 milliGRAM(s) Oral three times a day  nebivolol 10 milliGRAM(s) Oral daily  pantoprazole    Tablet 40 milliGRAM(s) Oral before breakfast  traZODone 50 milliGRAM(s) Oral at bedtime    MEDICATIONS  (PRN):  acetaminophen   Tablet .. 650 milliGRAM(s) Oral every 6 hours PRN Temp greater or equal to 38C (100.4F), Mild Pain (1 - 3)  acetaminophen   Tablet .. 650 milliGRAM(s) Oral every 6 hours PRN Moderate Pain (4 - 6)  guaiFENesin    Syrup 200 milliGRAM(s) Oral every 6 hours PRN phlegm  ondansetron Injectable 4 milliGRAM(s) IV Push every 6 hours PRN Nausea and/or Vomiting  oxyCODONE    IR 5 milliGRAM(s) Oral every 6 hours PRN Severe Pain (7 - 10)    Vital Signs Last 24 Hrs  T(C): 36.9 (11 Jul 2019 12:29), Max: 37.1 (10 Jul 2019 21:35)  T(F): 98.4 (11 Jul 2019 12:29), Max: 98.7 (10 Jul 2019 21:35)  HR: 65 (11 Jul 2019 12:29) (65 - 78)  BP: 133/83 (11 Jul 2019 12:29) (111/60 - 151/52)  BP(mean): --  RR: 16 (11 Jul 2019 12:29) (16 - 16)  SpO2: 91% (11 Jul 2019 12:29) (91% - 97%)    NAD  No JVD  Chest clear  CV RRR s1s2 II/VI GOMEZ LSB  Abd soft  Ext no edema

## 2019-07-11 NOTE — DIETITIAN INITIAL EVALUATION ADULT. - ENERGY NEEDS
Ideal body weight used for calculations as pt >120% of IBW.   ABW 69.5kg, IBW 54kg, 127% IBW, ht 64", BMI 26.3   Nutrient needs based on [facility] standards of care for maintenance in adults, adjusted for b/l fracture s/p ORIF

## 2019-07-11 NOTE — PROGRESS NOTE ADULT - PROBLEM SELECTOR PLAN 6
VTE: Continue post op    FULL CODE    Dispo: Admit to [facility] for multiple fractures pending ortho procedure, pending PT evaluation

## 2019-07-11 NOTE — DISCHARGE NOTE PROVIDER - HOSPITAL COURSE
85 y/o F w/ a PMHx of breast cancer who transferred from [facility] after having a mechanical fall and falling on her outstretched hands. Patient admitted to medicine floor and evaluated by orthopedic service. Imaging upon admission showed: Left clavicular fracture, left forearm and wrist fracture, R elbow fracture and manubrium fracture, CT head on admission showed involutional and microvascular changes with no sign of traumatic injury. patient by orthopedics department for surgery where they repaired the L distal radial fracture and performed an open reduction with internal fixation of the right distal humerus. Upon return to the medicine floor, patient was experiencing episode of shortness of breath, physical exam at the  time was positive for trace crackles and wheezes so the patient was given 1 treatment of duonebs and 20 iv lasix which lead to patient improvement, chest x-ray ordered at the time showed Progressive interstitial pulmonary edema with     Normal heart size Bibasilar atelectasis and a Small left pleural effusion. After duoneb and lasix patient continued to be asymptomatic throughout the rest of her stay. At time of discharge, all VSS patient able to move out of bed to chair, return precautions discussed-patient and family verbalized understanding. 85 y/o F w/ a PMHx of breast cancer who transferred from [facility] after having a mechanical fall and falling on her outstretched hands. Patient admitted to medicine floor and evaluated by orthopedic service. Imaging upon admission showed: Left clavicular fracture, left forearm and wrist fracture, R elbow fracture and manubrium fracture, CT head on admission showed involutional and microvascular changes with no sign of traumatic injury. patient by orthopedics department for surgery where they repaired the L distal radial fracture and performed an open reduction with internal fixation of the right distal humerus. Upon return to the medicine floor, patient was experiencing episode of shortness of breath, physical exam at the  time was positive for trace crackles and wheezes so the patient was given 1 treatment of duonebs and 20 iv lasix which lead to patient improvement, chest x-ray ordered at the time showed Progressive interstitial pulmonary edema with     Normal heart size Bibasilar atelectasis and a Small left pleural effusion. After duoneb and lasix patient continued to be asymptomatic throughout the rest of her stay. At time of discharge, all VSS patient able to move out of bed to chair, return precautions discussed-patient and family verbalized understanding. PT and OT consulted and patient recommended for [facility]. THerefore patient determined stable for discharge to [facility].

## 2019-07-11 NOTE — PROGRESS NOTE ADULT - ASSESSMENT
83 y/o F w/ a PMHx of breast cancer and hypertension who transferred from [facility] after having a mechanical fall and falling on her outstretched hands in her friend's house in the [facility] on 7/5/19. Pt does not have imaging done with her from [facility]. Patient pending Xray of b/l wrist and prep for hand surgery today.

## 2019-07-11 NOTE — PROGRESS NOTE ADULT - SUBJECTIVE AND OBJECTIVE BOX
OVERNIGHT EVENTS: No acute events overnight    SUBJECTIVE / INTERVAL HPI: Patient seen and examined at bedside. Patient says feeling much better, denies cp, sob, dysphagia, cough, light-headedness, palpitations.     Review of systems negative except as noted above.     VITAL SIGNS:  Vital Signs Last 24 Hrs  T(C): 37 (2019 06:06), Max: 37.2 (10 Jul 2019 12:31)  T(F): 98.6 (2019 06:06), Max: 98.9 (10 Jul 2019 12:31)  HR: 77 (2019 06:06) (71 - 98)  BP: 151/52 (2019 06:06) (111/60 - 151/52)  BP(mean): --  RR: 16 (2019 06:06) (16 - 16)  SpO2: 94% (2019 06:06) (94% - 98%)      07-10-19 @ 07:01  -  19 @ 07:00  --------------------------------------------------------  IN: 0 mL / OUT: 150 mL / NET: -150 mL        PHYSICAL EXAM:    General: Patient resting comfortably in bed with slings on both arms, patient with nasal cannula but not using it  HEENT: Bandage along R eyebrow,  Neck: supple, no JVD  Cardiovascular: +S1/S2; RRR 2/6 systolic ejection murmur heard at ADELE and LEON boarder  Respiratory: clear breath sounds anteriorly and along the axilla  Gastrointestinal: soft, NT/ND; +BS  Extremities: WWP; able to move digits on both hands  Vascular: 2+ radial, DP pulses B/L    MEDICATIONS:  MEDICATIONS  (STANDING):  ALPRAZolam 0.5 milliGRAM(s) Oral at bedtime  amLODIPine   Tablet 10 milliGRAM(s) Oral daily  aspirin enteric coated 325 milliGRAM(s) Oral two times a day  atorvastatin 20 milliGRAM(s) Oral at bedtime  buPROPion XL . 450 milliGRAM(s) Oral daily  docusate sodium 100 milliGRAM(s) Oral three times a day  nebivolol 10 milliGRAM(s) Oral daily  pantoprazole    Tablet 40 milliGRAM(s) Oral before breakfast  traZODone 50 milliGRAM(s) Oral at bedtime    MEDICATIONS  (PRN):  acetaminophen   Tablet .. 650 milliGRAM(s) Oral every 6 hours PRN Temp greater or equal to 38C (100.4F), Mild Pain (1 - 3)  acetaminophen   Tablet .. 650 milliGRAM(s) Oral every 6 hours PRN Moderate Pain (4 - 6)  guaiFENesin    Syrup 200 milliGRAM(s) Oral every 6 hours PRN phlegm  ondansetron Injectable 4 milliGRAM(s) IV Push every 6 hours PRN Nausea and/or Vomiting  oxyCODONE    IR 10 milliGRAM(s) Oral every 4 hours PRN Severe Pain (7 - 10)  oxyCODONE    IR 5 milliGRAM(s) Oral every 4 hours PRN Moderate Pain (4 - 6)      ALLERGIES:  Allergies    No Known Allergies    Intolerances      LABS:      Urinalysis Basic - ( 2019 12:09 )    Color: Yellow / Appearance: Clear / S.015 / pH: x  Gluc: x / Ketone: NEGATIVE  / Bili: Negative / Urobili: 0.2 E.U./dL   Blood: x / Protein: Trace mg/dL / Nitrite: NEGATIVE   Leuk Esterase: NEGATIVE / RBC: < 5 /HPF / WBC < 5 /HPF   Sq Epi: x / Non Sq Epi: 5-10 /HPF / Bacteria: Present /HPF      CAPILLARY BLOOD GLUCOSE              RADIOLOGY & ADDITIONAL TESTS: Reviewed.

## 2019-07-11 NOTE — DISCHARGE NOTE PROVIDER - NSDCCPCAREPLAN_GEN_ALL_CORE_FT
PRINCIPAL DISCHARGE DIAGNOSIS  Diagnosis: HTN (hypertension)  Assessment and Plan of Treatment: You have a history of high blood pressure. Please continue to take your blood pressure medication-Norvasc, and follow up with Dr. Barry your primary cardiolgist. PRINCIPAL DISCHARGE DIAGNOSIS  Diagnosis: Fractures involving multiple body regions  Assessment and Plan of Treatment: You had fractures of your left arm and wrist as well as your right arm and wrist after a fall. You underwent surgery with the orthopedic surgeons. You were seen by the physical therapist and occupational therapists for which you were recommended discharge to a subacute rehabilitation. Therefore you have been determined stable for discharge and will follow up with the orthopedic doctor, Dr. Stewart as an outpatient.      SECONDARY DISCHARGE DIAGNOSES  Diagnosis: HTN (hypertension)  Assessment and Plan of Treatment: You have a history of high blood pressure. Please continue to take your blood pressure medication-Norvasc, and follow up with Dr. Barry your primary cardiolgist.    Diagnosis: HTN (hypertension)  Assessment and Plan of Treatment: You have a history of high blood pressure. Please continue to take your blood pressure medication-Norvasc, and follow up with Dr. Barry your primary cardiolgist. PRINCIPAL DISCHARGE DIAGNOSIS  Diagnosis: Fractures involving multiple body regions  Assessment and Plan of Treatment: You had fractures of your left arm and wrist as well as your right arm and wrist after a fall. You underwent surgery with the orthopedic surgeons. You were seen by the physical therapist and occupational therapists for which you were recommended discharge to a subacute rehabilitation. Therefore you have been determined stable for discharge and will follow up with the orthopedic doctor, Dr. Stewart as an outpatient.      SECONDARY DISCHARGE DIAGNOSES  Diagnosis: HTN (hypertension)  Assessment and Plan of Treatment: You have a history of high blood pressure. Please continue to take your blood pressure medication-Norvasc, and follow up with Dr. Barry your primary cardiolgist.

## 2019-07-11 NOTE — PHYSICAL THERAPY INITIAL EVALUATION ADULT - PERTINENT HX OF CURRENT PROBLEM, REHAB EVAL
84 year old female presenting s/p fall with outstretched hand sustaining b/l UE injuries and laceration to head with head imaging being negative.

## 2019-07-11 NOTE — PHYSICAL THERAPY INITIAL EVALUATION ADULT - ACTIVE RANGE OF MOTION EXAMINATION, REHAB EVAL
b/l UE not assessed 2/2 fractures/bilateral  lower extremity Active ROM was WFL (within functional limits)

## 2019-07-11 NOTE — PROGRESS NOTE ADULT - PROBLEM SELECTOR PLAN 1
- Ortho following  - Surgery performed: Distal radius fracture repair, Open reduction of injury of right distal humerus Early in AM of 7/10    #SOB s/p procedure upon return to the floor-resolved  -currently patient no longer with SOB, O2sats appropriate, no accessory muscle use  with inspiration

## 2019-07-11 NOTE — PHYSICAL THERAPY INITIAL EVALUATION ADULT - GAIT DEVIATIONS NOTED, PT EVAL
decreased tay/decreased velocity of limb motion/decreased step length/decreased stride length/decreased weight-shifting ability

## 2019-07-11 NOTE — PROGRESS NOTE ADULT - ASSESSMENT
Hemodynamically stable  -no BM - bowel regimen  -encourage PO  -blood pressure controlled  =pain controlled  -sleeping better with medciation and less anxious  -DVT prophylaxis would recommend Xarelto 10mg daily (DVT dosing)  -medically stable to transfer to [facility]

## 2019-07-11 NOTE — DISCHARGE NOTE PROVIDER - NSDCCPTREATMENT_GEN_ALL_CORE_FT
PRINCIPAL PROCEDURE  Procedure: Repair of distal radius  Findings and Treatment: You had a fracture in your left arm/wrist. The orthopedic surgery team repaired the fracture. Monitor your hand for signs of tingling and numbness. If this occurs contact your surgeon and return to the hospital immeditely. Please follow up with your surgeon as an outpatient.      SECONDARY PROCEDURE  Procedure: Open reduction of injury of right distal humerus  Findings and Treatment: You had a fracture of your right elbow. The orthopedic surgery team repaired the fracture. Monitor your hand for signs of tingling and numbness. If this occurs contact your surgeon and return to the hospital immeditely. Please follow up with your surgeon as an outpatient.

## 2019-07-11 NOTE — DIETITIAN INITIAL EVALUATION ADULT. - OTHER INFO
84F w/ a PMHx of breast cancer who transferred from [facility] after having a mechanical fall. Imaging upon admission showed: Left clavicular fracture, left forearm and wrist fracture, R elbow fracture and manubrium fracture, CT head on admission showed involutional and microvascular changes with no sign of traumatic injury. Now s/p ORIF of elbow and wrist 7/10, stable at this time, pending DC. No noted n/v/d/c, chewing/ swallowing issues or pain impacting intake, skin is intact. NKFA or changes in wt noted. Tolerating diet without complaint. Will continue to follow per protocol.

## 2019-07-11 NOTE — PHYSICAL THERAPY INITIAL EVALUATION ADULT - ADDITIONAL COMMENTS
Pt has aid during the day and house attendant being alone at night, no prior use of DME, no other hx of falls, independent PTA, no stairs in her home.

## 2019-07-12 ENCOUNTER — TRANSCRIPTION ENCOUNTER (OUTPATIENT)
Age: 84
End: 2019-07-12

## 2019-07-12 VITALS
RESPIRATION RATE: 16 BRPM | TEMPERATURE: 99 F | OXYGEN SATURATION: 96 % | SYSTOLIC BLOOD PRESSURE: 118 MMHG | DIASTOLIC BLOOD PRESSURE: 70 MMHG | HEART RATE: 69 BPM

## 2019-07-12 DIAGNOSIS — D64.9 ANEMIA, UNSPECIFIED: ICD-10-CM

## 2019-07-12 LAB
ALBUMIN SERPL ELPH-MCNC: 2.8 G/DL — LOW (ref 3.3–5)
ALP SERPL-CCNC: 74 U/L — SIGNIFICANT CHANGE UP (ref 40–120)
ALT FLD-CCNC: 8 U/L — LOW (ref 10–45)
ANION GAP SERPL CALC-SCNC: 12 MMOL/L — SIGNIFICANT CHANGE UP (ref 5–17)
AST SERPL-CCNC: 24 U/L — SIGNIFICANT CHANGE UP (ref 10–40)
BILIRUB DIRECT SERPL-MCNC: <0.2 MG/DL — SIGNIFICANT CHANGE UP (ref 0–0.2)
BILIRUB INDIRECT FLD-MCNC: >0.1 MG/DL — LOW (ref 0.2–1)
BILIRUB SERPL-MCNC: 0.3 MG/DL — SIGNIFICANT CHANGE UP (ref 0.2–1.2)
BUN SERPL-MCNC: 34 MG/DL — HIGH (ref 7–23)
CALCIUM SERPL-MCNC: 7.9 MG/DL — LOW (ref 8.4–10.5)
CHLORIDE SERPL-SCNC: 94 MMOL/L — LOW (ref 96–108)
CK MB CFR SERPL CALC: 1.3 NG/ML — SIGNIFICANT CHANGE UP (ref 0–6.7)
CO2 SERPL-SCNC: 27 MMOL/L — SIGNIFICANT CHANGE UP (ref 22–31)
CREAT SERPL-MCNC: 0.96 MG/DL — SIGNIFICANT CHANGE UP (ref 0.5–1.3)
D DIMER BLD IA.RAPID-MCNC: 1136 NG/ML DDU — HIGH
GLUCOSE SERPL-MCNC: 135 MG/DL — HIGH (ref 70–99)
HAPTOGLOB SERPL-MCNC: 556 MG/DL — HIGH (ref 34–200)
HCT VFR BLD CALC: 24 % — LOW (ref 34.5–45)
HGB BLD-MCNC: 7.7 G/DL — LOW (ref 11.5–15.5)
LDH SERPL L TO P-CCNC: 306 U/L — HIGH (ref 50–242)
MAGNESIUM SERPL-MCNC: 2.5 MG/DL — SIGNIFICANT CHANGE UP (ref 1.6–2.6)
MCHC RBC-ENTMCNC: 30.9 PG — SIGNIFICANT CHANGE UP (ref 27–34)
MCHC RBC-ENTMCNC: 32.1 GM/DL — SIGNIFICANT CHANGE UP (ref 32–36)
MCV RBC AUTO: 96.4 FL — SIGNIFICANT CHANGE UP (ref 80–100)
NRBC # BLD: 0 /100 WBCS — SIGNIFICANT CHANGE UP (ref 0–0)
PLATELET # BLD AUTO: 309 K/UL — SIGNIFICANT CHANGE UP (ref 150–400)
POTASSIUM SERPL-MCNC: 3.9 MMOL/L — SIGNIFICANT CHANGE UP (ref 3.5–5.3)
POTASSIUM SERPL-SCNC: 3.9 MMOL/L — SIGNIFICANT CHANGE UP (ref 3.5–5.3)
PROT SERPL-MCNC: 6 G/DL — SIGNIFICANT CHANGE UP (ref 6–8.3)
RBC # BLD: 2.49 M/UL — LOW (ref 3.8–5.2)
RBC # FLD: 13.1 % — SIGNIFICANT CHANGE UP (ref 10.3–14.5)
RETICS #: 43.1 K/UL — SIGNIFICANT CHANGE UP (ref 25–125)
RETICS/RBC NFR: 1.7 % — SIGNIFICANT CHANGE UP (ref 0.5–2.5)
SODIUM SERPL-SCNC: 133 MMOL/L — LOW (ref 135–145)
TROPONIN T SERPL-MCNC: <0.01 NG/ML — SIGNIFICANT CHANGE UP (ref 0–0.01)
WBC # BLD: 7.62 K/UL — SIGNIFICANT CHANGE UP (ref 3.8–10.5)
WBC # FLD AUTO: 7.62 K/UL — SIGNIFICANT CHANGE UP (ref 3.8–10.5)

## 2019-07-12 PROCEDURE — 86900 BLOOD TYPING SEROLOGIC ABO: CPT

## 2019-07-12 PROCEDURE — 84484 ASSAY OF TROPONIN QUANT: CPT

## 2019-07-12 PROCEDURE — 93005 ELECTROCARDIOGRAM TRACING: CPT

## 2019-07-12 PROCEDURE — 73070 X-RAY EXAM OF ELBOW: CPT

## 2019-07-12 PROCEDURE — 86870 RBC ANTIBODY IDENTIFICATION: CPT

## 2019-07-12 PROCEDURE — 85730 THROMBOPLASTIN TIME PARTIAL: CPT

## 2019-07-12 PROCEDURE — 36415 COLL VENOUS BLD VENIPUNCTURE: CPT

## 2019-07-12 PROCEDURE — 80048 BASIC METABOLIC PNL TOTAL CA: CPT

## 2019-07-12 PROCEDURE — 81001 URINALYSIS AUTO W/SCOPE: CPT

## 2019-07-12 PROCEDURE — C1769: CPT

## 2019-07-12 PROCEDURE — C1889: CPT

## 2019-07-12 PROCEDURE — 71045 X-RAY EXAM CHEST 1 VIEW: CPT | Mod: 26,76

## 2019-07-12 PROCEDURE — 82550 ASSAY OF CK (CPK): CPT

## 2019-07-12 PROCEDURE — 83010 ASSAY OF HAPTOGLOBIN QUANT: CPT

## 2019-07-12 PROCEDURE — 73110 X-RAY EXAM OF WRIST: CPT

## 2019-07-12 PROCEDURE — 97110 THERAPEUTIC EXERCISES: CPT

## 2019-07-12 PROCEDURE — C1713: CPT

## 2019-07-12 PROCEDURE — 85379 FIBRIN DEGRADATION QUANT: CPT

## 2019-07-12 PROCEDURE — 73100 X-RAY EXAM OF WRIST: CPT

## 2019-07-12 PROCEDURE — 85045 AUTOMATED RETICULOCYTE COUNT: CPT

## 2019-07-12 PROCEDURE — 86901 BLOOD TYPING SEROLOGIC RH(D): CPT

## 2019-07-12 PROCEDURE — 73080 X-RAY EXAM OF ELBOW: CPT

## 2019-07-12 PROCEDURE — 83735 ASSAY OF MAGNESIUM: CPT

## 2019-07-12 PROCEDURE — 97535 SELF CARE MNGMENT TRAINING: CPT

## 2019-07-12 PROCEDURE — 86850 RBC ANTIBODY SCREEN: CPT

## 2019-07-12 PROCEDURE — 97162 PT EVAL MOD COMPLEX 30 MIN: CPT

## 2019-07-12 PROCEDURE — 76000 FLUOROSCOPY <1 HR PHYS/QHP: CPT

## 2019-07-12 PROCEDURE — 85027 COMPLETE CBC AUTOMATED: CPT

## 2019-07-12 PROCEDURE — 71045 X-RAY EXAM CHEST 1 VIEW: CPT

## 2019-07-12 PROCEDURE — 97161 PT EVAL LOW COMPLEX 20 MIN: CPT

## 2019-07-12 PROCEDURE — 86880 COOMBS TEST DIRECT: CPT

## 2019-07-12 PROCEDURE — 94640 AIRWAY INHALATION TREATMENT: CPT

## 2019-07-12 PROCEDURE — 85610 PROTHROMBIN TIME: CPT

## 2019-07-12 PROCEDURE — 73200 CT UPPER EXTREMITY W/O DYE: CPT

## 2019-07-12 PROCEDURE — 73030 X-RAY EXAM OF SHOULDER: CPT

## 2019-07-12 PROCEDURE — 83615 LACTATE (LD) (LDH) ENZYME: CPT

## 2019-07-12 PROCEDURE — 80076 HEPATIC FUNCTION PANEL: CPT

## 2019-07-12 PROCEDURE — 82553 CREATINE MB FRACTION: CPT

## 2019-07-12 RX ORDER — ACETAMINOPHEN 500 MG
2 TABLET ORAL
Qty: 0 | Refills: 0 | DISCHARGE
Start: 2019-07-12

## 2019-07-12 RX ORDER — OXYCODONE HYDROCHLORIDE 5 MG/1
1 TABLET ORAL
Qty: 0 | Refills: 0 | DISCHARGE
Start: 2019-07-12

## 2019-07-12 RX ORDER — POTASSIUM CHLORIDE 20 MEQ
10 PACKET (EA) ORAL ONCE
Refills: 0 | Status: COMPLETED | OUTPATIENT
Start: 2019-07-12 | End: 2019-07-12

## 2019-07-12 RX ORDER — RIVAROXABAN 15 MG-20MG
1 KIT ORAL
Qty: 30 | Refills: 0
Start: 2019-07-12 | End: 2019-08-10

## 2019-07-12 RX ORDER — RIVAROXABAN 15 MG-20MG
10 KIT ORAL DAILY
Refills: 0 | Status: DISCONTINUED | OUTPATIENT
Start: 2019-07-12 | End: 2019-07-12

## 2019-07-12 RX ADMIN — Medication 100 MILLIGRAM(S): at 06:37

## 2019-07-12 RX ADMIN — AMLODIPINE BESYLATE 10 MILLIGRAM(S): 2.5 TABLET ORAL at 06:37

## 2019-07-12 RX ADMIN — NEBIVOLOL HYDROCHLORIDE 10 MILLIGRAM(S): 5 TABLET ORAL at 06:37

## 2019-07-12 RX ADMIN — OXYCODONE HYDROCHLORIDE 5 MILLIGRAM(S): 5 TABLET ORAL at 14:40

## 2019-07-12 RX ADMIN — BUPROPION HYDROCHLORIDE 450 MILLIGRAM(S): 150 TABLET, EXTENDED RELEASE ORAL at 12:32

## 2019-07-12 RX ADMIN — Medication 325 MILLIGRAM(S): at 06:37

## 2019-07-12 RX ADMIN — PANTOPRAZOLE SODIUM 40 MILLIGRAM(S): 20 TABLET, DELAYED RELEASE ORAL at 06:37

## 2019-07-12 RX ADMIN — RIVAROXABAN 10 MILLIGRAM(S): KIT at 14:40

## 2019-07-12 NOTE — PROGRESS NOTE ADULT - PROBLEM SELECTOR PLAN 6
RCRI 0, class I risk with 3.9% for cardiovascular complication. METS 4.   - Continue with beta blocker perioperatively,  - EKG, with NSR and some ST changes in lateral leads, no active chest pain. No further work up at this time.

## 2019-07-12 NOTE — PROGRESS NOTE ADULT - PROBLEM SELECTOR PLAN 2
Pt fell at friend's home in [facility] 7/5, admitted, x rays showed b/l wrist fracture, transferred to [facility] for hand surgery per Dr. Barry and will consult Dr. Win Stewart. Patient with 2/6 systolic ejection murmur-consider AS as precipitant of fall.  -X ray wrists, elbow and shoulder performed today  -Pt denies hitting head, but has bandage on right side of head, says CT was neg at [facility] for any bleed  -consider echo for evaluation of AS

## 2019-07-12 NOTE — PROGRESS NOTE ADULT - PROBLEM SELECTOR PLAN 8
F: 80cc/hr NS IVF  E: Replete electrolytes to maintain K>4 and Mg>2  N:  NPO prior to procedure then regular Diet as tolerated post op

## 2019-07-12 NOTE — PROGRESS NOTE ADULT - PROBLEM SELECTOR PLAN 5
RCRI 0, class I risk with 3.9% for cardiovascular complication. METS 4.   - Continue with beta blocker perioperatively,  - EKG, with NSR and some ST changes in lateral leads, no active chest pain. No further work up at this time.
- 40 years ago, no mastectomy but removed lymph nodes out of left side, had chemo
RCRI 0, class I risk with 3.9% for cardiovascular complication. METS 4.   - Continue with beta blocker perioperatively,  - EKG, with NSR and some ST changes in lateral leads, no active chest pain. No further work up at this time.
RCRI 0, class I risk with 3.9% for cardiovascular complication. METS 4.   - Continue with beta blocker perioperatively,  - EKG, with NSR and some ST changes in lateral leads, no active chest pain. No further work up at this time.

## 2019-07-12 NOTE — PROGRESS NOTE ADULT - PROBLEM SELECTOR PLAN 3
Patient with pre-op hgb 8.8 on 7/8, currently hgb 7.7 on 7/12, patient not tachycardic, normotensive, low suspicion for acute bleed.  -consider iron studies  -follow up with ortho for evaluation of post op bleed

## 2019-07-12 NOTE — DISCHARGE NOTE NURSING/CASE MANAGEMENT/SOCIAL WORK - NSDCDPATPORTLINK_GEN_ALL_CORE
You can access the [facility] Portal, offered by [facility], by registering with the following website: [email or web address]

## 2019-07-12 NOTE — PROGRESS NOTE ADULT - PROBLEM SELECTOR PLAN 4
/77- 172/62  - c/o home amlodipine 10 mg + bystolic 10 mg qd
- 40 years ago, no mastectomy but removed lymph nodes out of left side, had chemo

## 2019-07-12 NOTE — PROGRESS NOTE ADULT - PROBLEM SELECTOR PLAN 7
F: 80cc/hr NS IVF  E: Replete electrolytes to maintain K>4 and Mg>2  N:  NPO prior to procedure then regular Diet as tolerated post op
VTE: Continue post op    FULL CODE    Dispo: Admit to [facility] for multiple fractures pending ortho procedure, pending PT evaluation
F: 80cc/hr NS IVF  E: Replete electrolytes to maintain K>4 and Mg>2  N:  NPO prior to procedure then regular Diet as tolerated post op
F: 80cc/hr NS IVF  E: Replete electrolytes to maintain K>4 and Mg>2  N:  NPO prior to procedure then regular Diet as tolerated post op

## 2019-07-12 NOTE — PROGRESS NOTE ADULT - REASON FOR ADMISSION
fractured wrists s/p fall

## 2019-07-12 NOTE — PROGRESS NOTE ADULT - SUBJECTIVE AND OBJECTIVE BOX
OVERNIGHT EVENTS: No acute events overnight    SUBJECTIVE / INTERVAL HPI: Patient seen and examined at bedside. Denies cp, sob, light-headedness, numbness and tingling of fingers.    Review of systems negative except as noted above.     VITAL SIGNS:  Vital Signs Last 24 Hrs  T(C): 37.4 (12 Jul 2019 06:12), Max: 37.4 (11 Jul 2019 22:00)  T(F): 99.4 (12 Jul 2019 06:12), Max: 99.4 (11 Jul 2019 22:00)  HR: 79 (12 Jul 2019 06:12) (65 - 79)  BP: 172/62 (12 Jul 2019 06:12) (127/65 - 172/62)  BP(mean): --  RR: 16 (12 Jul 2019 06:12) (16 - 16)  SpO2: 93% (12 Jul 2019 06:12) (91% - 93%)        PHYSICAL EXAM:  General: Patient resting comfortably in bed with slings on both arms, patient  on 3L nasal cannula  HEENT: Bandage along R eyebrow,  Neck: supple, no JVD  Cardiovascular: +S1/S2; RRR 2/6 systolic ejection murmur heard at ADELE and LEON boarder  Respiratory: clear breath sounds anteriorly and along the axilla  Gastrointestinal: soft, NT/ND; +BS  Extremities: WWP; able to move digits on both hands      MEDICATIONS:  MEDICATIONS  (STANDING):  ALPRAZolam 0.5 milliGRAM(s) Oral at bedtime  amLODIPine   Tablet 10 milliGRAM(s) Oral daily  aspirin enteric coated 325 milliGRAM(s) Oral two times a day  atorvastatin 20 milliGRAM(s) Oral at bedtime  buPROPion XL . 450 milliGRAM(s) Oral daily  docusate sodium 100 milliGRAM(s) Oral three times a day  nebivolol 10 milliGRAM(s) Oral daily  pantoprazole    Tablet 40 milliGRAM(s) Oral before breakfast  potassium chloride    Tablet ER 10 milliEquivalent(s) Oral once  traZODone 50 milliGRAM(s) Oral at bedtime    MEDICATIONS  (PRN):  acetaminophen   Tablet .. 650 milliGRAM(s) Oral every 6 hours PRN Temp greater or equal to 38C (100.4F), Mild Pain (1 - 3)  acetaminophen   Tablet .. 650 milliGRAM(s) Oral every 6 hours PRN Moderate Pain (4 - 6)  guaiFENesin    Syrup 200 milliGRAM(s) Oral every 6 hours PRN phlegm  ondansetron Injectable 4 milliGRAM(s) IV Push every 6 hours PRN Nausea and/or Vomiting  oxyCODONE    IR 5 milliGRAM(s) Oral every 6 hours PRN Severe Pain (7 - 10)      ALLERGIES:  Allergies    No Known Allergies    Intolerances        LABS:                        7.7    7.62  )-----------( 309      ( 12 Jul 2019 06:00 )             24.0     07-12    133<L>  |  94<L>  |  34<H>  ----------------------------<  135<H>  3.9   |  27  |  0.96    Ca    7.9<L>      12 Jul 2019 06:01  Mg     2.5     07-12          CAPILLARY BLOOD GLUCOSE              RADIOLOGY & ADDITIONAL TESTS: Reviewed.

## 2019-07-12 NOTE — PROGRESS NOTE ADULT - PROBLEM SELECTOR PROBLEM 1
Fractures involving multiple body regions

## 2019-07-12 NOTE — PROGRESS NOTE ADULT - PROBLEM SELECTOR PROBLEM 5
Preop exam for internal medicine
Breast cancer
Preop exam for internal medicine
Preop exam for internal medicine

## 2019-07-12 NOTE — PROGRESS NOTE ADULT - ASSESSMENT
85 y/o F w/ a PMHx of breast cancer and hypertension who transferred from [facility] after having a mechanical fall and falling on her outstretched hands in her friend's house in the [facility] on 7/5/19. Pt does not have imaging done with her from [facility]. Patient pending Xray of b/l wrist and prep for hand surgery today.

## 2019-07-12 NOTE — PROGRESS NOTE ADULT - PROVIDER SPECIALTY LIST ADULT
Cardiology
Cardiology
Internal Medicine
Internal Medicine
Orthopedics
Orthopedics
Cardiology
Internal Medicine
Internal Medicine

## 2019-07-12 NOTE — PROGRESS NOTE ADULT - PROBLEM SELECTOR PROBLEM 7
Nutrition, metabolism, and development symptoms
Prophylactic measure
Nutrition, metabolism, and development symptoms
Nutrition, metabolism, and development symptoms

## 2019-07-17 DIAGNOSIS — S42.002A FRACTURE OF UNSPECIFIED PART OF LEFT CLAVICLE, INITIAL ENCOUNTER FOR CLOSED FRACTURE: ICD-10-CM

## 2019-07-17 DIAGNOSIS — S62.90XA UNSPECIFIED FRACTURE OF UNSPECIFIED WRIST AND HAND, INITIAL ENCOUNTER FOR CLOSED FRACTURE: ICD-10-CM

## 2019-07-17 DIAGNOSIS — D64.9 ANEMIA, UNSPECIFIED: ICD-10-CM

## 2019-07-17 DIAGNOSIS — Z87.891 PERSONAL HISTORY OF NICOTINE DEPENDENCE: ICD-10-CM

## 2019-07-17 DIAGNOSIS — I10 ESSENTIAL (PRIMARY) HYPERTENSION: ICD-10-CM
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
## 2019-07-17 DIAGNOSIS — K21.9 GASTRO-ESOPHAGEAL REFLUX DISEASE WITHOUT ESOPHAGITIS: ICD-10-CM

## 2019-07-17 DIAGNOSIS — J90 PLEURAL EFFUSION, NOT ELSEWHERE CLASSIFIED: ICD-10-CM

## 2019-07-17 DIAGNOSIS — E78.5 HYPERLIPIDEMIA, UNSPECIFIED: ICD-10-CM

## 2019-07-17 DIAGNOSIS — M43.8X4 OTHER SPECIFIED DEFORMING DORSOPATHIES, THORACIC REGION: ICD-10-CM

## 2019-07-17 DIAGNOSIS — S22.21XA FRACTURE OF MANUBRIUM, INITIAL ENCOUNTER FOR CLOSED FRACTURE: ICD-10-CM

## 2019-07-17 DIAGNOSIS — J98.11 ATELECTASIS: ICD-10-CM

## 2019-07-17 DIAGNOSIS — S52.615A NONDISPLACED FRACTURE OF LEFT ULNA STYLOID PROCESS, INITIAL ENCOUNTER FOR CLOSED FRACTURE: ICD-10-CM

## 2019-07-17 DIAGNOSIS — S52.572A OTHER INTRAARTICULAR FRACTURE OF LOWER END OF LEFT RADIUS, INITIAL ENCOUNTER FOR CLOSED FRACTURE: ICD-10-CM

## 2019-07-17 DIAGNOSIS — Z92.21 PERSONAL HISTORY OF ANTINEOPLASTIC CHEMOTHERAPY: ICD-10-CM

## 2019-07-17 DIAGNOSIS — R01.1 CARDIAC MURMUR, UNSPECIFIED: ICD-10-CM
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
## 2019-07-17 DIAGNOSIS — M17.0 BILATERAL PRIMARY OSTEOARTHRITIS OF KNEE: ICD-10-CM

## 2019-07-17 DIAGNOSIS — F41.9 ANXIETY DISORDER, UNSPECIFIED: ICD-10-CM

## 2019-07-17 DIAGNOSIS — S42.471A: ICD-10-CM

## 2019-07-17 DIAGNOSIS — Z85.3 PERSONAL HISTORY OF MALIGNANT NEOPLASM OF BREAST: ICD-10-CM

## 2019-07-17 DIAGNOSIS — Z98.891 HISTORY OF UTERINE SCAR FROM PREVIOUS SURGERY: ICD-10-CM
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
## 2019-07-17 DIAGNOSIS — Z86.73 PERSONAL HISTORY OF TRANSIENT ISCHEMIC ATTACK (TIA), AND CEREBRAL INFARCTION WITHOUT RESIDUAL DEFICITS: ICD-10-CM

## 2019-07-17 DIAGNOSIS — S52.571A OTHER INTRAARTICULAR FRACTURE OF LOWER END OF RIGHT RADIUS, INITIAL ENCOUNTER FOR CLOSED FRACTURE: ICD-10-CM

## 2019-07-17 DIAGNOSIS — Z98.890 OTHER SPECIFIED POSTPROCEDURAL STATES: ICD-10-CM

## 2019-07-17 DIAGNOSIS — J81.1 CHRONIC PULMONARY EDEMA: ICD-10-CM

## 2019-07-17 DIAGNOSIS — M81.0 AGE-RELATED OSTEOPOROSIS WITHOUT CURRENT PATHOLOGICAL FRACTURE: ICD-10-CM

## 2019-12-10 VITALS
HEART RATE: 107 BPM | TEMPERATURE: 99 F | RESPIRATION RATE: 18 BRPM | OXYGEN SATURATION: 96 % | DIASTOLIC BLOOD PRESSURE: 77 MMHG | SYSTOLIC BLOOD PRESSURE: 167 MMHG

## 2019-12-10 NOTE — ED ADULT TRIAGE NOTE - ARRIVAL INFO ADDITIONAL COMMENTS
pt c/o sob since this afternoon.  seen by PMD who sent her home.  pt was put on an antibiotic for sinus infection yesterday and then she began to cough so they called the doctor who then told her to stop the med and come to the ER.

## 2019-12-10 NOTE — ED CLERICAL - NS ED CLERK NOTE PRE-ARRIVAL INFORMATION; ADDITIONAL PRE-ARRIVAL INFORMATION
CHECO RICE MICHELL [place] 4 M/O- FRACTURED WRIST. 2 DAYS AGO ? SINUS HA, GIVEN SOLUMEDROL, ZPACK. SOB TODAY. CxR NEGATIVE. SpO2 98% ON TOOM AIR. ST OK EKG. WHEEZING ON EXAM. OHx CHF/ ASHTHMA. [facility]

## 2019-12-11 ENCOUNTER — INPATIENT (INPATIENT)
Facility: HOSPITAL | Age: 84
LOS: 1 days | Discharge: HOME HEALTH | DRG: 918 | End: 2019-12-13
Attending: INTERNAL MEDICINE | Admitting: INTERNAL MEDICINE
Payer: MEDICARE

## 2019-12-11 DIAGNOSIS — Z98.890 OTHER SPECIFIED POSTPROCEDURAL STATES: Chronic | ICD-10-CM

## 2019-12-11 DIAGNOSIS — S42.301A UNSPECIFIED FRACTURE OF SHAFT OF HUMERUS, RIGHT ARM, INITIAL ENCOUNTER FOR CLOSED FRACTURE: ICD-10-CM

## 2019-12-11 DIAGNOSIS — Z91.89 OTHER SPECIFIED PERSONAL RISK FACTORS, NOT ELSEWHERE CLASSIFIED: ICD-10-CM

## 2019-12-11 DIAGNOSIS — Z29.9 ENCOUNTER FOR PROPHYLACTIC MEASURES, UNSPECIFIED: ICD-10-CM

## 2019-12-11 DIAGNOSIS — E87.1 HYPO-OSMOLALITY AND HYPONATREMIA: ICD-10-CM

## 2019-12-11 DIAGNOSIS — R06.02 SHORTNESS OF BREATH: ICD-10-CM

## 2019-12-11 DIAGNOSIS — R91.8 OTHER NONSPECIFIC ABNORMAL FINDING OF LUNG FIELD: ICD-10-CM

## 2019-12-11 DIAGNOSIS — J20.9 ACUTE BRONCHITIS, UNSPECIFIED: ICD-10-CM

## 2019-12-11 DIAGNOSIS — M21.611 BUNION OF RIGHT FOOT: Chronic | ICD-10-CM

## 2019-12-11 DIAGNOSIS — C50.919 MALIGNANT NEOPLASM OF UNSPECIFIED SITE OF UNSPECIFIED FEMALE BREAST: ICD-10-CM

## 2019-12-11 DIAGNOSIS — D64.9 ANEMIA, UNSPECIFIED: ICD-10-CM

## 2019-12-11 DIAGNOSIS — J98.11 ATELECTASIS: ICD-10-CM

## 2019-12-11 LAB
ALBUMIN SERPL ELPH-MCNC: 4 G/DL — SIGNIFICANT CHANGE UP (ref 3.3–5)
ALP SERPL-CCNC: 110 U/L — SIGNIFICANT CHANGE UP (ref 40–120)
ALT FLD-CCNC: 15 U/L — SIGNIFICANT CHANGE UP (ref 10–45)
ANION GAP SERPL CALC-SCNC: 15 MMOL/L — SIGNIFICANT CHANGE UP (ref 5–17)
ANION GAP SERPL CALC-SCNC: 15 MMOL/L — SIGNIFICANT CHANGE UP (ref 5–17)
APTT BLD: 41.6 SEC — HIGH (ref 27.5–36.3)
AST SERPL-CCNC: 25 U/L — SIGNIFICANT CHANGE UP (ref 10–40)
BASE EXCESS BLDV CALC-SCNC: 3.5 MMOL/L — SIGNIFICANT CHANGE UP
BASOPHILS # BLD AUTO: 0.05 K/UL — SIGNIFICANT CHANGE UP (ref 0–0.2)
BASOPHILS # BLD AUTO: 0.06 K/UL — SIGNIFICANT CHANGE UP (ref 0–0.2)
BASOPHILS NFR BLD AUTO: 0.6 % — SIGNIFICANT CHANGE UP (ref 0–2)
BASOPHILS NFR BLD AUTO: 0.8 % — SIGNIFICANT CHANGE UP (ref 0–2)
BILIRUB SERPL-MCNC: 0.2 MG/DL — SIGNIFICANT CHANGE UP (ref 0.2–1.2)
BUN SERPL-MCNC: 15 MG/DL — SIGNIFICANT CHANGE UP (ref 7–23)
BUN SERPL-MCNC: 17 MG/DL — SIGNIFICANT CHANGE UP (ref 7–23)
CALCIUM SERPL-MCNC: 9.1 MG/DL — SIGNIFICANT CHANGE UP (ref 8.4–10.5)
CALCIUM SERPL-MCNC: 9.4 MG/DL — SIGNIFICANT CHANGE UP (ref 8.4–10.5)
CHLORIDE SERPL-SCNC: 93 MMOL/L — LOW (ref 96–108)
CHLORIDE SERPL-SCNC: 97 MMOL/L — SIGNIFICANT CHANGE UP (ref 96–108)
CHOLEST SERPL-MCNC: 224 MG/DL — HIGH (ref 10–199)
CK MB CFR SERPL CALC: 1.7 NG/ML — SIGNIFICANT CHANGE UP (ref 0–6.7)
CO2 SERPL-SCNC: 21 MMOL/L — LOW (ref 22–31)
CO2 SERPL-SCNC: 22 MMOL/L — SIGNIFICANT CHANGE UP (ref 22–31)
CREAT SERPL-MCNC: 0.71 MG/DL — SIGNIFICANT CHANGE UP (ref 0.5–1.3)
CREAT SERPL-MCNC: 0.73 MG/DL — SIGNIFICANT CHANGE UP (ref 0.5–1.3)
D DIMER BLD IA.RAPID-MCNC: <150 NG/ML DDU — SIGNIFICANT CHANGE UP
EOSINOPHIL # BLD AUTO: 0.05 K/UL — SIGNIFICANT CHANGE UP (ref 0–0.5)
EOSINOPHIL # BLD AUTO: 0.06 K/UL — SIGNIFICANT CHANGE UP (ref 0–0.5)
EOSINOPHIL NFR BLD AUTO: 0.6 % — SIGNIFICANT CHANGE UP (ref 0–6)
EOSINOPHIL NFR BLD AUTO: 0.7 % — SIGNIFICANT CHANGE UP (ref 0–6)
FERRITIN SERPL-MCNC: 30 NG/ML — SIGNIFICANT CHANGE UP (ref 15–150)
GAS PNL BLDV: SIGNIFICANT CHANGE UP
GLUCOSE SERPL-MCNC: 108 MG/DL — HIGH (ref 70–99)
GLUCOSE SERPL-MCNC: 125 MG/DL — HIGH (ref 70–99)
HBA1C BLD-MCNC: 4.7 % — SIGNIFICANT CHANGE UP (ref 4–5.6)
HCO3 BLDV-SCNC: 24 MMOL/L — SIGNIFICANT CHANGE UP (ref 20–27)
HCT VFR BLD CALC: 32.3 % — LOW (ref 34.5–45)
HCT VFR BLD CALC: 32.7 % — LOW (ref 34.5–45)
HDLC SERPL-MCNC: 142 MG/DL — SIGNIFICANT CHANGE UP
HGB BLD-MCNC: 10.3 G/DL — LOW (ref 11.5–15.5)
HGB BLD-MCNC: 10.6 G/DL — LOW (ref 11.5–15.5)
IMM GRANULOCYTES NFR BLD AUTO: 0.3 % — SIGNIFICANT CHANGE UP (ref 0–1.5)
IMM GRANULOCYTES NFR BLD AUTO: 0.3 % — SIGNIFICANT CHANGE UP (ref 0–1.5)
INR BLD: 1.41 — HIGH (ref 0.88–1.16)
IRON SATN MFR SERPL: 132 UG/DL — SIGNIFICANT CHANGE UP (ref 30–160)
IRON SATN MFR SERPL: 42 % — SIGNIFICANT CHANGE UP (ref 14–50)
LACTATE SERPL-SCNC: 1.3 MMOL/L — SIGNIFICANT CHANGE UP (ref 0.5–2)
LIPID PNL WITH DIRECT LDL SERPL: 68 MG/DL — SIGNIFICANT CHANGE UP
LYMPHOCYTES # BLD AUTO: 1.02 K/UL — SIGNIFICANT CHANGE UP (ref 1–3.3)
LYMPHOCYTES # BLD AUTO: 1.32 K/UL — SIGNIFICANT CHANGE UP (ref 1–3.3)
LYMPHOCYTES # BLD AUTO: 12.9 % — LOW (ref 13–44)
LYMPHOCYTES # BLD AUTO: 14.8 % — SIGNIFICANT CHANGE UP (ref 13–44)
MAGNESIUM SERPL-MCNC: 2.1 MG/DL — SIGNIFICANT CHANGE UP (ref 1.6–2.6)
MCHC RBC-ENTMCNC: 29.9 PG — SIGNIFICANT CHANGE UP (ref 27–34)
MCHC RBC-ENTMCNC: 30.5 PG — SIGNIFICANT CHANGE UP (ref 27–34)
MCHC RBC-ENTMCNC: 31.9 GM/DL — LOW (ref 32–36)
MCHC RBC-ENTMCNC: 32.4 GM/DL — SIGNIFICANT CHANGE UP (ref 32–36)
MCV RBC AUTO: 92.1 FL — SIGNIFICANT CHANGE UP (ref 80–100)
MCV RBC AUTO: 95.6 FL — SIGNIFICANT CHANGE UP (ref 80–100)
MONOCYTES # BLD AUTO: 0.85 K/UL — SIGNIFICANT CHANGE UP (ref 0–0.9)
MONOCYTES # BLD AUTO: 0.9 K/UL — SIGNIFICANT CHANGE UP (ref 0–0.9)
MONOCYTES NFR BLD AUTO: 10.1 % — SIGNIFICANT CHANGE UP (ref 2–14)
MONOCYTES NFR BLD AUTO: 10.8 % — SIGNIFICANT CHANGE UP (ref 2–14)
NEUTROPHILS # BLD AUTO: 5.89 K/UL — SIGNIFICANT CHANGE UP (ref 1.8–7.4)
NEUTROPHILS # BLD AUTO: 6.53 K/UL — SIGNIFICANT CHANGE UP (ref 1.8–7.4)
NEUTROPHILS NFR BLD AUTO: 73.5 % — SIGNIFICANT CHANGE UP (ref 43–77)
NEUTROPHILS NFR BLD AUTO: 74.6 % — SIGNIFICANT CHANGE UP (ref 43–77)
NRBC # BLD: 0 /100 WBCS — SIGNIFICANT CHANGE UP (ref 0–0)
NRBC # BLD: 0 /100 WBCS — SIGNIFICANT CHANGE UP (ref 0–0)
NT-PROBNP SERPL-SCNC: 1147 PG/ML — HIGH (ref 0–300)
NT-PROBNP SERPL-SCNC: 793 PG/ML — HIGH (ref 0–300)
OSMOLALITY SERPL: 278 MOSMOL/KG — LOW (ref 280–301)
PCO2 BLDV: 25 MMHG — LOW (ref 41–51)
PH BLDV: 7.6 — HIGH (ref 7.32–7.43)
PHOSPHATE SERPL-MCNC: 3.2 MG/DL — SIGNIFICANT CHANGE UP (ref 2.5–4.5)
PLATELET # BLD AUTO: 193 K/UL — SIGNIFICANT CHANGE UP (ref 150–400)
PLATELET # BLD AUTO: 290 K/UL — SIGNIFICANT CHANGE UP (ref 150–400)
PO2 BLDV: 163 MMHG — SIGNIFICANT CHANGE UP
POTASSIUM SERPL-MCNC: 3.8 MMOL/L — SIGNIFICANT CHANGE UP (ref 3.5–5.3)
POTASSIUM SERPL-MCNC: 4.5 MMOL/L — SIGNIFICANT CHANGE UP (ref 3.5–5.3)
POTASSIUM SERPL-SCNC: 3.8 MMOL/L — SIGNIFICANT CHANGE UP (ref 3.5–5.3)
POTASSIUM SERPL-SCNC: 4.5 MMOL/L — SIGNIFICANT CHANGE UP (ref 3.5–5.3)
PROCALCITONIN SERPL-MCNC: 0.06 NG/ML — SIGNIFICANT CHANGE UP (ref 0.02–0.1)
PROT SERPL-MCNC: 6.8 G/DL — SIGNIFICANT CHANGE UP (ref 6–8.3)
PROTHROM AB SERPL-ACNC: 16.1 SEC — HIGH (ref 10–12.9)
RAPID RVP RESULT: SIGNIFICANT CHANGE UP
RBC # BLD: 3.38 M/UL — LOW (ref 3.8–5.2)
RBC # BLD: 3.55 M/UL — LOW (ref 3.8–5.2)
RBC # FLD: 14.6 % — HIGH (ref 10.3–14.5)
RBC # FLD: 14.8 % — HIGH (ref 10.3–14.5)
SAO2 % BLDV: 100 % — SIGNIFICANT CHANGE UP
SODIUM SERPL-SCNC: 130 MMOL/L — LOW (ref 135–145)
SODIUM SERPL-SCNC: 133 MMOL/L — LOW (ref 135–145)
TIBC SERPL-MCNC: 313 UG/DL — SIGNIFICANT CHANGE UP (ref 220–430)
TOTAL CHOLESTEROL/HDL RATIO MEASUREMENT: 1.6 RATIO — LOW (ref 3.3–7.1)
TRANSFERRIN SERPL-MCNC: 271 MG/DL — SIGNIFICANT CHANGE UP (ref 200–360)
TRIGL SERPL-MCNC: 69 MG/DL — SIGNIFICANT CHANGE UP (ref 10–149)
TROPONIN T SERPL-MCNC: <0.01 NG/ML — SIGNIFICANT CHANGE UP (ref 0–0.01)
TSH SERPL-MCNC: 2.32 UIU/ML — SIGNIFICANT CHANGE UP (ref 0.35–4.94)
UIBC SERPL-MCNC: 181 UG/DL — SIGNIFICANT CHANGE UP (ref 110–370)
WBC # BLD: 7.89 K/UL — SIGNIFICANT CHANGE UP (ref 3.8–10.5)
WBC # BLD: 8.89 K/UL — SIGNIFICANT CHANGE UP (ref 3.8–10.5)
WBC # FLD AUTO: 7.89 K/UL — SIGNIFICANT CHANGE UP (ref 3.8–10.5)
WBC # FLD AUTO: 8.89 K/UL — SIGNIFICANT CHANGE UP (ref 3.8–10.5)

## 2019-12-11 PROCEDURE — 99285 EMERGENCY DEPT VISIT HI MDM: CPT

## 2019-12-11 PROCEDURE — 71250 CT THORAX DX C-: CPT | Mod: 26

## 2019-12-11 PROCEDURE — 99223 1ST HOSP IP/OBS HIGH 75: CPT | Mod: GC

## 2019-12-11 PROCEDURE — 71045 X-RAY EXAM CHEST 1 VIEW: CPT | Mod: 26

## 2019-12-11 RX ORDER — IPRATROPIUM/ALBUTEROL SULFATE 18-103MCG
3 AEROSOL WITH ADAPTER (GRAM) INHALATION EVERY 6 HOURS
Refills: 0 | Status: DISCONTINUED | OUTPATIENT
Start: 2019-12-11 | End: 2019-12-13

## 2019-12-11 RX ORDER — AMLODIPINE BESYLATE 2.5 MG/1
10 TABLET ORAL DAILY
Refills: 0 | Status: DISCONTINUED | OUTPATIENT
Start: 2019-12-11 | End: 2019-12-13

## 2019-12-11 RX ORDER — ENOXAPARIN SODIUM 100 MG/ML
40 INJECTION SUBCUTANEOUS EVERY 24 HOURS
Refills: 0 | Status: DISCONTINUED | OUTPATIENT
Start: 2019-12-11 | End: 2019-12-13

## 2019-12-11 RX ORDER — ALPRAZOLAM 0.25 MG
0.5 TABLET ORAL AT BEDTIME
Refills: 0 | Status: DISCONTINUED | OUTPATIENT
Start: 2019-12-11 | End: 2019-12-13

## 2019-12-11 RX ORDER — TRAZODONE HCL 50 MG
50 TABLET ORAL AT BEDTIME
Refills: 0 | Status: DISCONTINUED | OUTPATIENT
Start: 2019-12-11 | End: 2019-12-13

## 2019-12-11 RX ORDER — ATORVASTATIN CALCIUM 80 MG/1
20 TABLET, FILM COATED ORAL AT BEDTIME
Refills: 0 | Status: DISCONTINUED | OUTPATIENT
Start: 2019-12-11 | End: 2019-12-13

## 2019-12-11 RX ORDER — PANTOPRAZOLE SODIUM 20 MG/1
40 TABLET, DELAYED RELEASE ORAL
Refills: 0 | Status: DISCONTINUED | OUTPATIENT
Start: 2019-12-12 | End: 2019-12-13

## 2019-12-11 RX ADMIN — Medication 0.5 MILLIGRAM(S): at 21:27

## 2019-12-11 RX ADMIN — AMLODIPINE BESYLATE 10 MILLIGRAM(S): 2.5 TABLET ORAL at 06:22

## 2019-12-11 RX ADMIN — Medication 3 MILLILITER(S): at 11:30

## 2019-12-11 RX ADMIN — Medication 3 MILLILITER(S): at 16:40

## 2019-12-11 RX ADMIN — Medication 50 MILLIGRAM(S): at 21:27

## 2019-12-11 RX ADMIN — ENOXAPARIN SODIUM 40 MILLIGRAM(S): 100 INJECTION SUBCUTANEOUS at 15:30

## 2019-12-11 RX ADMIN — ATORVASTATIN CALCIUM 20 MILLIGRAM(S): 80 TABLET, FILM COATED ORAL at 21:27

## 2019-12-11 RX ADMIN — Medication 3 MILLILITER(S): at 04:07

## 2019-12-11 NOTE — H&P ADULT - ASSESSMENT
85F with a PMH of breast ca, L arm/wrist fracture, R elbow fracture (July 2019) after mechanical fall, ?historian who presents from home with HHA with 2 days of SOB at rest with an inability to lay flat and 1 day of associated non productive cough.

## 2019-12-11 NOTE — PROGRESS NOTE ADULT - ASSESSMENT
85F with a PMH of breast ca, L arm/wrist fracture, R elbow fracture (July 2019) after mechanical fall, ?historian/dementia, who presented from home with HHA with 2 days of SOB at rest, an inability to lay flat, and 1 day of associated non-productive cough, admitted for work-up of SOB; consider atelectasis vs. new onset CHF exacerbation vs. tracheobronchitis vs. atypical CAP.

## 2019-12-11 NOTE — ED PROVIDER NOTE - PHYSICAL EXAMINATION
Constitutional: pleasant, elderly woman, WDWN sitting comfortably in chair; NAD  Head: NC/AT  Eyes: PERRL, EOMI, anicteric sclera  ENT: no nasal discharge; uvula midline, no oropharyngeal erythema or exudates; MMM  Neck: supple; no JVD or thyromegaly  Respiratory: scattered end exp wheeze, speaking in full sentences without increased WOB or accessory muscle use, no hypoxia.	  Cardiac: +S1/S2; RRR; no systolic murmur heard at LEON, ADELE border  Gastrointestinal: soft, NT/ND; no rebound or guarding; +BSx4  Back: spine midline, no bony tenderness or step-offs; no CVAT B/L  Extremities: WWP, 2+ bilateral LE edema  Musculoskeletal: R arm in brace  Vascular: 2+ radial, femoral, DP/PT pulses B/L  Dermatologic: skin warm, dry and intact; no rashes, wounds, or scars  Lymphatic: no submandibular or cervical LAD  Neurologic: AAOx3; CNII-XII grossly intact; no focal deficits  Psychiatric: affect and characteristics of appearance, verbalizations, behaviors are appropriate

## 2019-12-11 NOTE — PHYSICAL THERAPY INITIAL EVALUATION ADULT - THERAPY FREQUENCY, PT EVAL
2-3x/week/Patient and HHA educated on frequency of inpatient therapy at [facility], both verbalized understanding.

## 2019-12-11 NOTE — ED PROVIDER NOTE - OBJECTIVE STATEMENT
85F with a PMH of breast ca, L arm/wrist fracture, R elbow fracture (July 2019) after mechanical fall, ?historian who presents from home with HHA with 2 days of SOB at rest with an inability to lay flat and 1 day of associated non productive cough. Ms. Blum was in her normal state of health when her SOB started.  She usually sleeps with 1 small pillow in bed at home.  She is a patient of Dr. Barry's so presented to his office the day prior to admission to have her SOB evaluated. She was prescribed methylprednisolone dose pack and a Zpack and sent home. Her home health aide states that she took 1 day of medication (1 pill of Azithromycin, 1 day of methylprednisolone (2 pills in AM, 2 pills in PM). Her sx did not improve, and she and her aides were concerned that her SOB had worsened so she called Dr. Barry who directed her to present to [facility] ED. She denies fever, chills, HA, CP, radha/vom, sick contacts or recent travel.

## 2019-12-11 NOTE — PROGRESS NOTE ADULT - PROBLEM SELECTOR PLAN 1
Presented with 2 days of SOB with associated dry cough after speaking to her physician Dr. Barry. Pt took Azithromycin and methylprednisolone (2 pills in AM, 2 pills in PM) but felt that her sx worsened and thus presented to [facility] ED. SOB improved with duonebs in ED. Pt without known COPD hx, thus currently no need for steroids.  - c/w standing duonebs Q 6  - pulmonology consulted (Dr. Doherty), recommendations appreciated   - f/u result of CT chest   - RVP neg;  f/u blood cx drawn 12/11, unlikely infectious etiology   - upper airway wheezing consistent with tracheobronchitis   - patient refused CTPE in ED because thought would interact with her hearing aid, however, d-dimer negative, O2 sat 98%, and not tachypneic, so little concern for PE at this time  - bedside ultrasound showed B lines and BNP elevated, consider new onset CHF; will speak to Dr. Patricio about obtaining out-pt echo report

## 2019-12-11 NOTE — CONSULT NOTE ADULT - SUBJECTIVE AND OBJECTIVE BOX
PULMONARY SERVICE INITIAL CONSULT NOTE    HPI:  Ms. Blum is an 85F with a PMH of breast ca, L arm/wrist fracture, R elbow fracture (July 2019) after mechanical fall, ?historian who presents from home with HHA with 2 days of SOB at rest with an inability to lay flat and 1 day of associated non productive cough. Ms. Blum was in her normal state of health when her SOB started.  She usually sleeps with 1 small pillow in bed at home.  She is a patient of Dr. Barry's so presented to his office the day prior to admission to have her SOB evaluated. She was prescribed methylprednisolone dose pack and a Zpack and sent home. Her home health aide states that she took 1 day of medication (1 pill of Azithromycin, 1 day of methylprednisolone (2 pills in AM, 2 pills in PM). Her sx did not improve, and she and her aides were concerned that her SOB had worsened so she called Dr. Barry who directed her to present to [facility] ED. She denies fever, chills, HA, CP, radha/vom, sick contacts or recent travel.    ED vitals: T: 98.6, HR: 107, BP: 167/77, RR: 18, SpO2: 96% RA  ED labs: Hgb 10.6, PT: 16.1, PTT: 41.6, INR: 1.41, Na: 130, Cl: 93, glucose: 108; trop <0.01, pro ; D dimer: neg  ED imaging: CXR with RLL haziness    EKG: sinus tach, L axis deviation, ST depressions laterally (unchanged from EKG July 2019 or other prior EKGs)  ED meds: duonebs x2  Blood cx sent, RVP neg (11 Dec 2019 04:07)    REVIEW OF SYSTEMS:  Constitutional: No fever, weight loss or fatigue  Eyes: No eye pain, visual disturbances, or discharge  ENMT:  No difficulty hearing, tinnitus, vertigo; No sinus or throat pain  Neck: No pain, stiffness or neck swelling  Respiratory: see HPI  Cardiovascular: No chest pain, palpitations, dizziness or leg swelling  Gastrointestinal: No abdominal or epigastric pain. No nausea, vomiting or hematemesis; No diarrhea or constipation. No melena or hematochezia.  Genitourinary: No dysuria, frequency, hematuria or incontinence  Neurological: No headaches, memory loss, loss of strength, numbness or tremors  Skin: No itching, burning, rashes or lesions   Lymph Nodes: No enlarged glands  Endocrine: No heat or cold intolerance; No hair loss  Musculoskeletal: No joint pain or swelling; No muscle, back or extremity pain  Psychiatric: No depression, anxiety, mood swings or difficulty sleeping  Heme/Lymph: No easy bruising or bleeding gums  Allergy and Immunologic: No hives or eczema    PAST MEDICAL & SURGICAL HISTORY:  Breast cancer  History of lymph node excision: in axilla, around left breast 40 years ago  Bilateral bunions    FAMILY HISTORY:  Known health problems: none    SOCIAL HISTORY:  Smoking Status: [ ] Current, [X] Former, [ ] Never  Pack Years: unable to quantify - she used to smoke "socially" for 10 years but stopped completely about 30-40yrs ago     MEDICATIONS:  Pulmonary:  albuterol/ipratropium for Nebulization 3 milliLiter(s) Nebulizer every 6 hours    Antimicrobials:    Anticoagulants:    Onc:    GI/:  aluminum hydroxide/magnesium hydroxide/simethicone Suspension 30 milliLiter(s) Oral every 4 hours PRN    Endocrine:  atorvastatin 20 milliGRAM(s) Oral at bedtime    Cardiac:  amLODIPine   Tablet 10 milliGRAM(s) Oral daily    Other Medications:  ALPRAZolam 0.5 milliGRAM(s) Oral at bedtime  traZODone 50 milliGRAM(s) Oral at bedtime    Allergies    No Known Allergies    Intolerances    Vital Signs Last 24 Hrs  T(C): 36.8 (11 Dec 2019 09:49), Max: 37 (10 Dec 2019 23:54)  T(F): 98.2 (11 Dec 2019 09:49), Max: 98.6 (10 Dec 2019 23:54)  HR: 90 (11 Dec 2019 09:49) (90 - 107)  BP: 157/70 (11 Dec 2019 09:49) (150/78 - 167/77)  BP(mean): --  RR: 18 (11 Dec 2019 09:49) (18 - 20)  SpO2: 98% (11 Dec 2019 09:49) (96% - 98%)    PHYSICAL EXAM:  Constitutional: WDWN elderly woman sitting in chair; NAD  Head: NC/AT  EENT: PERRL, anicteric sclera; oropharynx clear, MMM  Neck: supple, no appreciable JVD  Respiratory: end-expiratory wheezing in posterior upper lung fields with dry non-productive cough on deep inspiration not appearing in respiratory distress  Cardiovascular: +S1/S2, RRR; III/VI GOMEZ LUSB  Gastrointestinal: soft, NT/ND; +BSx4  Extremities: WWP; no edema, clubbing or cyanosis  Vascular: 2+ radial, DP/PT pulses B/L  Neurological: AAOx3; no focal deficits    LABS:  CBC Full  -  ( 11 Dec 2019 06:29 )  WBC Count : 7.89 K/uL  RBC Count : 3.38 M/uL  Hemoglobin : 10.3 g/dL  Hematocrit : 32.3 %  Platelet Count - Automated : 193 K/uL  Mean Cell Volume : 95.6 fl  Mean Cell Hemoglobin : 30.5 pg  Mean Cell Hemoglobin Concentration : 31.9 gm/dL  Auto Neutrophil # : 5.89 K/uL  Auto Lymphocyte # : 1.02 K/uL  Auto Monocyte # : 0.85 K/uL  Auto Eosinophil # : 0.05 K/uL  Auto Basophil # : 0.06 K/uL  Auto Neutrophil % : 74.6 %  Auto Lymphocyte % : 12.9 %  Auto Monocyte % : 10.8 %  Auto Eosinophil % : 0.6 %  Auto Basophil % : 0.8 %    12-11    133<L>  |  97  |  15  ----------------------------<  125<H>  3.8   |  21<L>  |  0.71    Ca    9.4      11 Dec 2019 06:29  Phos  3.2     12-11  Mg     2.1     12-11    TPro  6.8  /  Alb  4.0  /  TBili  0.2  /  DBili  x   /  AST  25  /  ALT  15  /  AlkPhos  110  12-11    PT/INR - ( 11 Dec 2019 01:05 )   PT: 16.1 sec;   INR: 1.41          PTT - ( 11 Dec 2019 01:05 )  PTT:41.6 sec    RADIOLOGY & ADDITIONAL STUDIES:  Reviewed CXR personally

## 2019-12-11 NOTE — CONSULT NOTE ADULT - ATTENDING COMMENTS
History consistent with cardiac etiology of wheezing and cough/SOB. Unclear what happened when she had one dose of Azithromycin with a quick decompensation; sounds like an allergic reaction but she has taken Azithromycin numerous times before so doubtful. Would monitor off antibiotics and pursue a cardiac workup. CT chest reviewed and she has a 17mm ground glass nodule in the RLL; former smoker; less than 30 pack years; quit 40 years ago; needs short term follow up; other nodules noted in the Right lung were less than 6 mm and do not require follow up. Can consider PPI and nasal steroid for chronic etiologies of cough such as GERD and UACS, respectively but doubt these are the main culprits in her case. Will follow.

## 2019-12-11 NOTE — PHYSICAL THERAPY INITIAL EVALUATION ADULT - ADDITIONAL COMMENTS
Patient reports previously independent with all ADLs/IADLs however fall in July 2019 resulted in fractured (R)UE requiring increased assist for ADLs/IADLs however has 24/7 HHA to assist patient as needed. Denies falls since July 2019.

## 2019-12-11 NOTE — CONSULT NOTE ADULT - PROBLEM SELECTOR RECOMMENDATION 2
Seen on bedside POCUS as well as CT    Recommendation:  - encourage mobility/ambulation  - incentive spirometry while in bed

## 2019-12-11 NOTE — ED PROVIDER NOTE - CLINICAL SUMMARY MEDICAL DECISION MAKING FREE TEXT BOX
85F with a PMH of breast ca, L arm/wrist fracture, R elbow fracture (July 2019) after mechanical fall, ?historian who presents from home with HHA with 2 days of SOB at rest with an inability to lay flat and 1 day of associated non productive cough. ddx copd vs chf, plans for labs and CXR, pt given duoneb with some improvement. Admit to dr. mae for further management.

## 2019-12-11 NOTE — CONSULT NOTE ADULT - ASSESSMENT
86yo F w/ PMH breast CA presenting with reported sinusitis 4 days ago which progressed over the last 2 days to SOB/orthopnea a/w non-productive cough.

## 2019-12-11 NOTE — PHYSICAL THERAPY INITIAL EVALUATION ADULT - PLANNED THERAPY INTERVENTIONS, PT EVAL
transfer training/endurance training/bed mobility training/balance training/gait training/strengthening

## 2019-12-11 NOTE — H&P ADULT - PROBLEM SELECTOR PLAN 2
Pt with Na 130 without changes in mental status. Euvolemic on exam.  - f/u urine osm, urine Na, serum osm

## 2019-12-11 NOTE — PHYSICAL THERAPY INITIAL EVALUATION ADULT - PERTINENT HX OF CURRENT PROBLEM, REHAB EVAL
Ms. Blum is an 85F with a PMH of breast ca, L arm/wrist fracture, R elbow fracture (July 2019) after mechanical fall, ?historian who presents from home with HHA with 2 days of SOB at rest with an inability to lay flat and 1 day of associated non productive cough. Ms. Blum was in her normal state of health when her SOB started.  She usually sleeps with 1 small pillow in bed at home. Please refer to H&P on [place] for remaining.

## 2019-12-11 NOTE — H&P ADULT - NSHPLABSRESULTS_GEN_ALL_CORE
.  LABS:                         10.6   8.89  )-----------( 290      ( 11 Dec 2019 01:05 )             32.7     12-11    130<L>  |  93<L>  |  17  ----------------------------<  108<H>  4.5   |  22  |  0.73    Ca    9.1      11 Dec 2019 01:05    TPro  6.8  /  Alb  4.0  /  TBili  0.2  /  DBili  x   /  AST  25  /  ALT  15  /  AlkPhos  110  12-11    PT/INR - ( 11 Dec 2019 01:05 )   PT: 16.1 sec;   INR: 1.41          PTT - ( 11 Dec 2019 01:05 )  PTT:41.6 sec    CARDIAC MARKERS ( 11 Dec 2019 01:05 )  x     / <0.01 ng/mL / 82 U/L / x     / 1.7 ng/mL      Serum Pro-Brain Natriuretic Peptide: 793 pg/mL (12-11 @ 01:05)    Lactate, Blood: 1.3 mmoL/L (12-11 @ 01:05)      RADIOLOGY, EKG & ADDITIONAL TESTS: Reviewed.

## 2019-12-11 NOTE — H&P ADULT - HISTORY OF PRESENT ILLNESS
Ms. Blum is an 85F with a PMH of breast ca, L arm/wrist fracture, R elbow fracture (July 2019), ?historian who presents from home with HHA with 2 days of SOB at rest with an inability to lay flat and 1 day of associated non productive cough. Ms. Blum was in her normal state of health when her SOB started.  She usually sleeps with 1 small pillow in bed at home.  She is a patient of Dr. Barry's so presented to his office the day prior to admission to have her SOB evaluated. She was prescribed methylprednisolone pack and a Zpack and sent home. Her home health aide states that she took 1 day of medication (1 pill of Azithromycin, 1 day of methylprednisolone (2 pills in AM, 2 pills in PM). Her sx did not improve, and she and her aides were concerned that her SOB had worsened so she called Dr. Barry who directed her to present to [facility] ED. She denies fever, chills, HA, CP, radha/vom, sick contacts or recent travel.    ED vitals: T: 98.6, HR: 107, BP: 167/77, RR: 18, SpO2: 96% RA  ED labs: Na: 130,   ED imaging:  EKG: sinus tachy, L axis deviation, p wave inversion, ST depressions laterally  ED meds: Ms. Blum is an 85F with a PMH of breast ca, L arm/wrist fracture, R elbow fracture (July 2019) after mechanical fall, ?historian who presents from home with HHA with 2 days of SOB at rest with an inability to lay flat and 1 day of associated non productive cough. Ms. Blum was in her normal state of health when her SOB started.  She usually sleeps with 1 small pillow in bed at home.  She is a patient of Dr. Barry's so presented to his office the day prior to admission to have her SOB evaluated. She was prescribed methylprednisolone dose pack and a Zpack and sent home. Her home health aide states that she took 1 day of medication (1 pill of Azithromycin, 1 day of methylprednisolone (2 pills in AM, 2 pills in PM). Her sx did not improve, and she and her aides were concerned that her SOB had worsened so she called Dr. Barry who directed her to present to [facility] ED. She denies fever, chills, HA, CP, radha/vom, sick contacts or recent travel.    ED vitals: T: 98.6, HR: 107, BP: 167/77, RR: 18, SpO2: 96% RA  ED labs: Hgb 10.6, PT: 16.1, PTT: 41.6, INR: 1.41, Na: 130, Cl: 93, glucose: 108; trop <0.01, pro ; D dimer: neg  ED imaging: CXR with RLL haziness    EKG: sinus tach, L axis deviation, ST depressions laterally (unchanged from EKG July 2019 or other prior EKGs)  ED meds: duonebs x2  Blood cx sent, RVP neg

## 2019-12-11 NOTE — PROGRESS NOTE ADULT - SUBJECTIVE AND OBJECTIVE BOX
OVERNIGHT EVENTS:  Patient admitted overnight.     SUBJECTIVE / INTERVAL HPI:   Patient seen and examined at bedside. Patient denies nausea, vomiting, diarrhea, constipation, fever, chills, night sweats, chest pain, palpitations, dysuria, or increased urinary frequency. Complains of cough producing a clear sputum, wheezing, and still feeling SOB, though states this has improved in the time she has been in the ED after being given duonebs. Denies any sore throat, rhinorrhea, or other cold like symptoms. Has no other complaints at this time. Endorses LE edema b/l but says she often has this at baseline, is not sure it is worse than normal.       VITAL SIGNS:  Vital Signs Last 24 Hrs  T(C): 37 (11 Dec 2019 13:36), Max: 37 (10 Dec 2019 23:54)  T(F): 98.6 (11 Dec 2019 13:36), Max: 98.6 (10 Dec 2019 23:54)  HR: 96 (11 Dec 2019 13:36) (90 - 107)  BP: 135/65 (11 Dec 2019 13:36) (135/65 - 167/77)  BP(mean): --  RR: 16 (11 Dec 2019 13:36) (16 - 20)  SpO2: 97% (11 Dec 2019 13:36) (96% - 98%)      PHYSICAL EXAM:  General: WDWN, elderly female, sitting up in bed in NAD but mildly SOB with talking  HEENT: NC/AT; PERRL, anicteric sclera; MMM  Neck: supple, no JVD  Cardiovascular: +S1/S2, RRR, +aortic outflow murmur best heard in 2nd ICS,  Respiratory: CTA B/L, no crackles or rhonchi, +wheezing appreciated but localized to upper airway not lung bases, +saturating well on RA  Gastrointestinal: soft, NT/ND; +BSx4  Extremities: WWP; +1 lower extremity edema b/l, clubbing or cyanosis  Vascular: 2+ radial, DP/PT pulses B/L  Neurological: AAOx3; no focal deficits, CN2-12 grossly intact, normal sensation, UE and LE b/l WNL    MEDICATIONS:  MEDICATIONS  (STANDING):  albuterol/ipratropium for Nebulization 3 milliLiter(s) Nebulizer every 6 hours  ALPRAZolam 0.5 milliGRAM(s) Oral at bedtime  amLODIPine   Tablet 10 milliGRAM(s) Oral daily  atorvastatin 20 milliGRAM(s) Oral at bedtime  enoxaparin Injectable 40 milliGRAM(s) SubCutaneous daily  traZODone 50 milliGRAM(s) Oral at bedtime    MEDICATIONS  (PRN):  aluminum hydroxide/magnesium hydroxide/simethicone Suspension 30 milliLiter(s) Oral every 4 hours PRN Dyspepsia      ALLERGIES:  Allergies    No Known Allergies    Intolerances        LABS:                        10.3   7.89  )-----------( 193      ( 11 Dec 2019 06:29 )             32.3     12-11    133<L>  |  97  |  15  ----------------------------<  125<H>  3.8   |  21<L>  |  0.71    Ca    9.4      11 Dec 2019 06:29  Phos  3.2     12-11  Mg     2.1     12-11    TPro  6.8  /  Alb  4.0  /  TBili  0.2  /  DBili  x   /  AST  25  /  ALT  15  /  AlkPhos  110  12-11    PT/INR - ( 11 Dec 2019 01:05 )   PT: 16.1 sec;   INR: 1.41          PTT - ( 11 Dec 2019 01:05 )  PTT:41.6 sec    CAPILLARY BLOOD GLUCOSE            RADIOLOGY & ADDITIONAL TESTS:  Reviewed.  Chest x-ray shows atelectasis vs. RLL haziness  EKG shows lateral ST depressions, consistent with prior EKG in July 2019

## 2019-12-11 NOTE — PHYSICAL THERAPY INITIAL EVALUATION ADULT - GENERAL OBSERVATIONS, REHAB EVAL
Chart reviewed. IE Completed. Patient without complaints of pain at rest, agreeable to PT. Patient received semi-supine, NAD, +tele, +IV hep lock, +(R)UE brace donned (chronic fracture from July, as per HARMONY Hurley patient is now WBAT), ROCIO Canchola cleared patient for treatment. Chart reviewed. IE Completed. Patient without complaints of pain at rest, agreeable to PT. Patient received OOB in chair, NAD, +tele, +IV hep lock, +(R)UE brace donned (chronic fracture from July, as per HARMONY Hurley patient is now WBAT), ROCIO Canchola cleared patient for treatment.

## 2019-12-11 NOTE — PROGRESS NOTE ADULT - PROBLEM SELECTOR PLAN 7
1) PCP Contacted on Admission: (Y/N) --> Name & Phone #: Dr. Robert Barry   2) Date of Contact with PCP: 12/11/2019   3) PCP Contacted at Discharge: (Y/N)  4) Summary of Handoff Given to PCP:   5) Post-Discharge Appointment Date and Location:

## 2019-12-11 NOTE — H&P ADULT - NSHPSOCIALHISTORY_GEN_ALL_CORE
Former "social" smoker, smoked for approximately 10 years; quit 30 years ago. Drinks 1 glass of wine daily. No drug use. Lives alone with 24 HHA.

## 2019-12-11 NOTE — H&P ADULT - NSHPPHYSICALEXAM_GEN_ALL_CORE
.  VITAL SIGNS:  T(C): 36.8 (12-11-19 @ 01:05), Max: 37 (12-10-19 @ 23:54)  T(F): 98.2 (12-11-19 @ 01:05), Max: 98.6 (12-10-19 @ 23:54)  HR: 102 (12-11-19 @ 01:05) (102 - 107)  BP: 150/78 (12-11-19 @ 01:05) (150/78 - 167/77)  BP(mean): --  RR: 20 (12-11-19 @ 01:05) (18 - 20)  SpO2: 98% (12-11-19 @ 01:05) (96% - 98%)  Wt(kg): --    PHYSICAL EXAM:    Constitutional: pleasant, elderly woman, WDWN sitting comfortably in chair; NAD  Head: NC/AT  Eyes: PERRL, EOMI, anicteric sclera  ENT: no nasal discharge; uvula midline, no oropharyngeal erythema or exudates; MMM  Neck: supple; no JVD or thyromegaly  Respiratory: prolonged expiratory wheeze throughout lung fields, speaking in full sentences without increased WOB or accessory muscle use  Cardiac: +S1/S2; RRR; no systolic murmur heard at LEON, ADELE border  Gastrointestinal: soft, NT/ND; no rebound or guarding; +BSx4  Back: spine midline, no bony tenderness or step-offs; no CVAT B/L  Extremities: WWP, 2+ bilateral LE edema  Musculoskeletal: R arm in brace  Vascular: 2+ radial, femoral, DP/PT pulses B/L  Dermatologic: skin warm, dry and intact; no rashes, wounds, or scars  Lymphatic: no submandibular or cervical LAD  Neurologic: AAOx3; CNII-XII grossly intact; no focal deficits  Psychiatric: affect and characteristics of appearance, verbalizations, behaviors are appropriate

## 2019-12-11 NOTE — PROGRESS NOTE ADULT - SUBJECTIVE AND OBJECTIVE BOX
HPI: Kulwant is an 85F with a PMH of breast ca, L arm/wrist fracture, R elbow fracture (July 2019) after mechanical fall, ?historian who presents from home with HHA with 2 days of SOB at rest with an inability to lay flat and 1 day of associated non productive cough. Ms. Blum was in her normal state of health when her SOB started.  She usually sleeps with 1 small pillow in bed at home.  She is a patient of Dr. Barry's so presented to his office the day prior to admission to have her SOB evaluated. She was prescribed methylprednisolone dose pack and a Zpack and sent home. Her home health aide states that she took 1 day of medication (1 pill of Azithromycin, 1 day of methylprednisolone (2 pills in AM, 2 pills in PM). Her sx did not improve, and she and her aides were concerned that her SOB had worsened so she called Dr. Barry who directed her to present to [facility] ED. She denies fever, chills, HA, CP, radha/vom, sick contacts or recent travel.    ED vitals: T: 98.6, HR: 107, BP: 167/77, RR: 18, SpO2: 96% RA  ED labs: Hgb 10.6, PT: 16.1, PTT: 41.6, INR: 1.41, Na: 130, Cl: 93, glucose: 108; trop <0.01, pro ; D dimer: neg  ED imaging: CXR with RLL haziness    EKG: sinus tach, L axis deviation, ST depressions laterally (unchanged from EKG July 2019 or other prior EKGs)  ED meds: duonebs x2  Blood cx sent, RVP neg (11 Dec 2019 04:07)    PAST MEDICAL & SURGICAL HISTORY:  Breast cancer  History of lymph node excision: in axilla, around left breast 40 years ago  Bilateral bunions    MEDICATIONS  (STANDING):  albuterol/ipratropium for Nebulization 3 milliLiter(s) Nebulizer every 6 hours  ALPRAZolam 0.5 milliGRAM(s) Oral at bedtime  amLODIPine   Tablet 10 milliGRAM(s) Oral daily  atorvastatin 20 milliGRAM(s) Oral at bedtime  enoxaparin Injectable 40 milliGRAM(s) SubCutaneous every 24 hours  traZODone 50 milliGRAM(s) Oral at bedtime    MEDICATIONS  (PRN):  aluminum hydroxide/magnesium hydroxide/simethicone Suspension 30 milliLiter(s) Oral every 4 hours PRN Dyspepsia    Vital Signs Last 24 Hrs  T(C): 37.1 (11 Dec 2019 17:38), Max: 37.1 (11 Dec 2019 17:38)  T(F): 98.7 (11 Dec 2019 17:38), Max: 98.7 (11 Dec 2019 17:38)  HR: 96 (11 Dec 2019 16:09) (90 - 107)  BP: 155/64 (11 Dec 2019 16:09) (135/65 - 167/77)  BP(mean): 96 (11 Dec 2019 16:09) (96 - 96)  RR: 18 (11 Dec 2019 16:09) (16 - 20)  SpO2: 97% (11 Dec 2019 16:09) (96% - 98%)    NAD  No JVD  Chest wheezing with RLL Rhonchi  CV Sinus Tach II/VI GOMEZ LSB  Abd soft  Ext no edema  Neuro non-focal                          10.3   7.89  )-----------( 193      ( 11 Dec 2019 06:29 )             32.3   12-11    133<L>  |  97  |  15  ----------------------------<  125<H>  3.8   |  21<L>  |  0.71    Ca    9.4      11 Dec 2019 06:29  Phos  3.2     12-11  Mg     2.1     12-11    TPro  6.8  /  Alb  4.0  /  TBili  0.2  /  DBili  x   /  AST  25  /  ALT  15  /  AlkPhos  110  12-11    CT Chest  Multiple scattered subsolid pulmonary nodules with a dominant   subsolid/groundglass nodule within the right lower lobe, which meets   criteria for six-month follow-up to optimally document long-term 5 year   stability.

## 2019-12-11 NOTE — H&P ADULT - PROBLEM SELECTOR PLAN 7
1) PCP Contacted on Admission: (Y/N) --> Name & Phone #: Dr. Barry  2) Date of Contact with PCP:  3) PCP Contacted at Discharge: (Y/N)  4) Summary of Handoff Given to PCP:   5) Post-Discharge Appointment Date and Location: 1) PCP Contacted on Admission: (Y/N) --> Name & Phone #: Dr. Robert Barry   2) Date of Contact with PCP: 12/11/19 did not answer phone; left message  3) PCP Contacted at Discharge: (Y/N)  4) Summary of Handoff Given to PCP:   5) Post-Discharge Appointment Date and Location:

## 2019-12-11 NOTE — PROGRESS NOTE ADULT - PROBLEM SELECTOR PLAN 6
F: none  E: replete electrolytes K>4, Mg >2  N: DASH/TLC diet  DVT PPx: Lovenox  Code status: Full Code

## 2019-12-11 NOTE — PROGRESS NOTE ADULT - PROBLEM SELECTOR PLAN 5
Pt with R arm fracture (July 2019) currently in R arm brace.    #Medication reconciliation needed:  Pt on rivaroxaban 10 mg without clear hx of DVT/PE, describes that it was almost prophylactically given after surgery on fractures. Pt on 450 mg Buproprion.  - will reach out to pharmacy to verify patient's home meds

## 2019-12-11 NOTE — PROGRESS NOTE ADULT - PROBLEM SELECTOR PLAN 3
Hgb 10.6 today. Baseline Hgb unclear as chart review shows Hgb 8- 10. Pt without signs of active bleeding.  - f/u iron studies

## 2019-12-11 NOTE — PROGRESS NOTE ADULT - ASSESSMENT
Hemodynamically stable  -tchycardia secondary to nebs?  echocardiogram  CT chest RLLL ground glass - Dr Javed to see patient ?bronchitis  Remains Wheezey  DVT prophylaxis  Blood pressure controlled

## 2019-12-11 NOTE — H&P ADULT - PROBLEM SELECTOR PLAN 3
Hgb 10.6 today. Baseline Hgb unclear as chart review shows Hgb 8- 10. Pt without signs of active bleeding.  -f/u iron studies

## 2019-12-11 NOTE — H&P ADULT - PROBLEM SELECTOR PLAN 5
Pt with R arm fracture (July 2019) currently in R arm brace.    #Medication reconcilliation needed  Pt on rivaroxaban 10 mg without clear hx of DVT/PE. Pt on 450 mg Buproprion.  - Call pharmacy/speak to call Dr. Barry before restarting home meds.

## 2019-12-11 NOTE — H&P ADULT - PROBLEM SELECTOR PLAN 1
Pt presents with 2 days of SOB with associated dry cough after speaking to her physician Dr. Barry. Pt took 1 pill of Azithromycin, 1 day of methylprednisolone (2 pills in AM, 2 pills in PM) but felt that her sx worsened and thus presented to [facility] ED. -SOB improved with duonebs in ED. Pt without known COPD hx, thus currently no need for steroids.  - c/w standing duonebs Q 6  - RVP neg  - f/u blood cx drawn 12/11

## 2019-12-11 NOTE — PHYSICAL THERAPY INITIAL EVALUATION ADULT - GAIT DISTANCE, PT EVAL
~150 feet x 1 (***Ambulated on room air with increased shallow breathing noted, SpO2 reading on pulse: 75% however upon returning to room, immediately re-applied pulse-ox and monitor reading 95-96%; patient noted with nail-polish)

## 2019-12-11 NOTE — CONSULT NOTE ADULT - PROBLEM SELECTOR RECOMMENDATION 9
Etiology of her respiratory sx are vague and unclear - though in the absence of pre-existing lung disease, and her recent reported acute sinusitis there is a possibility she had progression to acute bronchitis which can cause wheezing and feeling of SOB. Other possibilities include vocal cord dysfxn (particularly with her underlying anxiety/depression), or adverse reaction to azithromycin or even steroids (less likely with methylprednisolone however, and she reports having tolerated azithromycin in the past).     Recommendations:  - requested non-con chest CT earlier in day (performed at time of note writing) to better elucidate pulmonary nodules and evaluate underlying lung parenchyma  - would start PPI empirically which can help if she has having reflux chemical pneumonitis   - consider trial of nasal steroids such as flonase  - can monitor off steroid and abx as most causative organisms for acute bronchitis are viral even though RVP neg  - c/w bronchodilators as needed for wheezing

## 2019-12-11 NOTE — CONSULT NOTE ADULT - PROBLEM SELECTOR RECOMMENDATION 3
Will need interval follow-up CT scan performed in 6mos on an outpatient basis and can follow-up with primary care provider vs. pulmonary for this purpose.

## 2019-12-11 NOTE — PHYSICAL THERAPY INITIAL EVALUATION ADULT - GAIT DEVIATIONS NOTED, PT EVAL
decreased tay/decreased step length/slightly unsteady gait however no LOB/knee buckling noted; fair negotiation through dynamic hallway obstacles without gait disturbances noted

## 2019-12-12 ENCOUNTER — TRANSCRIPTION ENCOUNTER (OUTPATIENT)
Age: 84
End: 2019-12-12

## 2019-12-12 DIAGNOSIS — I51.9 HEART DISEASE, UNSPECIFIED: ICD-10-CM

## 2019-12-12 DIAGNOSIS — K21.9 GASTRO-ESOPHAGEAL REFLUX DISEASE WITHOUT ESOPHAGITIS: ICD-10-CM

## 2019-12-12 LAB
-  COAGULASE NEGATIVE STAPHYLOCOCCUS: SIGNIFICANT CHANGE UP
ANION GAP SERPL CALC-SCNC: 12 MMOL/L — SIGNIFICANT CHANGE UP (ref 5–17)
BUN SERPL-MCNC: 12 MG/DL — SIGNIFICANT CHANGE UP (ref 7–23)
CALCIUM SERPL-MCNC: 8.9 MG/DL — SIGNIFICANT CHANGE UP (ref 8.4–10.5)
CHLORIDE SERPL-SCNC: 102 MMOL/L — SIGNIFICANT CHANGE UP (ref 96–108)
CO2 SERPL-SCNC: 26 MMOL/L — SIGNIFICANT CHANGE UP (ref 22–31)
CREAT SERPL-MCNC: 0.71 MG/DL — SIGNIFICANT CHANGE UP (ref 0.5–1.3)
GLUCOSE SERPL-MCNC: 107 MG/DL — HIGH (ref 70–99)
GRAM STN FLD: SIGNIFICANT CHANGE UP
HCT VFR BLD CALC: 32.5 % — LOW (ref 34.5–45)
HGB BLD-MCNC: 10.2 G/DL — LOW (ref 11.5–15.5)
MAGNESIUM SERPL-MCNC: 2.1 MG/DL — SIGNIFICANT CHANGE UP (ref 1.6–2.6)
MCHC RBC-ENTMCNC: 30.3 PG — SIGNIFICANT CHANGE UP (ref 27–34)
MCHC RBC-ENTMCNC: 31.4 GM/DL — LOW (ref 32–36)
MCV RBC AUTO: 96.4 FL — SIGNIFICANT CHANGE UP (ref 80–100)
METHOD TYPE: SIGNIFICANT CHANGE UP
NRBC # BLD: 0 /100 WBCS — SIGNIFICANT CHANGE UP (ref 0–0)
PHOSPHATE SERPL-MCNC: 4.3 MG/DL — SIGNIFICANT CHANGE UP (ref 2.5–4.5)
PLATELET # BLD AUTO: 245 K/UL — SIGNIFICANT CHANGE UP (ref 150–400)
POTASSIUM SERPL-MCNC: 3.8 MMOL/L — SIGNIFICANT CHANGE UP (ref 3.5–5.3)
POTASSIUM SERPL-SCNC: 3.8 MMOL/L — SIGNIFICANT CHANGE UP (ref 3.5–5.3)
PROCALCITONIN SERPL-MCNC: 0.08 NG/ML — SIGNIFICANT CHANGE UP (ref 0.02–0.1)
RBC # BLD: 3.37 M/UL — LOW (ref 3.8–5.2)
RBC # FLD: 14.9 % — HIGH (ref 10.3–14.5)
SODIUM SERPL-SCNC: 140 MMOL/L — SIGNIFICANT CHANGE UP (ref 135–145)
WBC # BLD: 6.27 K/UL — SIGNIFICANT CHANGE UP (ref 3.8–10.5)
WBC # FLD AUTO: 6.27 K/UL — SIGNIFICANT CHANGE UP (ref 3.8–10.5)

## 2019-12-12 PROCEDURE — 93306 TTE W/DOPPLER COMPLETE: CPT | Mod: 26

## 2019-12-12 PROCEDURE — 99233 SBSQ HOSP IP/OBS HIGH 50: CPT | Mod: GC

## 2019-12-12 RX ORDER — METOPROLOL TARTRATE 50 MG
25 TABLET ORAL DAILY
Refills: 0 | Status: DISCONTINUED | OUTPATIENT
Start: 2019-12-13 | End: 2019-12-13

## 2019-12-12 RX ORDER — FLUTICASONE PROPIONATE 50 MCG
1 SPRAY, SUSPENSION NASAL
Qty: 1 | Refills: 0
Start: 2019-12-12

## 2019-12-12 RX ORDER — NEBIVOLOL HYDROCHLORIDE 5 MG/1
1 TABLET ORAL
Qty: 0 | Refills: 0 | DISCHARGE

## 2019-12-12 RX ORDER — PANTOPRAZOLE SODIUM 20 MG/1
1 TABLET, DELAYED RELEASE ORAL
Qty: 60 | Refills: 0
Start: 2019-12-12 | End: 2020-02-09

## 2019-12-12 RX ORDER — ESOMEPRAZOLE MAGNESIUM 40 MG/1
1 CAPSULE, DELAYED RELEASE ORAL
Qty: 0 | Refills: 0 | DISCHARGE

## 2019-12-12 RX ORDER — FLUTICASONE PROPIONATE 50 MCG
1 SPRAY, SUSPENSION NASAL
Qty: 0 | Refills: 0 | DISCHARGE
Start: 2019-12-12

## 2019-12-12 RX ORDER — POTASSIUM CHLORIDE 20 MEQ
20 PACKET (EA) ORAL ONCE
Refills: 0 | Status: COMPLETED | OUTPATIENT
Start: 2019-12-12 | End: 2019-12-12

## 2019-12-12 RX ORDER — ROSUVASTATIN CALCIUM 5 MG/1
1 TABLET ORAL
Qty: 0 | Refills: 0 | DISCHARGE

## 2019-12-12 RX ORDER — ATORVASTATIN CALCIUM 80 MG/1
1 TABLET, FILM COATED ORAL
Qty: 60 | Refills: 0
Start: 2019-12-12 | End: 2020-02-09

## 2019-12-12 RX ORDER — BUPROPION HYDROCHLORIDE 150 MG/1
1 TABLET, EXTENDED RELEASE ORAL
Qty: 0 | Refills: 0 | DISCHARGE

## 2019-12-12 RX ORDER — FLUTICASONE PROPIONATE 50 MCG
1 SPRAY, SUSPENSION NASAL
Refills: 0 | Status: DISCONTINUED | OUTPATIENT
Start: 2019-12-12 | End: 2019-12-13

## 2019-12-12 RX ORDER — PANTOPRAZOLE SODIUM 20 MG/1
1 TABLET, DELAYED RELEASE ORAL
Qty: 0 | Refills: 0 | DISCHARGE
Start: 2019-12-12

## 2019-12-12 RX ADMIN — ATORVASTATIN CALCIUM 20 MILLIGRAM(S): 80 TABLET, FILM COATED ORAL at 21:15

## 2019-12-12 RX ADMIN — AMLODIPINE BESYLATE 10 MILLIGRAM(S): 2.5 TABLET ORAL at 06:25

## 2019-12-12 RX ADMIN — Medication 0.5 MILLIGRAM(S): at 21:15

## 2019-12-12 RX ADMIN — ENOXAPARIN SODIUM 40 MILLIGRAM(S): 100 INJECTION SUBCUTANEOUS at 17:04

## 2019-12-12 RX ADMIN — Medication 20 MILLIEQUIVALENT(S): at 13:02

## 2019-12-12 RX ADMIN — Medication 1 SPRAY(S): at 17:04

## 2019-12-12 RX ADMIN — PANTOPRAZOLE SODIUM 40 MILLIGRAM(S): 20 TABLET, DELAYED RELEASE ORAL at 06:25

## 2019-12-12 RX ADMIN — Medication 50 MILLIGRAM(S): at 21:15

## 2019-12-12 NOTE — PROGRESS NOTE ADULT - ATTENDING COMMENTS
History consistent with cardiac etiology of wheezing and cough/SOB. TTE with diastolic dysfunction, TR and moderate AS; all can lead to her symptoms. Diuresis improved her symptoms. Also now with GPC in blood culture; would cover empirically until speciated. CT chest reviewed and she has a 17mm ground glass nodule in the RLL; former smoker; less than 30 pack years; quit 40 years ago; needs short term follow up; other nodules noted in the Right lung were less than 6 mm and do not require follow up. Follow up with pulmonary as outpatient. Call with questions. History consistent with cardiac etiology of wheezing and cough/SOB. TTE with diastolic dysfunction, TR and moderate AS; all can lead to her symptoms. Diuresis improved her symptoms. CT chest reviewed and she has a 17mm ground glass nodule in the RLL; former smoker; less than 30 pack years; quit 40 years ago; needs short term follow up; other nodules noted in the Right lung were less than 6 mm and do not require follow up. Follow up with pulmonary as outpatient. Call with questions.

## 2019-12-12 NOTE — DIETITIAN INITIAL EVALUATION ADULT. - ADD RECOMMEND
1. Monitor PO intake/appetite, GI distress, diet tolerance, labs, weights.  2. Honor pt food preferences as able.   Will make pt RSA. Suggest communication order for PCA to be present during meals to ensure adequate assistance provided. 3. RD to remain available for additional nutrition interventions as needed.

## 2019-12-12 NOTE — PROGRESS NOTE ADULT - ASSESSMENT
85F with a PMH of breast ca, L arm/wrist fracture, R elbow fracture (July 2019) after mechanical fall, ?historian/dementia, who presented from home with HHA with 2 days of SOB at rest, an inability to lay flat, and 1 day of associated non-productive cough, admitted for work-up of SOB, likely 2/2 to chemical pneumonitis in the setting of acid reflux.

## 2019-12-12 NOTE — PROGRESS NOTE ADULT - PROBLEM SELECTOR PLAN 1
IMPROVING -- etiology of her respiratory sx are vague and unclear - though in the absence of pre-existing lung disease, and her recent reported acute sinusitis there is a possibility she had progression to acute bronchitis which can cause wheezing and feeling of SOB. Other possibilities include vocal cord dysfxn (particularly with her underlying anxiety/depression), or adverse reaction to azithromycin or even steroids (less likely with methylprednisolone however, and she reports having tolerated azithromycin in the past).     Recommendations:  - f/u echo today to evaluate cardiac etiologies for her sx  - c/w trial of nasal steroids and PPI  - can monitor off steroid and abx as most causative organisms for acute bronchitis are viral even though RVP neg  - c/w bronchodilators as needed for wheezing

## 2019-12-12 NOTE — PROGRESS NOTE ADULT - PROBLEM SELECTOR PLAN 6
Pt with R arm fracture (July 2019) currently in R arm brace.    #Medication reconciliation needed:  Pt on rivaroxaban 10 mg without clear hx of DVT/PE, describes that it was almost prophylactically given after surgery on fractures. Pt on 450 mg Buproprion.  - med rec completed, pt filled 450 mg Buproprion in November

## 2019-12-12 NOTE — PROGRESS NOTE ADULT - SUBJECTIVE AND OBJECTIVE BOX
HPI:  Ms. Blum is an 85F with a PMH of breast ca, L arm/wrist fracture, R elbow fracture (July 2019) after mechanical fall, ?historian who presents from home with HHA with 2 days of SOB at rest with an inability to lay flat and 1 day of associated non productive cough. Ms. Blum was in her normal state of health when her SOB started.  She usually sleeps with 1 small pillow in bed at home.  She is a patient of Dr. Barry's so presented to his office the day prior to admission to have her SOB evaluated. She was prescribed methylprednisolone dose pack and a Zpack and sent home. Her home health aide states that she took 1 day of medication (1 pill of Azithromycin, 1 day of methylprednisolone (2 pills in AM, 2 pills in PM). Her sx did not improve, and she and her aides were concerned that her SOB had worsened so she called Dr. Barry who directed her to present to [facility] ED. She denies fever, chills, HA, CP, radha/vom, sick contacts or recent travel.    ED vitals: T: 98.6, HR: 107, BP: 167/77, RR: 18, SpO2: 96% RA  ED labs: Hgb 10.6, PT: 16.1, PTT: 41.6, INR: 1.41, Na: 130, Cl: 93, glucose: 108; trop <0.01, pro ; D dimer: neg  ED imaging: CXR with RLL haziness    EKG: sinus tach, L axis deviation, ST depressions laterally (unchanged from EKG July 2019 or other prior EKGs)  ED meds: duonebs x2  Blood cx sent, RVP neg (11 Dec 2019 04:07)      Overnight she slept well     PAST MEDICAL & SURGICAL HISTORY:  Breast cancer  History of lymph node excision: in axilla, around left breast 40 years ago  Bilateral bunions    MEDICATIONS  (STANDING):  albuterol/ipratropium for Nebulization 3 milliLiter(s) Nebulizer every 6 hours  ALPRAZolam 0.5 milliGRAM(s) Oral at bedtime  amLODIPine   Tablet 10 milliGRAM(s) Oral daily  atorvastatin 20 milliGRAM(s) Oral at bedtime  enoxaparin Injectable 40 milliGRAM(s) SubCutaneous every 24 hours  fluticasone propionate 50 MICROgram(s)/spray Nasal Spray 1 Spray(s) Both Nostrils two times a day  pantoprazole    Tablet 40 milliGRAM(s) Oral before breakfast  traZODone 50 milliGRAM(s) Oral at bedtime    MEDICATIONS  (PRN):  aluminum hydroxide/magnesium hydroxide/simethicone Suspension 30 milliLiter(s) Oral every 4 hours PRN Dyspepsia    Vital Signs Last 24 Hrs  T(C): 36.7 (12 Dec 2019 05:00), Max: 37.1 (11 Dec 2019 17:38)  T(F): 98 (12 Dec 2019 05:00), Max: 98.7 (11 Dec 2019 17:38)  HR: 87 (12 Dec 2019 05:20) (87 - 96)  BP: 128/71 (12 Dec 2019 05:20) (110/53 - 157/70)  BP(mean): 93 (12 Dec 2019 05:20) (75 - 96)  RR: 18 (12 Dec 2019 05:20) (16 - 18)  SpO2: 99% (12 Dec 2019 05:20) (97% - 99%)    NAD  No JVD  Chest minimal intermittent wheezing  CV RRR s1s2 II/VI GOMEZ LSB  Abd soft  Ext trace edema                          10.3   7.89  )-----------( 193      ( 11 Dec 2019 06:29 )             32.3   12-11    133<L>  |  97  |  15  ----------------------------<  125<H>  3.8   |  21<L>  |  0.71    Ca    9.4      11 Dec 2019 06:29  Phos  3.2     12-11  Mg     2.1     12-11    TPro  6.8  /  Alb  4.0  /  TBili  0.2  /  DBili  x   /  AST  25  /  ALT  15  /  AlkPhos  110  12-11

## 2019-12-12 NOTE — PROGRESS NOTE ADULT - PROBLEM SELECTOR PLAN 3
17mm ground-glass nodule in RLL -- will need interval follow-up CT scan performed in 6mos on an outpatient basis and can follow-up with primary care provider vs. pulmonary for this purpose.

## 2019-12-12 NOTE — PROGRESS NOTE ADULT - SUBJECTIVE AND OBJECTIVE BOX
PULMONARY CONSULT SERVICE FOLLOW-UP NOTE    INTERVAL HPI:  Reviewed chart and overnight events; patient seen and examined at bedside.    MEDICATIONS:  Pulmonary:  albuterol/ipratropium for Nebulization 3 milliLiter(s) Nebulizer every 6 hours    Antimicrobials:    Anticoagulants:  enoxaparin Injectable 40 milliGRAM(s) SubCutaneous every 24 hours    Cardiac:  amLODIPine   Tablet 10 milliGRAM(s) Oral daily    Allergies    No Known Allergies    Intolerances    Vital Signs Last 24 Hrs  T(C): 36.8 (12 Dec 2019 10:00), Max: 37.1 (11 Dec 2019 17:38)  T(F): 98.3 (12 Dec 2019 10:00), Max: 98.7 (11 Dec 2019 17:38)  HR: 82 (12 Dec 2019 10:17) (82 - 96)  BP: 152/63 (12 Dec 2019 10:17) (110/53 - 155/64)  BP(mean): 93 (12 Dec 2019 05:20) (75 - 96)  RR: 18 (12 Dec 2019 05:20) (16 - 18)  SpO2: 99% (12 Dec 2019 05:20) (97% - 99%)    PHYSICAL EXAM:  Constitutional: WDWN elderly woman sitting in chair; NAD  Head: NC/AT  EENT: PERRL, anicteric sclera; oropharynx clear, MMM  Neck: supple, no appreciable JVD  Respiratory: end-expiratory wheezing in posterior upper lung fields with dry non-productive cough on deep inspiration not appearing in respiratory distress  Cardiovascular: +S1/S2, RRR; III/VI GOMEZ LUSB  Gastrointestinal: soft, NT/ND; +BSx4  Extremities: WWP; no edema, clubbing or cyanosis  Vascular: 2+ radial pulses B/L  Neurological: awake and alert; HARRIS    LABS:  CBC Full  -  ( 12 Dec 2019 10:19 )  WBC Count : 6.27 K/uL  RBC Count : 3.37 M/uL  Hemoglobin : 10.2 g/dL  Hematocrit : 32.5 %  Platelet Count - Automated : 245 K/uL  Mean Cell Volume : 96.4 fl  Mean Cell Hemoglobin : 30.3 pg  Mean Cell Hemoglobin Concentration : 31.4 gm/dL  Auto Neutrophil # : x  Auto Lymphocyte # : x  Auto Monocyte # : x  Auto Eosinophil # : x  Auto Basophil # : x  Auto Neutrophil % : x  Auto Lymphocyte % : x  Auto Monocyte % : x  Auto Eosinophil % : x  Auto Basophil % : x    12-12    x   |  102  |  12  ----------------------------<  107<H>  3.8   |  26  |  0.71    Ca    8.9      12 Dec 2019 10:19  Phos  4.3     12-12  Mg     2.1     12-12    TPro  6.8  /  Alb  4.0  /  TBili  0.2  /  DBili  x   /  AST  25  /  ALT  15  /  AlkPhos  110  12-11    PT/INR - ( 11 Dec 2019 01:05 )   PT: 16.1 sec;   INR: 1.41          PTT - ( 11 Dec 2019 01:05 )  PTT:41.6 sec    RADIOLOGY & ADDITIONAL STUDIES:  Personally reviewed

## 2019-12-12 NOTE — PROGRESS NOTE ADULT - PROBLEM SELECTOR PLAN 2
Echocardiogram performed today.   - showed grade II diastolic dysfunction   - EF estimated to be 65-70%  - started on Toprol 25 mg PO daily

## 2019-12-12 NOTE — PROGRESS NOTE ADULT - ASSESSMENT
Hemodynamically stable  D/W Dr. Javed who feels she has bronchitis and would benefit from a course of levaquin and inhalers  Would like to consider dc home today pending ambulation  DVT proph in place  Labs stable  DC Planning

## 2019-12-12 NOTE — DISCHARGE NOTE PROVIDER - HOSPITAL COURSE
Hospital Course:     Patient is an 85F with PMH of breast cancer, L arm/wrist fracture, R elbow fracture (July 2019) after mechanical fall, ?historian/sancho, who presented from home with HHA with SOB x 2 days at rest, orthopnea, and non-productive cough after being given z-pack and steroids out-patient with no improvement in respiratory status, admitted for possible CHF exacerbation. In ED, vital signs were stable and patient was saturating fine on RA. Patient was seen by pulmonology who recommended starting a PPI and fluticasone nasal spray, given that shortness of breath and upper airway reason might be due to airway irration or chemical pneumonitis 2/2 to GERD. CT chest was performed which showed multiple scattered solid pulmonary nodules with solid ground glass nodules in the right lower lob that must be followed up. with repeat imaging in 6 months. Echocardiogram was done which showed __________________. Patient continued to improve with duonebs and never required any escalation of supplemental O2. Patient currently reports that she is breathing much better. Her wheezing has resolved and she can now lay down without becoming short of breath. Patient is medically optimized for discharge to home and must follow-up with Dr. Barry out-patient after discharge from the hospital. Patient will continue taking PPI and fluticasone nasal spray at home. Hospital Course:     Patient is an 85F with PMH of breast cancer, L arm/wrist fracture, R elbow fracture (July 2019) after mechanical fall, ?historian/sancho, who presented from home with HHA with SOB x 2 days at rest, orthopnea, and non-productive cough after being given z-pack and steroids out-patient with no improvement in respiratory status, admitted for possible CHF exacerbation. In ED, vital signs were stable and patient was saturating fine on RA. Patient was seen by pulmonology who recommended starting a PPI and fluticasone nasal spray, given that shortness of breath and upper airway reason might be due to airway irration or chemical pneumonitis 2/2 to GERD. CT chest was performed which showed multiple scattered solid pulmonary nodules with solid ground glass nodules in the right lower lob that must be followed up. with repeat imaging in 6 months. Echocardiogram was done which showed LVEF 65-70% with grade II diastolic dysfunction and mild-moderate concentric LVH. Patient continued to improve with duonebs and never required any escalation of supplemental O2. Patient currently reports that she is breathing much better. Her wheezing has resolved and she can now lay down without becoming short of breath. Patient is medically optimized for discharge to home and must follow-up with Dr. Barry out-patient after discharge from the hospital. Patient will continue taking PPI and fluticasone nasal spray at home. Hospital Course:     Patient is an 85F with PMH of breast cancer, L arm/wrist fracture, R elbow fracture (July 2019) after mechanical fall, ?dementia, who presented from home with HHA with SOB x 2 days at rest, orthopnea, and non-productive cough after being given z-pack and steroids out-patient with no improvement in respiratory status, admitted for possible CHF exacerbation. In ED, vital signs were stable and patient was saturating fine on RA. Patient was seen by pulmonology who recommended starting a PPI and fluticasone nasal spray, given that shortness of breath and upper airway reason might be due to airway irration or chemical pneumonitis 2/2 to GERD. CT chest was performed which showed multiple scattered solid pulmonary nodules with solid ground glass nodules in the right lower lob that must be followed up. with repeat imaging in 6 months. Echocardiogram was done which showed LVEF 65-70% with grade II diastolic dysfunction and mild-moderate concentric LVH. Patient continued to improve with duonebs and never required any escalation of supplemental O2. Patient currently reports that she is breathing much better. Her wheezing has resolved and she can now lay down without becoming short of breath. Patient is medically optimized for discharge to home and must follow-up with Dr. Barry out-patient after discharge from the hospital. Patient will continue taking PPI and fluticasone nasal spray at home.

## 2019-12-12 NOTE — PROGRESS NOTE ADULT - SUBJECTIVE AND OBJECTIVE BOX
OVERNIGHT EVENTS:  No events overnight.     SUBJECTIVE / INTERVAL HPI:   Patient seen and examined at bedside. States that her breathing is better. Patient denies nausea, vomiting, diarrhea, constipation, fever, chills, night sweats, shortness of breath, cough, wheezing, chest pain, palpitations, dysuria, or increased urinary frequency.       VITAL SIGNS:  Vital Signs Last 24 Hrs  T(C): 36.8 (12 Dec 2019 10:00), Max: 37.1 (11 Dec 2019 17:38)  T(F): 98.3 (12 Dec 2019 10:00), Max: 98.7 (11 Dec 2019 17:38)  HR: 78 (12 Dec 2019 13:32) (78 - 96)  BP: 112/65 (12 Dec 2019 13:32) (110/53 - 152/63)  BP(mean): 93 (12 Dec 2019 05:20) (75 - 93)  RR: 18 (12 Dec 2019 05:20) (18 - 18)  SpO2: 99% (12 Dec 2019 05:20) (99% - 99%)      PHYSICAL EXAM:  General: WDWN, +elderly female lying in bed   HEENT: NC/AT; PERRL, anicteric sclera; MMM  Neck: supple, no JVD   Cardiovascular: +S1/S2; RRR, +aortic murmur heard in 2nd R ICS   Respiratory: CTA B/L; no W/R/R  Gastrointestinal: soft, NT/ND; +BSx4  Extremities: WWP; +1 LE edema b/l, clubbing or cyanosis  Vascular: 2+ radial, DP/PT pulses B/L  Neurological: AAOx3; no focal deficits, CN2-12 grossly intact, sensation intact    MEDICATIONS:  MEDICATIONS  (STANDING):  albuterol/ipratropium for Nebulization 3 milliLiter(s) Nebulizer every 6 hours  ALPRAZolam 0.5 milliGRAM(s) Oral at bedtime  amLODIPine   Tablet 10 milliGRAM(s) Oral daily  atorvastatin 20 milliGRAM(s) Oral at bedtime  enoxaparin Injectable 40 milliGRAM(s) SubCutaneous every 24 hours  fluticasone propionate 50 MICROgram(s)/spray Nasal Spray 1 Spray(s) Both Nostrils two times a day  pantoprazole    Tablet 40 milliGRAM(s) Oral before breakfast  traZODone 50 milliGRAM(s) Oral at bedtime    MEDICATIONS  (PRN):  aluminum hydroxide/magnesium hydroxide/simethicone Suspension 30 milliLiter(s) Oral every 4 hours PRN Dyspepsia      ALLERGIES:  Allergies    No Known Allergies    Intolerances        LABS:                        10.2   6.27  )-----------( 245      ( 12 Dec 2019 10:19 )             32.5     12-12    140  |  102  |  12  ----------------------------<  107<H>  3.8   |  26  |  0.71    Ca    8.9      12 Dec 2019 10:19  Phos  4.3     12-12  Mg     2.1     12-12    TPro  6.8  /  Alb  4.0  /  TBili  0.2  /  DBili  x   /  AST  25  /  ALT  15  /  AlkPhos  110  12-11    PT/INR - ( 11 Dec 2019 01:05 )   PT: 16.1 sec;   INR: 1.41          PTT - ( 11 Dec 2019 01:05 )  PTT:41.6 sec    CAPILLARY BLOOD GLUCOSE            RADIOLOGY & ADDITIONAL TESTS:   Reviewed.

## 2019-12-12 NOTE — PROGRESS NOTE ADULT - PROBLEM SELECTOR PLAN 5
PMH of breast CA. States that she has not needed treatment for it in years.    #GERD:   - c/w 40 mg pantoprazole PO daily

## 2019-12-12 NOTE — PROGRESS NOTE ADULT - PROBLEM SELECTOR PLAN 4
Hgb 10.6 today. Baseline Hgb unclear as chart review shows Hgb 8- 10. Pt without signs of active bleeding.  - iron studies all WNL

## 2019-12-12 NOTE — DIETITIAN INITIAL EVALUATION ADULT. - OTHER INFO
85F with a PMH of breast ca, L arm/wrist fracture, Bilateral bunions, R elbow fracture (July 2019) after mechanical fall, ?historian who presents from home with HHA with 2 days of SOB - consider atelectasis vs. new onset CHF exacerbation vs. tracheobronchitis vs. atypical CAP. ECHO pending. No pain per flow sheets. 2+ edema (left foot;  right foot). No pressure ulcers. BM+ (12/10).   Attended IDR (12/12). DASH TLC diet (12/11). 85F with a PMH of breast ca, L arm/wrist fracture, Bilateral bunions, R elbow fracture (July 2019) after mechanical fall, ?historian who presents from home with HHA with 2 days of SOB - consider atelectasis vs. new onset CHF exacerbation vs. tracheobronchitis vs. atypical CAP. ECHO pending. No pain per flow sheets. 2+ edema (left foot;  right foot). No pressure ulcers. BM+ (12/10).   Attended IDR (12/12). Spoke with PCA. Pt visited, declined RD visit 2/2 wanting sleep, attempted to see pt again however off unit, able to speak with aide/caregiver at bedside however had limited info to provide. Reports PO intake will vary PTA - denies issues chewing/swallowing, however reports pt needs soft/cut up foods 2/2 issues cutting and self feeding difficulty. Denies food allergies. Current diet order DASH TLC diet (12/11), unclear if pt consumed breakfast today 2/2 wanting to sleep. UBW unknown; RD note July 2019 notes wt of 153 pounds, current body wt of 138 pounds (12/11) would suggest wt loss x5 months of 15 pounds / 9.7% body wt loss.   Please see below for nutritions recommendations. Pending order placed. Recs made with team.

## 2019-12-12 NOTE — DIETITIAN INITIAL EVALUATION ADULT. - PROBLEM SELECTOR PLAN 7
1) PCP Contacted on Admission: (Y/N) --> Name & Phone #: Dr. Robert Barry   2) Date of Contact with PCP: 12/11/19 did not answer phone; left message  3) PCP Contacted at Discharge: (Y/N)  4) Summary of Handoff Given to PCP:   5) Post-Discharge Appointment Date and Location:

## 2019-12-12 NOTE — DISCHARGE NOTE PROVIDER - CARE PROVIDER_API CALL
Robert Barry)  Cardiovascular Medicine  [address] [place] [address]  Phone: [phone]  Fax: [phone]  Follow Up Time:

## 2019-12-12 NOTE — PROGRESS NOTE ADULT - PROBLEM SELECTOR PLAN 1
Presented with 2 days of SOB with associated dry cough after speaking to her physician Dr. Barry. Pt took Azithromycin and methylprednisolone (2 pills in AM, 2 pills in PM) but felt that her sx worsened and thus presented to [facility] ED. SOB improved with duonebs in ED. Pt without known COPD hx, thus currently no need for steroids.  - c/w standing duonebs q6  - pulmonology consulted (Dr. Doherty), recommendations appreciated   - CT chest showed ground glass nodule in RLL, recommended repeat imaging in 6 months out-patient - noted in DC summary   - RVP neg; unlikely infectious etiology   - upper airway wheezing resolved, no wheezing noted on exam   - continue with pantoprazole and fluticasone nasal spray to treat possible chemical pneumonitis secondary to acid reflux/GERD

## 2019-12-12 NOTE — DISCHARGE NOTE PROVIDER - NSDCFUADDAPPT_GEN_ALL_CORE_FT
1. Please follow-up with Dr. Barry on Friday, January 10th at 3PM. For an earlier appointment, please try calling his office at [phone].

## 2019-12-12 NOTE — DIETITIAN INITIAL EVALUATION ADULT. - DIET TYPE
DASH (vs low sodium Pending order placed. %PO intake) + Mech Soft Diet + Ensure pudding x3 as oral nutrition supplements (170kcal/4gm prot per 1 can)

## 2019-12-12 NOTE — DIETITIAN INITIAL EVALUATION ADULT. - ENERGY NEEDS
Height:  5 feet 3 inches, Weight (Current): 138 pounds,  pounds +/-10%, %IBW %120, BMI 24.5  current body wt used for energy calculations as pt falls within % IBW; adjusted for age + possible wt loss, fluids per team 2/2 possible CHF

## 2019-12-12 NOTE — DISCHARGE NOTE PROVIDER - NSDCMRMEDTOKEN_GEN_ALL_CORE_FT
acetaminophen 325 mg oral tablet: 2 tab(s) orally every 6 hours, As needed, Moderate Pain (4 - 6)  acetaminophen 325 mg oral tablet: 2 tab(s) orally every 6 hours, As needed, Temp greater or equal to 38C (100.4F), Mild Pain (1 - 3)  amLODIPine 10 mg oral tablet: 1 tab(s) orally once a day  atorvastatin 20 mg oral tablet: 1 tab(s) orally once a day (at bedtime)  fluticasone 50 mcg/inh nasal spray: 1 spray(s) nasal 2 times a day  pantoprazole 40 mg oral delayed release tablet: 1 tab(s) orally once a day (before a meal)  traZODone 50 mg oral tablet: orally once a day (at bedtime)  Xanax 0.5 mg oral tablet: orally once a day (at bedtime)  Xarelto 10 mg oral tablet: 1 tab(s) orally once a day acetaminophen 325 mg oral tablet: 2 tab(s) orally every 6 hours, As needed, Moderate Pain (4 - 6)  acetaminophen 325 mg oral tablet: 2 tab(s) orally every 6 hours, As needed, Temp greater or equal to 38C (100.4F), Mild Pain (1 - 3)  amLODIPine 10 mg oral tablet: 1 tab(s) orally once a day  atorvastatin 20 mg oral tablet: 1 tab(s) orally once a day (at bedtime)  fluticasone 50 mcg/inh nasal spray: 1 spray(s) nasal 2 times a day  metoprolol succinate 25 mg oral tablet, extended release: 1 tab(s) orally once a day  pantoprazole 40 mg oral delayed release tablet: 1 tab(s) orally once a day (before a meal)  traZODone 50 mg oral tablet: orally once a day (at bedtime)  Xanax 0.5 mg oral tablet: orally once a day (at bedtime)  Xarelto 10 mg oral tablet: 1 tab(s) orally once a day

## 2019-12-12 NOTE — DISCHARGE NOTE PROVIDER - NSDCCPCAREPLAN_GEN_ALL_CORE_FT
PRINCIPAL DISCHARGE DIAGNOSIS  Diagnosis: Shortness of breath  Assessment and Plan of Treatment: You were admitted for shortness of breath, which was treated with duonebs and fluticasone nasal spray. Overtime, your upper airway wheezing improved. You maintained good O2 saturation on RA and CT chest showed solid pulmonary nodules. Please continue taking the PPI and steroid nasal spray at home that we prescribed as we think your shortness of breath was caused by a pneumonitis that was caused by acid reflux. Please follow-up with Dr. Barry and return to the ED should you become significantly short of breath.      SECONDARY DISCHARGE DIAGNOSES  Diagnosis: HLD (hyperlipidemia)  Assessment and Plan of Treatment: You have elevated cholesterol. Please continue taking 20 mg atorvastatin at home, which has been prescribed.    Diagnosis: Pulmonary nodules  Assessment and Plan of Treatment: Pulmonary nodules were identified on your CT chest. Please discuss this finiding with Dr. Barry. Please follow-up with repeat imaging in 6 months to assess if these nodules have changed over time.    Diagnosis: Acid reflux  Assessment and Plan of Treatment: You have acid reflux that may have irritated your airway causing a chemical pneumonitis. Please continue taking the PPI (pantoprazole) we have prescribed for you to prevent further exacerbations.    Diagnosis: Atelectasis  Assessment and Plan of Treatment: Your shortness of breath could be due to atelectasis, upper airway irritation, or bronchiectiasis. RVP was done and negative. Please continue taking your PPI and fluticasone nasal spray. Use of incentive spirometry is encouraged as is out of bed to chair.

## 2019-12-13 ENCOUNTER — TRANSCRIPTION ENCOUNTER (OUTPATIENT)
Age: 84
End: 2019-12-13

## 2019-12-13 VITALS — TEMPERATURE: 98 F

## 2019-12-13 PROCEDURE — 84466 ASSAY OF TRANSFERRIN: CPT

## 2019-12-13 PROCEDURE — 87798 DETECT AGENT NOS DNA AMP: CPT

## 2019-12-13 PROCEDURE — 36415 COLL VENOUS BLD VENIPUNCTURE: CPT

## 2019-12-13 PROCEDURE — 99285 EMERGENCY DEPT VISIT HI MDM: CPT | Mod: 25

## 2019-12-13 PROCEDURE — 83605 ASSAY OF LACTIC ACID: CPT

## 2019-12-13 PROCEDURE — 94640 AIRWAY INHALATION TREATMENT: CPT

## 2019-12-13 PROCEDURE — 84145 PROCALCITONIN (PCT): CPT

## 2019-12-13 PROCEDURE — 83880 ASSAY OF NATRIURETIC PEPTIDE: CPT

## 2019-12-13 PROCEDURE — 82728 ASSAY OF FERRITIN: CPT

## 2019-12-13 PROCEDURE — 87186 SC STD MICRODIL/AGAR DIL: CPT

## 2019-12-13 PROCEDURE — 71045 X-RAY EXAM CHEST 1 VIEW: CPT

## 2019-12-13 PROCEDURE — 84100 ASSAY OF PHOSPHORUS: CPT

## 2019-12-13 PROCEDURE — 80053 COMPREHEN METABOLIC PANEL: CPT

## 2019-12-13 PROCEDURE — 85610 PROTHROMBIN TIME: CPT

## 2019-12-13 PROCEDURE — 87581 M.PNEUMON DNA AMP PROBE: CPT

## 2019-12-13 PROCEDURE — 82550 ASSAY OF CK (CPK): CPT

## 2019-12-13 PROCEDURE — 93306 TTE W/DOPPLER COMPLETE: CPT

## 2019-12-13 PROCEDURE — 80061 LIPID PANEL: CPT

## 2019-12-13 PROCEDURE — 83036 HEMOGLOBIN GLYCOSYLATED A1C: CPT

## 2019-12-13 PROCEDURE — 87633 RESP VIRUS 12-25 TARGETS: CPT

## 2019-12-13 PROCEDURE — 83735 ASSAY OF MAGNESIUM: CPT

## 2019-12-13 PROCEDURE — 83540 ASSAY OF IRON: CPT

## 2019-12-13 PROCEDURE — 83930 ASSAY OF BLOOD OSMOLALITY: CPT

## 2019-12-13 PROCEDURE — 80048 BASIC METABOLIC PNL TOTAL CA: CPT

## 2019-12-13 PROCEDURE — 87486 CHLMYD PNEUM DNA AMP PROBE: CPT

## 2019-12-13 PROCEDURE — 85379 FIBRIN DEGRADATION QUANT: CPT

## 2019-12-13 PROCEDURE — 82803 BLOOD GASES ANY COMBINATION: CPT

## 2019-12-13 PROCEDURE — 84443 ASSAY THYROID STIM HORMONE: CPT

## 2019-12-13 PROCEDURE — 82553 CREATINE MB FRACTION: CPT

## 2019-12-13 PROCEDURE — 83550 IRON BINDING TEST: CPT

## 2019-12-13 PROCEDURE — 85730 THROMBOPLASTIN TIME PARTIAL: CPT

## 2019-12-13 PROCEDURE — 87150 DNA/RNA AMPLIFIED PROBE: CPT

## 2019-12-13 PROCEDURE — 84484 ASSAY OF TROPONIN QUANT: CPT

## 2019-12-13 PROCEDURE — 97161 PT EVAL LOW COMPLEX 20 MIN: CPT

## 2019-12-13 PROCEDURE — 87040 BLOOD CULTURE FOR BACTERIA: CPT

## 2019-12-13 PROCEDURE — 85025 COMPLETE CBC W/AUTO DIFF WBC: CPT

## 2019-12-13 PROCEDURE — 71250 CT THORAX DX C-: CPT

## 2019-12-13 PROCEDURE — 85027 COMPLETE CBC AUTOMATED: CPT

## 2019-12-13 RX ORDER — METOPROLOL TARTRATE 50 MG
1 TABLET ORAL
Qty: 14 | Refills: 0
Start: 2019-12-13 | End: 2019-12-26

## 2019-12-13 RX ADMIN — Medication 3 MILLILITER(S): at 10:57

## 2019-12-13 RX ADMIN — AMLODIPINE BESYLATE 10 MILLIGRAM(S): 2.5 TABLET ORAL at 09:15

## 2019-12-13 RX ADMIN — Medication 25 MILLIGRAM(S): at 09:15

## 2019-12-13 RX ADMIN — PANTOPRAZOLE SODIUM 40 MILLIGRAM(S): 20 TABLET, DELAYED RELEASE ORAL at 09:15

## 2019-12-13 NOTE — PROGRESS NOTE ADULT - SUBJECTIVE AND OBJECTIVE BOX
Overnight she did well until 4am when she felt acutely SOB and requested oxygen - unclear if she was desaturating as there is no documentation  Otherwise she reported sleeping well and currently no SOB    PAST MEDICAL & SURGICAL HISTORY:  Breast cancer  History of lymph node excision: in axilla, around left breast 40 years ago  Bilateral bunions    MEDICATIONS  (STANDING):  albuterol/ipratropium for Nebulization 3 milliLiter(s) Nebulizer every 6 hours  ALPRAZolam 0.5 milliGRAM(s) Oral at bedtime  amLODIPine   Tablet 10 milliGRAM(s) Oral daily  atorvastatin 20 milliGRAM(s) Oral at bedtime  enoxaparin Injectable 40 milliGRAM(s) SubCutaneous every 24 hours  fluticasone propionate 50 MICROgram(s)/spray Nasal Spray 1 Spray(s) Both Nostrils two times a day  metoprolol succinate ER 25 milliGRAM(s) Oral daily  pantoprazole    Tablet 40 milliGRAM(s) Oral before breakfast  traZODone 50 milliGRAM(s) Oral at bedtime    MEDICATIONS  (PRN):  aluminum hydroxide/magnesium hydroxide/simethicone Suspension 30 milliLiter(s) Oral every 4 hours PRN Dyspepsia    Vital Signs Last 24 Hrs  T(C): 36.8 (13 Dec 2019 05:00), Max: 37.1 (12 Dec 2019 18:00)  T(F): 98.3 (13 Dec 2019 05:00), Max: 98.7 (12 Dec 2019 18:00)  HR: 77 (13 Dec 2019 00:15) (73 - 82)  BP: 131/68 (13 Dec 2019 00:15) (112/65 - 152/63)  BP(mean): 106 (13 Dec 2019 00:15) (72 - 106)  RR: 18 (13 Dec 2019 00:15) (18 - 18)  SpO2: 98% (13 Dec 2019 00:15) (98% - 98%)    NAD  No JVD  Chest clear  CV RRR s1s2 III/VI GOMEZ LSB  Abd soft  Ext no edema                          10.2   6.27  )-----------( 245      ( 12 Dec 2019 10:19 )             32.5   12-12    140  |  102  |  12  ----------------------------<  107<H>  3.8   |  26  |  0.71    Ca    8.9      12 Dec 2019 10:19  Phos  4.3     12-12  Mg     2.1     12-12    Echocardiogram CONCLUSIONS:     1. Moderate aortic stenosis.   2. Mild mitral regurgitation.   3. Mild-to-moderate tricuspid regurgitation.   4. Normal left and right ventricular size and function.   5. No pericardial effusion.

## 2019-12-13 NOTE — PROGRESS NOTE ADULT - ASSESSMENT
Hemodynamically stable community acquired bronchitis with moderate AS and normal LVEF  -clinically improved on nebs  -Dr. Javed's consult appreciated  -will plan on dc home today  -starting beta-blocker  -DVT proph while in hosptial  -follow up with me next week

## 2019-12-13 NOTE — DISCHARGE NOTE NURSING/CASE MANAGEMENT/SOCIAL WORK - PATIENT PORTAL LINK FT
You can access the FollowMyHealth Patient Portal offered by [facility] by registering at the following website: [email or web address]. By joining [facility]’s FollowMyHealth portal, you will also be able to view your health information using other applications (apps) compatible with our system.

## 2019-12-13 NOTE — DISCHARGE NOTE NURSING/CASE MANAGEMENT/SOCIAL WORK - NSDCFUADDAPPT_GEN_ALL_CORE_FT
1. Please follow-up with Dr. Barry on Friday, January 10th at 3PM. For an earlier appointment, please try calling his office at [phone].

## 2019-12-14 LAB
-  CEFAZOLIN: SIGNIFICANT CHANGE UP
-  CLINDAMYCIN: SIGNIFICANT CHANGE UP
-  ERYTHROMYCIN: SIGNIFICANT CHANGE UP
-  LINEZOLID: SIGNIFICANT CHANGE UP
-  OXACILLIN: SIGNIFICANT CHANGE UP
-  PENICILLIN: SIGNIFICANT CHANGE UP
-  RIFAMPIN: SIGNIFICANT CHANGE UP
-  TRIMETHOPRIM/SULFAMETHOXAZOLE: SIGNIFICANT CHANGE UP
-  VANCOMYCIN: SIGNIFICANT CHANGE UP
METHOD TYPE: SIGNIFICANT CHANGE UP
METHOD TYPE: SIGNIFICANT CHANGE UP

## 2019-12-16 LAB
CULTURE RESULTS: SIGNIFICANT CHANGE UP
SPECIMEN SOURCE: SIGNIFICANT CHANGE UP

## 2019-12-17 DIAGNOSIS — Z87.891 PERSONAL HISTORY OF NICOTINE DEPENDENCE: ICD-10-CM

## 2019-12-17 DIAGNOSIS — I36.1 NONRHEUMATIC TRICUSPID (VALVE) INSUFFICIENCY: ICD-10-CM

## 2019-12-17 DIAGNOSIS — Z85.3 PERSONAL HISTORY OF MALIGNANT NEOPLASM OF BREAST: ICD-10-CM

## 2019-12-17 DIAGNOSIS — T59.94XA TOXIC EFFECT OF UNSPECIFIED GASES, FUMES AND VAPORS, UNDETERMINED, INITIAL ENCOUNTER: ICD-10-CM

## 2019-12-17 DIAGNOSIS — E87.1 HYPO-OSMOLALITY AND HYPONATREMIA: ICD-10-CM

## 2019-12-17 DIAGNOSIS — I34.0 NONRHEUMATIC MITRAL (VALVE) INSUFFICIENCY: ICD-10-CM

## 2019-12-17 DIAGNOSIS — R91.8 OTHER NONSPECIFIC ABNORMAL FINDING OF LUNG FIELD: ICD-10-CM

## 2019-12-17 DIAGNOSIS — J98.11 ATELECTASIS: ICD-10-CM

## 2019-12-17 DIAGNOSIS — K21.9 GASTRO-ESOPHAGEAL REFLUX DISEASE WITHOUT ESOPHAGITIS: ICD-10-CM

## 2019-12-17 DIAGNOSIS — S42.301A UNSPECIFIED FRACTURE OF SHAFT OF HUMERUS, RIGHT ARM, INITIAL ENCOUNTER FOR CLOSED FRACTURE: ICD-10-CM

## 2019-12-17 DIAGNOSIS — J20.9 ACUTE BRONCHITIS, UNSPECIFIED: ICD-10-CM

## 2019-12-17 DIAGNOSIS — J68.0 BRONCHITIS AND PNEUMONITIS DUE TO CHEMICALS, GASES, FUMES AND VAPORS: ICD-10-CM

## 2019-12-17 DIAGNOSIS — R06.02 SHORTNESS OF BREATH: ICD-10-CM
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
## 2019-12-17 DIAGNOSIS — I51.9 HEART DISEASE, UNSPECIFIED: ICD-10-CM

## 2019-12-17 DIAGNOSIS — I35.0 NONRHEUMATIC AORTIC (VALVE) STENOSIS: ICD-10-CM

## 2019-12-24 LAB
CULTURE RESULTS: SIGNIFICANT CHANGE UP
ORGANISM # SPEC MICROSCOPIC CNT: SIGNIFICANT CHANGE UP
SPECIMEN SOURCE: SIGNIFICANT CHANGE UP

## 2020-01-23 NOTE — ED ADULT NURSE NOTE - NS ED NOTE ABUSE RESPONSE YN
1740: TRANSFER - IN REPORT:    Verbal report received from [facility]) on Brigida Theodore  being received from Cath Lab(unit) for routine progression of care      Report consisted of patients Situation, Background, Assessment and   Recommendations(SBAR). Information from the following report(s) SBAR, Kardex, Procedure Summary, Intake/Output, MAR and Recent Results was reviewed with the receiving nurse. Opportunity for questions and clarification was provided. Assessment completed upon patients arrival to unit and care assumed. 1830: 2 cc of air taken out of TR band. No s/s of bleeding or hematoma. 1850: 2 cc of air taken out of TR band, No s/s of bleeding or hematoma. 1915: 2 cc of air taken out of TR band. No s/s of bleeding or hematoma. 1930: Bedside shift change report given to [address] (oncoming nurse) by Christianne Romano RN (offgoing nurse). Report included the following information SBAR, Kardex, Intake/Output, MAR and Recent Results. Yes

## 2020-12-10 NOTE — DIETITIAN INITIAL EVALUATION ADULT. - 25 CAL
9689 Oxybutynin Pregnancy And Lactation Text: This medication is Pregnancy Category B and is considered safe during pregnancy. It is unknown if it is excreted in breast milk.

## 2021-06-17 NOTE — OCCUPATIONAL THERAPY INITIAL EVALUATION ADULT - NS ASR FOLLOW COMMAND OT EVAL
Principal Problem:    Dehydration  Active Problems:    Elevated CPK    Fall from standing    Chronic diastolic congestive heart failure (HCC)    Pulmonary hypertension    Elevated troponin    Compression fracture of thoracic vertebra, closed, initial encounter ([facility])    Thrombocytopenia (HCC)    Essential hypertension, benign    Hypothyroidism due to medication    Chronic atrial fibrillation (HCC)    CKD (chronic kidney disease) stage 3, GFR 30-59 ml/min ([facility]    History and Physical Dictated, # [id number]    Time > 35 minutes reviewing chart and patient data, examining and interviewing patient, and discussing with nursing staff, family, etc. able to follow single-step instructions/100% of the time

## 2022-01-01 ENCOUNTER — TRANSCRIPTION ENCOUNTER (OUTPATIENT)
Age: 87
End: 2022-01-01

## 2022-01-01 ENCOUNTER — RESULT REVIEW (OUTPATIENT)
Age: 87
End: 2022-01-01

## 2022-01-01 ENCOUNTER — INPATIENT (INPATIENT)
Facility: HOSPITAL | Age: 87
LOS: 6 days | Discharge: HOME | DRG: 876 | End: 2022-05-24
Attending: INTERNAL MEDICINE | Admitting: INTERNAL MEDICINE
Payer: MEDICARE

## 2022-01-01 VITALS
SYSTOLIC BLOOD PRESSURE: 146 MMHG | RESPIRATION RATE: 20 BRPM | OXYGEN SATURATION: 95 % | DIASTOLIC BLOOD PRESSURE: 74 MMHG | HEART RATE: 85 BPM

## 2022-01-01 VITALS
DIASTOLIC BLOOD PRESSURE: 73 MMHG | WEIGHT: 199.96 LBS | OXYGEN SATURATION: 95 % | RESPIRATION RATE: 18 BRPM | SYSTOLIC BLOOD PRESSURE: 146 MMHG | HEIGHT: 63 IN | TEMPERATURE: 98 F | HEART RATE: 98 BPM

## 2022-01-01 DIAGNOSIS — F32.9 MAJOR DEPRESSIVE DISORDER, SINGLE EPISODE, UNSPECIFIED: ICD-10-CM

## 2022-01-01 DIAGNOSIS — Z85.3 PERSONAL HISTORY OF MALIGNANT NEOPLASM OF BREAST: ICD-10-CM

## 2022-01-01 DIAGNOSIS — F03.91 UNSPECIFIED DEMENTIA WITH BEHAVIORAL DISTURBANCE: ICD-10-CM

## 2022-01-01 DIAGNOSIS — Z98.890 OTHER SPECIFIED POSTPROCEDURAL STATES: Chronic | ICD-10-CM

## 2022-01-01 DIAGNOSIS — R70.0 ELEVATED ERYTHROCYTE SEDIMENTATION RATE: ICD-10-CM

## 2022-01-01 DIAGNOSIS — M21.611 BUNION OF RIGHT FOOT: Chronic | ICD-10-CM

## 2022-01-01 DIAGNOSIS — Z79.01 LONG TERM (CURRENT) USE OF ANTICOAGULANTS: ICD-10-CM

## 2022-01-01 DIAGNOSIS — F41.1 GENERALIZED ANXIETY DISORDER: ICD-10-CM

## 2022-01-01 DIAGNOSIS — C50.919 MALIGNANT NEOPLASM OF UNSPECIFIED SITE OF UNSPECIFIED FEMALE BREAST: ICD-10-CM

## 2022-01-01 DIAGNOSIS — R41.82 ALTERED MENTAL STATUS, UNSPECIFIED: ICD-10-CM

## 2022-01-01 DIAGNOSIS — G35 MULTIPLE SCLEROSIS: ICD-10-CM

## 2022-01-01 DIAGNOSIS — Z29.9 ENCOUNTER FOR PROPHYLACTIC MEASURES, UNSPECIFIED: ICD-10-CM

## 2022-01-01 DIAGNOSIS — K21.9 GASTRO-ESOPHAGEAL REFLUX DISEASE WITHOUT ESOPHAGITIS: ICD-10-CM

## 2022-01-01 DIAGNOSIS — I10 ESSENTIAL (PRIMARY) HYPERTENSION: ICD-10-CM

## 2022-01-01 LAB
-  CEFAZOLIN: SIGNIFICANT CHANGE UP
-  CLINDAMYCIN: SIGNIFICANT CHANGE UP
-  COAGULASE NEGATIVE STAPHYLOCOCCUS: SIGNIFICANT CHANGE UP
-  ERYTHROMYCIN: SIGNIFICANT CHANGE UP
-  LINEZOLID: SIGNIFICANT CHANGE UP
-  OXACILLIN: SIGNIFICANT CHANGE UP
-  RIFAMPIN: SIGNIFICANT CHANGE UP
-  TRIMETHOPRIM/SULFAMETHOXAZOLE: SIGNIFICANT CHANGE UP
-  VANCOMYCIN: SIGNIFICANT CHANGE UP
ALBUMIN SERPL ELPH-MCNC: 3.4 G/DL — SIGNIFICANT CHANGE UP (ref 3.3–5)
ALBUMIN SERPL ELPH-MCNC: 3.6 G/DL — SIGNIFICANT CHANGE UP (ref 3.3–5)
ALP SERPL-CCNC: 104 U/L — SIGNIFICANT CHANGE UP (ref 40–120)
ALP SERPL-CCNC: 114 U/L — SIGNIFICANT CHANGE UP (ref 40–120)
ALT FLD-CCNC: 10 U/L — SIGNIFICANT CHANGE UP (ref 10–45)
ALT FLD-CCNC: 12 U/L — SIGNIFICANT CHANGE UP (ref 10–45)
ANA PAT FLD IF-IMP: ABNORMAL
ANA TITR SER: ABNORMAL
ANION GAP SERPL CALC-SCNC: 12 MMOL/L — SIGNIFICANT CHANGE UP (ref 5–17)
ANION GAP SERPL CALC-SCNC: 13 MMOL/L — SIGNIFICANT CHANGE UP (ref 5–17)
ANION GAP SERPL CALC-SCNC: 16 MMOL/L — SIGNIFICANT CHANGE UP (ref 5–17)
ANION GAP SERPL CALC-SCNC: 16 MMOL/L — SIGNIFICANT CHANGE UP (ref 5–17)
APPEARANCE UR: CLEAR — SIGNIFICANT CHANGE UP
AST SERPL-CCNC: 18 U/L — SIGNIFICANT CHANGE UP (ref 10–40)
AST SERPL-CCNC: 20 U/L — SIGNIFICANT CHANGE UP (ref 10–40)
AUTO DIFF PNL BLD: ABNORMAL
BACTERIA # UR AUTO: PRESENT /HPF
BASE EXCESS BLDV CALC-SCNC: 4.1 MMOL/L — HIGH (ref -2–3)
BASOPHILS # BLD AUTO: 0.05 K/UL — SIGNIFICANT CHANGE UP (ref 0–0.2)
BASOPHILS # BLD AUTO: 0.06 K/UL — SIGNIFICANT CHANGE UP (ref 0–0.2)
BASOPHILS NFR BLD AUTO: 0.6 % — SIGNIFICANT CHANGE UP (ref 0–2)
BASOPHILS NFR BLD AUTO: 1 % — SIGNIFICANT CHANGE UP (ref 0–2)
BILIRUB SERPL-MCNC: 0.2 MG/DL — SIGNIFICANT CHANGE UP (ref 0.2–1.2)
BILIRUB SERPL-MCNC: 0.2 MG/DL — SIGNIFICANT CHANGE UP (ref 0.2–1.2)
BILIRUB UR-MCNC: ABNORMAL
BUN SERPL-MCNC: 20 MG/DL — SIGNIFICANT CHANGE UP (ref 7–23)
BUN SERPL-MCNC: 27 MG/DL — HIGH (ref 7–23)
BUN SERPL-MCNC: 28 MG/DL — HIGH (ref 7–23)
BUN SERPL-MCNC: 29 MG/DL — HIGH (ref 7–23)
C-ANCA SER-ACNC: NEGATIVE — SIGNIFICANT CHANGE UP
CA-I SERPL-SCNC: 1.21 MMOL/L — SIGNIFICANT CHANGE UP (ref 1.15–1.33)
CALCIUM SERPL-MCNC: 9.4 MG/DL — SIGNIFICANT CHANGE UP (ref 8.4–10.5)
CALCIUM SERPL-MCNC: 9.6 MG/DL — SIGNIFICANT CHANGE UP (ref 8.4–10.5)
CALCIUM SERPL-MCNC: 9.6 MG/DL — SIGNIFICANT CHANGE UP (ref 8.4–10.5)
CALCIUM SERPL-MCNC: 9.8 MG/DL — SIGNIFICANT CHANGE UP (ref 8.4–10.5)
CHLORIDE SERPL-SCNC: 100 MMOL/L — SIGNIFICANT CHANGE UP (ref 96–108)
CHLORIDE SERPL-SCNC: 100 MMOL/L — SIGNIFICANT CHANGE UP (ref 96–108)
CHLORIDE SERPL-SCNC: 102 MMOL/L — SIGNIFICANT CHANGE UP (ref 96–108)
CHLORIDE SERPL-SCNC: 98 MMOL/L — SIGNIFICANT CHANGE UP (ref 96–108)
CK SERPL-CCNC: 52 U/L — SIGNIFICANT CHANGE UP (ref 25–170)
CO2 BLDV-SCNC: 29.8 MMOL/L — HIGH (ref 22–26)
CO2 SERPL-SCNC: 24 MMOL/L — SIGNIFICANT CHANGE UP (ref 22–31)
CO2 SERPL-SCNC: 26 MMOL/L — SIGNIFICANT CHANGE UP (ref 22–31)
CO2 SERPL-SCNC: 26 MMOL/L — SIGNIFICANT CHANGE UP (ref 22–31)
CO2 SERPL-SCNC: 28 MMOL/L — SIGNIFICANT CHANGE UP (ref 22–31)
COLOR SPEC: YELLOW — SIGNIFICANT CHANGE UP
CREAT SERPL-MCNC: 0.7 MG/DL — SIGNIFICANT CHANGE UP (ref 0.5–1.3)
CREAT SERPL-MCNC: 0.73 MG/DL — SIGNIFICANT CHANGE UP (ref 0.5–1.3)
CREAT SERPL-MCNC: 0.95 MG/DL — SIGNIFICANT CHANGE UP (ref 0.5–1.3)
CREAT SERPL-MCNC: 0.96 MG/DL — SIGNIFICANT CHANGE UP (ref 0.5–1.3)
CRP SERPL-MCNC: 144.3 MG/L — HIGH (ref 0–4)
CRP SERPL-MCNC: 217.2 MG/L — HIGH (ref 0–4)
CRP SERPL-MCNC: 37 MG/L — HIGH (ref 0–4)
CULTURE RESULTS: SIGNIFICANT CHANGE UP
DIFF PNL FLD: ABNORMAL
EGFR: 57 ML/MIN/1.73M2 — LOW
EGFR: 58 ML/MIN/1.73M2 — LOW
EGFR: 79 ML/MIN/1.73M2 — SIGNIFICANT CHANGE UP
EGFR: 83 ML/MIN/1.73M2 — SIGNIFICANT CHANGE UP
EOSINOPHIL # BLD AUTO: 0.11 K/UL — SIGNIFICANT CHANGE UP (ref 0–0.5)
EOSINOPHIL # BLD AUTO: 0.16 K/UL — SIGNIFICANT CHANGE UP (ref 0–0.5)
EOSINOPHIL NFR BLD AUTO: 1.4 % — SIGNIFICANT CHANGE UP (ref 0–6)
EOSINOPHIL NFR BLD AUTO: 2.6 % — SIGNIFICANT CHANGE UP (ref 0–6)
EPI CELLS # UR: ABNORMAL /HPF (ref 0–5)
ERYTHROCYTE [SEDIMENTATION RATE] IN BLOOD: 68 MM/HR — HIGH
ERYTHROCYTE [SEDIMENTATION RATE] IN BLOOD: 75 MM/HR — HIGH
ERYTHROCYTE [SEDIMENTATION RATE] IN BLOOD: >130 MM/HR — HIGH
GAS PNL BLDV: 139 MMOL/L — SIGNIFICANT CHANGE UP (ref 136–145)
GAS PNL BLDV: SIGNIFICANT CHANGE UP
GLUCOSE SERPL-MCNC: 107 MG/DL — HIGH (ref 70–99)
GLUCOSE SERPL-MCNC: 109 MG/DL — HIGH (ref 70–99)
GLUCOSE SERPL-MCNC: 119 MG/DL — HIGH (ref 70–99)
GLUCOSE SERPL-MCNC: 123 MG/DL — HIGH (ref 70–99)
GLUCOSE UR QL: NEGATIVE — SIGNIFICANT CHANGE UP
GRAM STN FLD: SIGNIFICANT CHANGE UP
HCO3 BLDV-SCNC: 28 MMOL/L — SIGNIFICANT CHANGE UP (ref 22–29)
HCT VFR BLD CALC: 32.3 % — LOW (ref 34.5–45)
HCT VFR BLD CALC: 33.8 % — LOW (ref 34.5–45)
HCT VFR BLD CALC: 35.7 % — SIGNIFICANT CHANGE UP (ref 34.5–45)
HCT VFR BLD CALC: 37 % — SIGNIFICANT CHANGE UP (ref 34.5–45)
HGB BLD-MCNC: 10.4 G/DL — LOW (ref 11.5–15.5)
HGB BLD-MCNC: 10.8 G/DL — LOW (ref 11.5–15.5)
HGB BLD-MCNC: 11.6 G/DL — SIGNIFICANT CHANGE UP (ref 11.5–15.5)
HGB BLD-MCNC: 11.9 G/DL — SIGNIFICANT CHANGE UP (ref 11.5–15.5)
IMM GRANULOCYTES NFR BLD AUTO: 0.3 % — SIGNIFICANT CHANGE UP (ref 0–1.5)
IMM GRANULOCYTES NFR BLD AUTO: 0.7 % — SIGNIFICANT CHANGE UP (ref 0–1.5)
KETONES UR-MCNC: 40 MG/DL
LACTATE SERPL-SCNC: 1.3 MMOL/L — SIGNIFICANT CHANGE UP (ref 0.5–2)
LEUKOCYTE ESTERASE UR-ACNC: ABNORMAL
LYMPHOCYTES # BLD AUTO: 0.98 K/UL — LOW (ref 1–3.3)
LYMPHOCYTES # BLD AUTO: 1.13 K/UL — SIGNIFICANT CHANGE UP (ref 1–3.3)
LYMPHOCYTES # BLD AUTO: 12.7 % — LOW (ref 13–44)
LYMPHOCYTES # BLD AUTO: 18.4 % — SIGNIFICANT CHANGE UP (ref 13–44)
MAGNESIUM SERPL-MCNC: 1.8 MG/DL — SIGNIFICANT CHANGE UP (ref 1.6–2.6)
MAGNESIUM SERPL-MCNC: 2 MG/DL — SIGNIFICANT CHANGE UP (ref 1.6–2.6)
MAGNESIUM SERPL-MCNC: 2.1 MG/DL — SIGNIFICANT CHANGE UP (ref 1.6–2.6)
MCHC RBC-ENTMCNC: 29.2 PG — SIGNIFICANT CHANGE UP (ref 27–34)
MCHC RBC-ENTMCNC: 29.5 PG — SIGNIFICANT CHANGE UP (ref 27–34)
MCHC RBC-ENTMCNC: 29.5 PG — SIGNIFICANT CHANGE UP (ref 27–34)
MCHC RBC-ENTMCNC: 30 PG — SIGNIFICANT CHANGE UP (ref 27–34)
MCHC RBC-ENTMCNC: 32 GM/DL — SIGNIFICANT CHANGE UP (ref 32–36)
MCHC RBC-ENTMCNC: 32.2 GM/DL — SIGNIFICANT CHANGE UP (ref 32–36)
MCHC RBC-ENTMCNC: 32.2 GM/DL — SIGNIFICANT CHANGE UP (ref 32–36)
MCHC RBC-ENTMCNC: 32.5 GM/DL — SIGNIFICANT CHANGE UP (ref 32–36)
MCV RBC AUTO: 90.8 FL — SIGNIFICANT CHANGE UP (ref 80–100)
MCV RBC AUTO: 91.4 FL — SIGNIFICANT CHANGE UP (ref 80–100)
MCV RBC AUTO: 91.8 FL — SIGNIFICANT CHANGE UP (ref 80–100)
MCV RBC AUTO: 93.1 FL — SIGNIFICANT CHANGE UP (ref 80–100)
METHOD TYPE: SIGNIFICANT CHANGE UP
METHOD TYPE: SIGNIFICANT CHANGE UP
MONOCYTES # BLD AUTO: 0.71 K/UL — SIGNIFICANT CHANGE UP (ref 0–0.9)
MONOCYTES # BLD AUTO: 0.72 K/UL — SIGNIFICANT CHANGE UP (ref 0–0.9)
MONOCYTES NFR BLD AUTO: 11.7 % — SIGNIFICANT CHANGE UP (ref 2–14)
MONOCYTES NFR BLD AUTO: 9.2 % — SIGNIFICANT CHANGE UP (ref 2–14)
MPO AB + PR3 PNL SER: SIGNIFICANT CHANGE UP
NEUTROPHILS # BLD AUTO: 4.04 K/UL — SIGNIFICANT CHANGE UP (ref 1.8–7.4)
NEUTROPHILS # BLD AUTO: 5.86 K/UL — SIGNIFICANT CHANGE UP (ref 1.8–7.4)
NEUTROPHILS NFR BLD AUTO: 65.6 % — SIGNIFICANT CHANGE UP (ref 43–77)
NEUTROPHILS NFR BLD AUTO: 75.8 % — SIGNIFICANT CHANGE UP (ref 43–77)
NITRITE UR-MCNC: NEGATIVE — SIGNIFICANT CHANGE UP
NRBC # BLD: 0 /100 WBCS — SIGNIFICANT CHANGE UP (ref 0–0)
ORGANISM # SPEC MICROSCOPIC CNT: SIGNIFICANT CHANGE UP
P-ANCA SER-ACNC: NEGATIVE — SIGNIFICANT CHANGE UP
PCO2 BLDV: 41 MMHG — SIGNIFICANT CHANGE UP (ref 39–42)
PH BLDV: 7.45 — HIGH (ref 7.32–7.43)
PH UR: 6 — SIGNIFICANT CHANGE UP (ref 5–8)
PHOSPHATE SERPL-MCNC: 3.9 MG/DL — SIGNIFICANT CHANGE UP (ref 2.5–4.5)
PHOSPHATE SERPL-MCNC: 4.1 MG/DL — SIGNIFICANT CHANGE UP (ref 2.5–4.5)
PHOSPHATE SERPL-MCNC: 4.2 MG/DL — SIGNIFICANT CHANGE UP (ref 2.5–4.5)
PLATELET # BLD AUTO: 416 K/UL — HIGH (ref 150–400)
PLATELET # BLD AUTO: 433 K/UL — HIGH (ref 150–400)
PLATELET # BLD AUTO: 435 K/UL — HIGH (ref 150–400)
PLATELET # BLD AUTO: 449 K/UL — HIGH (ref 150–400)
PO2 BLDV: 49 MMHG — HIGH (ref 25–45)
POTASSIUM BLDV-SCNC: 4.1 MMOL/L — SIGNIFICANT CHANGE UP (ref 3.5–5.1)
POTASSIUM SERPL-MCNC: 3.7 MMOL/L — SIGNIFICANT CHANGE UP (ref 3.5–5.3)
POTASSIUM SERPL-MCNC: 3.9 MMOL/L — SIGNIFICANT CHANGE UP (ref 3.5–5.3)
POTASSIUM SERPL-MCNC: 4 MMOL/L — SIGNIFICANT CHANGE UP (ref 3.5–5.3)
POTASSIUM SERPL-MCNC: 4.2 MMOL/L — SIGNIFICANT CHANGE UP (ref 3.5–5.3)
POTASSIUM SERPL-SCNC: 3.7 MMOL/L — SIGNIFICANT CHANGE UP (ref 3.5–5.3)
POTASSIUM SERPL-SCNC: 3.9 MMOL/L — SIGNIFICANT CHANGE UP (ref 3.5–5.3)
POTASSIUM SERPL-SCNC: 4 MMOL/L — SIGNIFICANT CHANGE UP (ref 3.5–5.3)
POTASSIUM SERPL-SCNC: 4.2 MMOL/L — SIGNIFICANT CHANGE UP (ref 3.5–5.3)
PROT SERPL-MCNC: 6.7 G/DL — SIGNIFICANT CHANGE UP (ref 6–8.3)
PROT SERPL-MCNC: 7.7 G/DL — SIGNIFICANT CHANGE UP (ref 6–8.3)
PROT UR-MCNC: 30 MG/DL
RBC # BLD: 3.47 M/UL — LOW (ref 3.8–5.2)
RBC # BLD: 3.7 M/UL — LOW (ref 3.8–5.2)
RBC # BLD: 3.93 M/UL — SIGNIFICANT CHANGE UP (ref 3.8–5.2)
RBC # BLD: 4.03 M/UL — SIGNIFICANT CHANGE UP (ref 3.8–5.2)
RBC # FLD: 12.6 % — SIGNIFICANT CHANGE UP (ref 10.3–14.5)
RBC # FLD: 12.8 % — SIGNIFICANT CHANGE UP (ref 10.3–14.5)
RBC CASTS # UR COMP ASSIST: < 5 /HPF — SIGNIFICANT CHANGE UP
RHEUMATOID FACT SERPL-ACNC: 10 IU/ML — SIGNIFICANT CHANGE UP (ref 0–13)
SAO2 % BLDV: 86.6 % — SIGNIFICANT CHANGE UP (ref 67–88)
SARS-COV-2 RNA SPEC QL NAA+PROBE: NEGATIVE — SIGNIFICANT CHANGE UP
SARS-COV-2 RNA SPEC QL NAA+PROBE: NEGATIVE — SIGNIFICANT CHANGE UP
SODIUM SERPL-SCNC: 139 MMOL/L — SIGNIFICANT CHANGE UP (ref 135–145)
SODIUM SERPL-SCNC: 140 MMOL/L — SIGNIFICANT CHANGE UP (ref 135–145)
SODIUM SERPL-SCNC: 140 MMOL/L — SIGNIFICANT CHANGE UP (ref 135–145)
SODIUM SERPL-SCNC: 142 MMOL/L — SIGNIFICANT CHANGE UP (ref 135–145)
SP GR SPEC: >=1.03 — SIGNIFICANT CHANGE UP (ref 1–1.03)
SPECIMEN SOURCE: SIGNIFICANT CHANGE UP
SURGICAL PATHOLOGY STUDY: SIGNIFICANT CHANGE UP
T PALLIDUM AB TITR SER: NEGATIVE — SIGNIFICANT CHANGE UP
TROPONIN T SERPL-MCNC: <0.01 NG/ML — SIGNIFICANT CHANGE UP (ref 0–0.01)
UROBILINOGEN FLD QL: 0.2 E.U./DL — SIGNIFICANT CHANGE UP
WBC # BLD: 6.1 K/UL — SIGNIFICANT CHANGE UP (ref 3.8–10.5)
WBC # BLD: 6.15 K/UL — SIGNIFICANT CHANGE UP (ref 3.8–10.5)
WBC # BLD: 7.42 K/UL — SIGNIFICANT CHANGE UP (ref 3.8–10.5)
WBC # BLD: 7.73 K/UL — SIGNIFICANT CHANGE UP (ref 3.8–10.5)
WBC # FLD AUTO: 6.1 K/UL — SIGNIFICANT CHANGE UP (ref 3.8–10.5)
WBC # FLD AUTO: 6.15 K/UL — SIGNIFICANT CHANGE UP (ref 3.8–10.5)
WBC # FLD AUTO: 7.42 K/UL — SIGNIFICANT CHANGE UP (ref 3.8–10.5)
WBC # FLD AUTO: 7.73 K/UL — SIGNIFICANT CHANGE UP (ref 3.8–10.5)
WBC UR QL: < 5 /HPF — SIGNIFICANT CHANGE UP

## 2022-01-01 PROCEDURE — 96361 HYDRATE IV INFUSION ADD-ON: CPT

## 2022-01-01 PROCEDURE — 36415 COLL VENOUS BLD VENIPUNCTURE: CPT

## 2022-01-01 PROCEDURE — 99233 SBSQ HOSP IP/OBS HIGH 50: CPT

## 2022-01-01 PROCEDURE — 80053 COMPREHEN METABOLIC PANEL: CPT

## 2022-01-01 PROCEDURE — 99221 1ST HOSP IP/OBS SF/LOW 40: CPT

## 2022-01-01 PROCEDURE — 99285 EMERGENCY DEPT VISIT HI MDM: CPT

## 2022-01-01 PROCEDURE — 87635 SARS-COV-2 COVID-19 AMP PRB: CPT

## 2022-01-01 PROCEDURE — 88305 TISSUE EXAM BY PATHOLOGIST: CPT | Mod: 26

## 2022-01-01 PROCEDURE — 86431 RHEUMATOID FACTOR QUANT: CPT

## 2022-01-01 PROCEDURE — 70450 CT HEAD/BRAIN W/O DYE: CPT | Mod: MA

## 2022-01-01 PROCEDURE — 87186 SC STD MICRODIL/AGAR DIL: CPT

## 2022-01-01 PROCEDURE — 82803 BLOOD GASES ANY COMBINATION: CPT

## 2022-01-01 PROCEDURE — 71045 X-RAY EXAM CHEST 1 VIEW: CPT | Mod: 26

## 2022-01-01 PROCEDURE — 82330 ASSAY OF CALCIUM: CPT

## 2022-01-01 PROCEDURE — 93010 ELECTROCARDIOGRAM REPORT: CPT

## 2022-01-01 PROCEDURE — 86140 C-REACTIVE PROTEIN: CPT

## 2022-01-01 PROCEDURE — 99222 1ST HOSP IP/OBS MODERATE 55: CPT

## 2022-01-01 PROCEDURE — 87150 DNA/RNA AMPLIFIED PROBE: CPT

## 2022-01-01 PROCEDURE — 86038 ANTINUCLEAR ANTIBODIES: CPT

## 2022-01-01 PROCEDURE — 83605 ASSAY OF LACTIC ACID: CPT

## 2022-01-01 PROCEDURE — 87040 BLOOD CULTURE FOR BACTERIA: CPT

## 2022-01-01 PROCEDURE — 96360 HYDRATION IV INFUSION INIT: CPT

## 2022-01-01 PROCEDURE — 82550 ASSAY OF CK (CPK): CPT

## 2022-01-01 PROCEDURE — 85652 RBC SED RATE AUTOMATED: CPT

## 2022-01-01 PROCEDURE — 97161 PT EVAL LOW COMPLEX 20 MIN: CPT

## 2022-01-01 PROCEDURE — 88313 SPECIAL STAINS GROUP 2: CPT

## 2022-01-01 PROCEDURE — 37609 LIGATION/BX TEMPORAL ARTERY: CPT | Mod: GC

## 2022-01-01 PROCEDURE — 83735 ASSAY OF MAGNESIUM: CPT

## 2022-01-01 PROCEDURE — 84132 ASSAY OF SERUM POTASSIUM: CPT

## 2022-01-01 PROCEDURE — 84295 ASSAY OF SERUM SODIUM: CPT

## 2022-01-01 PROCEDURE — 86036 ANCA SCREEN EACH ANTIBODY: CPT

## 2022-01-01 PROCEDURE — 84484 ASSAY OF TROPONIN QUANT: CPT

## 2022-01-01 PROCEDURE — 88305 TISSUE EXAM BY PATHOLOGIST: CPT

## 2022-01-01 PROCEDURE — 80048 BASIC METABOLIC PNL TOTAL CA: CPT

## 2022-01-01 PROCEDURE — 99223 1ST HOSP IP/OBS HIGH 75: CPT

## 2022-01-01 PROCEDURE — 86780 TREPONEMA PALLIDUM: CPT

## 2022-01-01 PROCEDURE — 81001 URINALYSIS AUTO W/SCOPE: CPT

## 2022-01-01 PROCEDURE — 85027 COMPLETE CBC AUTOMATED: CPT

## 2022-01-01 PROCEDURE — 85025 COMPLETE CBC W/AUTO DIFF WBC: CPT

## 2022-01-01 PROCEDURE — 70450 CT HEAD/BRAIN W/O DYE: CPT | Mod: 26,MA

## 2022-01-01 PROCEDURE — 99231 SBSQ HOSP IP/OBS SF/LOW 25: CPT

## 2022-01-01 PROCEDURE — 84100 ASSAY OF PHOSPHORUS: CPT

## 2022-01-01 PROCEDURE — 71045 X-RAY EXAM CHEST 1 VIEW: CPT

## 2022-01-01 PROCEDURE — 88313 SPECIAL STAINS GROUP 2: CPT | Mod: 26

## 2022-01-01 DEVICE — CLIP APPLIER ETHICON LIGACLIP 9 3/8" SMALL: Type: IMPLANTABLE DEVICE | Status: FUNCTIONAL

## 2022-01-01 RX ORDER — FLUOXETINE HCL 10 MG
10 CAPSULE ORAL DAILY
Refills: 0 | Status: DISCONTINUED | OUTPATIENT
Start: 2022-01-01 | End: 2022-01-01

## 2022-01-01 RX ORDER — SODIUM CHLORIDE 9 MG/ML
1000 INJECTION INTRAMUSCULAR; INTRAVENOUS; SUBCUTANEOUS ONCE
Refills: 0 | Status: COMPLETED | OUTPATIENT
Start: 2022-01-01 | End: 2022-01-01

## 2022-01-01 RX ORDER — AMLODIPINE BESYLATE 2.5 MG/1
10 TABLET ORAL DAILY
Refills: 0 | Status: DISCONTINUED | OUTPATIENT
Start: 2022-01-01 | End: 2022-01-01

## 2022-01-01 RX ORDER — QUETIAPINE FUMARATE 200 MG/1
75 TABLET, FILM COATED ORAL AT BEDTIME
Refills: 0 | Status: DISCONTINUED | OUTPATIENT
Start: 2022-01-01 | End: 2022-01-01

## 2022-01-01 RX ORDER — ARMODAFINIL 200 MG/1
0 TABLET ORAL
Qty: 0 | Refills: 0 | DISCHARGE

## 2022-01-01 RX ORDER — ALPRAZOLAM 0.25 MG
0.5 TABLET ORAL AT BEDTIME
Refills: 0 | Status: DISCONTINUED | OUTPATIENT
Start: 2022-01-01 | End: 2022-01-01

## 2022-01-01 RX ORDER — ATORVASTATIN CALCIUM 80 MG/1
20 TABLET, FILM COATED ORAL AT BEDTIME
Refills: 0 | Status: DISCONTINUED | OUTPATIENT
Start: 2022-01-01 | End: 2022-01-01

## 2022-01-01 RX ORDER — BUPROPION HYDROCHLORIDE 150 MG/1
450 TABLET, EXTENDED RELEASE ORAL DAILY
Refills: 0 | Status: DISCONTINUED | OUTPATIENT
Start: 2022-01-01 | End: 2022-01-01

## 2022-01-01 RX ORDER — QUETIAPINE FUMARATE 200 MG/1
1 TABLET, FILM COATED ORAL
Qty: 0 | Refills: 0 | DISCHARGE
Start: 2022-01-01

## 2022-01-01 RX ORDER — AMLODIPINE BESYLATE 2.5 MG/1
10 TABLET ORAL ONCE
Refills: 0 | Status: COMPLETED | OUTPATIENT
Start: 2022-01-01 | End: 2022-01-01

## 2022-01-01 RX ORDER — PANTOPRAZOLE SODIUM 20 MG/1
40 TABLET, DELAYED RELEASE ORAL
Refills: 0 | Status: DISCONTINUED | OUTPATIENT
Start: 2022-01-01 | End: 2022-01-01

## 2022-01-01 RX ORDER — ENOXAPARIN SODIUM 100 MG/ML
40 INJECTION SUBCUTANEOUS EVERY 24 HOURS
Refills: 0 | Status: DISCONTINUED | OUTPATIENT
Start: 2022-01-01 | End: 2022-01-01

## 2022-01-01 RX ORDER — QUETIAPINE FUMARATE 200 MG/1
3 TABLET, FILM COATED ORAL
Qty: 0 | Refills: 0 | DISCHARGE
Start: 2022-01-01

## 2022-01-01 RX ORDER — ALPRAZOLAM 0.25 MG
0 TABLET ORAL
Qty: 0 | Refills: 0 | DISCHARGE

## 2022-01-01 RX ORDER — RIVAROXABAN 15 MG-20MG
1 KIT ORAL
Qty: 0 | Refills: 0 | DISCHARGE

## 2022-01-01 RX ORDER — QUETIAPINE FUMARATE 200 MG/1
25 TABLET, FILM COATED ORAL ONCE
Refills: 0 | Status: COMPLETED | OUTPATIENT
Start: 2022-01-01 | End: 2022-01-01

## 2022-01-01 RX ORDER — TRAZODONE HCL 50 MG
1 TABLET ORAL
Qty: 0 | Refills: 0 | DISCHARGE

## 2022-01-01 RX ORDER — POTASSIUM CHLORIDE 20 MEQ
40 PACKET (EA) ORAL ONCE
Refills: 0 | Status: COMPLETED | OUTPATIENT
Start: 2022-01-01 | End: 2022-01-01

## 2022-01-01 RX ORDER — AMLODIPINE BESYLATE 2.5 MG/1
1 TABLET ORAL
Qty: 0 | Refills: 0 | DISCHARGE

## 2022-01-01 RX ORDER — LANOLIN ALCOHOL/MO/W.PET/CERES
3 CREAM (GRAM) TOPICAL AT BEDTIME
Refills: 0 | Status: DISCONTINUED | OUTPATIENT
Start: 2022-01-01 | End: 2022-01-01

## 2022-01-01 RX ORDER — MAGNESIUM SULFATE 500 MG/ML
1 VIAL (ML) INJECTION ONCE
Refills: 0 | Status: COMPLETED | OUTPATIENT
Start: 2022-01-01 | End: 2022-01-01

## 2022-01-01 RX ORDER — ACETAMINOPHEN 500 MG
650 TABLET ORAL EVERY 6 HOURS
Refills: 0 | Status: DISCONTINUED | OUTPATIENT
Start: 2022-01-01 | End: 2022-01-01

## 2022-01-01 RX ADMIN — PANTOPRAZOLE SODIUM 40 MILLIGRAM(S): 20 TABLET, DELAYED RELEASE ORAL at 06:50

## 2022-01-01 RX ADMIN — PANTOPRAZOLE SODIUM 40 MILLIGRAM(S): 20 TABLET, DELAYED RELEASE ORAL at 06:03

## 2022-01-01 RX ADMIN — ATORVASTATIN CALCIUM 20 MILLIGRAM(S): 80 TABLET, FILM COATED ORAL at 22:22

## 2022-01-01 RX ADMIN — BUPROPION HYDROCHLORIDE 450 MILLIGRAM(S): 150 TABLET, EXTENDED RELEASE ORAL at 12:06

## 2022-01-01 RX ADMIN — QUETIAPINE FUMARATE 75 MILLIGRAM(S): 200 TABLET, FILM COATED ORAL at 22:22

## 2022-01-01 RX ADMIN — AMLODIPINE BESYLATE 10 MILLIGRAM(S): 2.5 TABLET ORAL at 10:18

## 2022-01-01 RX ADMIN — ATORVASTATIN CALCIUM 20 MILLIGRAM(S): 80 TABLET, FILM COATED ORAL at 22:42

## 2022-01-01 RX ADMIN — ATORVASTATIN CALCIUM 20 MILLIGRAM(S): 80 TABLET, FILM COATED ORAL at 21:28

## 2022-01-01 RX ADMIN — BUPROPION HYDROCHLORIDE 450 MILLIGRAM(S): 150 TABLET, EXTENDED RELEASE ORAL at 10:26

## 2022-01-01 RX ADMIN — QUETIAPINE FUMARATE 75 MILLIGRAM(S): 200 TABLET, FILM COATED ORAL at 22:09

## 2022-01-01 RX ADMIN — QUETIAPINE FUMARATE 25 MILLIGRAM(S): 200 TABLET, FILM COATED ORAL at 08:58

## 2022-01-01 RX ADMIN — ATORVASTATIN CALCIUM 20 MILLIGRAM(S): 80 TABLET, FILM COATED ORAL at 21:51

## 2022-01-01 RX ADMIN — QUETIAPINE FUMARATE 75 MILLIGRAM(S): 200 TABLET, FILM COATED ORAL at 22:42

## 2022-01-01 RX ADMIN — ENOXAPARIN SODIUM 40 MILLIGRAM(S): 100 INJECTION SUBCUTANEOUS at 22:09

## 2022-01-01 RX ADMIN — SODIUM CHLORIDE 1000 MILLILITER(S): 9 INJECTION INTRAMUSCULAR; INTRAVENOUS; SUBCUTANEOUS at 13:00

## 2022-01-01 RX ADMIN — BUPROPION HYDROCHLORIDE 450 MILLIGRAM(S): 150 TABLET, EXTENDED RELEASE ORAL at 12:19

## 2022-01-01 RX ADMIN — AMLODIPINE BESYLATE 10 MILLIGRAM(S): 2.5 TABLET ORAL at 17:58

## 2022-01-01 RX ADMIN — AMLODIPINE BESYLATE 10 MILLIGRAM(S): 2.5 TABLET ORAL at 06:50

## 2022-01-01 RX ADMIN — ENOXAPARIN SODIUM 40 MILLIGRAM(S): 100 INJECTION SUBCUTANEOUS at 22:42

## 2022-01-01 RX ADMIN — PANTOPRAZOLE SODIUM 40 MILLIGRAM(S): 20 TABLET, DELAYED RELEASE ORAL at 07:05

## 2022-01-01 RX ADMIN — Medication 10 MILLIGRAM(S): at 13:26

## 2022-01-01 RX ADMIN — Medication 10 MILLIGRAM(S): at 12:37

## 2022-01-01 RX ADMIN — ENOXAPARIN SODIUM 40 MILLIGRAM(S): 100 INJECTION SUBCUTANEOUS at 22:25

## 2022-01-01 RX ADMIN — QUETIAPINE FUMARATE 25 MILLIGRAM(S): 200 TABLET, FILM COATED ORAL at 12:32

## 2022-01-01 RX ADMIN — QUETIAPINE FUMARATE 75 MILLIGRAM(S): 200 TABLET, FILM COATED ORAL at 21:51

## 2022-01-01 RX ADMIN — AMLODIPINE BESYLATE 10 MILLIGRAM(S): 2.5 TABLET ORAL at 13:34

## 2022-01-01 RX ADMIN — ATORVASTATIN CALCIUM 20 MILLIGRAM(S): 80 TABLET, FILM COATED ORAL at 22:09

## 2022-01-01 RX ADMIN — BUPROPION HYDROCHLORIDE 450 MILLIGRAM(S): 150 TABLET, EXTENDED RELEASE ORAL at 12:36

## 2022-01-01 RX ADMIN — SODIUM CHLORIDE 1000 MILLILITER(S): 9 INJECTION INTRAMUSCULAR; INTRAVENOUS; SUBCUTANEOUS at 11:20

## 2022-01-01 RX ADMIN — BUPROPION HYDROCHLORIDE 450 MILLIGRAM(S): 150 TABLET, EXTENDED RELEASE ORAL at 13:26

## 2022-01-01 RX ADMIN — ATORVASTATIN CALCIUM 20 MILLIGRAM(S): 80 TABLET, FILM COATED ORAL at 22:26

## 2022-01-01 RX ADMIN — Medication 10 MILLIGRAM(S): at 13:30

## 2022-01-01 RX ADMIN — ENOXAPARIN SODIUM 40 MILLIGRAM(S): 100 INJECTION SUBCUTANEOUS at 22:23

## 2022-01-01 RX ADMIN — BUPROPION HYDROCHLORIDE 450 MILLIGRAM(S): 150 TABLET, EXTENDED RELEASE ORAL at 13:30

## 2022-01-01 RX ADMIN — Medication 100 GRAM(S): at 19:04

## 2022-01-01 RX ADMIN — Medication 10 MILLIGRAM(S): at 12:19

## 2022-01-01 RX ADMIN — QUETIAPINE FUMARATE 75 MILLIGRAM(S): 200 TABLET, FILM COATED ORAL at 21:28

## 2022-01-01 RX ADMIN — BUPROPION HYDROCHLORIDE 450 MILLIGRAM(S): 150 TABLET, EXTENDED RELEASE ORAL at 17:58

## 2022-01-01 RX ADMIN — Medication 10 MILLIGRAM(S): at 12:06

## 2022-01-01 RX ADMIN — Medication 40 MILLIEQUIVALENT(S): at 19:04

## 2022-01-01 RX ADMIN — ENOXAPARIN SODIUM 40 MILLIGRAM(S): 100 INJECTION SUBCUTANEOUS at 21:28

## 2022-01-01 RX ADMIN — AMLODIPINE BESYLATE 10 MILLIGRAM(S): 2.5 TABLET ORAL at 06:03

## 2022-01-01 RX ADMIN — QUETIAPINE FUMARATE 75 MILLIGRAM(S): 200 TABLET, FILM COATED ORAL at 22:25

## 2022-01-01 RX ADMIN — Medication 10 MILLIGRAM(S): at 10:27

## 2022-01-01 RX ADMIN — Medication 10 MILLIGRAM(S): at 17:59

## 2022-01-01 RX ADMIN — AMLODIPINE BESYLATE 10 MILLIGRAM(S): 2.5 TABLET ORAL at 14:00

## 2022-01-01 RX ADMIN — ENOXAPARIN SODIUM 40 MILLIGRAM(S): 100 INJECTION SUBCUTANEOUS at 21:51

## 2022-05-17 NOTE — PATIENT PROFILE ADULT - FALL HARM RISK - HARM RISK INTERVENTIONS
Assistance with ambulation/Assistance OOB with selected safe patient handling equipment/Communicate Risk of Fall with Harm to all staff/Discuss with provider need for PT consult/Monitor gait and stability/Reinforce activity limits and safety measures with patient and family/Tailored Fall Risk Interventions/Visual Cue: Yellow wristband and red socks/Bed in lowest position, wheels locked, appropriate side rails in place/Call bell, personal items and telephone in reach/Instruct patient to call for assistance before getting out of bed or chair/Non-slip footwear when patient is out of bed/[place] to call system/Physically safe environment - no spills, clutter or unnecessary equipment/Purposeful Proactive Rounding/Room/bathroom lighting operational, light cord in reach

## 2022-05-17 NOTE — ED PROVIDER NOTE - CONSTITUTIONAL, MLM
normal... Awake, alert, oriented x 1 to person, no acute distress, combative at times and easily agitated.

## 2022-05-17 NOTE — PROGRESS NOTE ADULT - SUBJECTIVE AND OBJECTIVE BOX
Patient has demonstrated a slow progression in cognitive decline over the past year but remained ambulatory and functional to the degree that she could participate in ADL's and attend eventxs such as theatre and opera however over the past month there has been a sharp decline associated with marked increase in aggressive behavior.  withint he past 2 weeks she acted aggressively to her staff including biting and hitting though wihtout signifcant harm and then forgets that she has done it.    She was started on low dose seroquel to try and calm her  down particularly at night so her staff could attend to her and it was somewhat helpful.  I made a housecall and blood work demosntrated a marked increase in CRP from normal range in OCtober 2021 as well as an elevated troponin so she was referred to the ER     In the ER CRP remained markedly elevated though troponin was normal and luekocytosis had resolved.

## 2022-05-17 NOTE — H&P ADULT - PROBLEM SELECTOR PLAN 6
F: per PO   E: replete to K>4, Mg>2, Phos>2.5  N: DASH   Ppx: lovenox     Code Status: FULL     Dispo: [facility]

## 2022-05-17 NOTE — ED ADULT NURSE NOTE - OBJECTIVE STATEMENT
Patient sent to ER by PCP due to abd labs and increase in agitation. Pt alert and oriented to self and caregiver, able to ambulate with assistance.

## 2022-05-17 NOTE — H&P ADULT - NSHPPHYSICALEXAM_GEN_ALL_CORE
Vital Signs Last 24 Hrs  T(C): 36.4 (17 May 2022 10:18), Max: 36.4 (17 May 2022 10:18)  T(F): 97.5 (17 May 2022 10:18), Max: 97.5 (17 May 2022 10:18)  HR: 94 (17 May 2022 12:55) (94 - 98)  BP: 145/82 (17 May 2022 12:55) (145/82 - 146/73)  BP(mean): --  RR: 18 (17 May 2022 12:55) (18 - 18)  SpO2: 95% (17 May 2022 12:55) (95% - 95%)    GENERAL: NAD, lying in bed comfortably  HEAD:  Atraumatic, Normocephalic  EYES: EOMI, PERRLA, conjunctiva and sclera clear  ENT: Moist mucous membranes  NECK: Supple, No JVD  CHEST/LUNG: Clear to auscultation bilaterally; No rales, rhonchi, wheezing, or rubs. Unlabored respirations  HEART: Regular rate and rhythm; No murmurs, rubs, or gallops  ABDOMEN: Bowel sounds present; Soft, Nontender, Nondistended. No hepatomegally  EXTREMITIES:  2+ Peripheral Pulses, brisk capillary refill. No clubbing, cyanosis, or edema  NERVOUS SYSTEM:  Alert & Oriented X3, speech clear. No deficits   MSK: FROM all 4 extremities, full and equal strength  SKIN: No rashes or lesions Vital Signs Last 24 Hrs  T(C): 36.4 (17 May 2022 10:18), Max: 36.4 (17 May 2022 10:18)  T(F): 97.5 (17 May 2022 10:18), Max: 97.5 (17 May 2022 10:18)  HR: 94 (17 May 2022 12:55) (94 - 98)  BP: 145/82 (17 May 2022 12:55) (145/82 - 146/73)  BP(mean): --  RR: 18 (17 May 2022 12:55) (18 - 18)  SpO2: 95% (17 May 2022 12:55) (95% - 95%)    GENERAL: agitated   HEAD:  Atraumatic, Normocephalic  EYES: EOMI, PERRLA, conjunctiva and sclera clear  ENT: Moist mucous membranes  NECK: Supple, No JVD  CHEST/LUNG: Clear to auscultation bilaterally; No rales, rhonchi, wheezing, or rubs.   HEART: Regular rate and rhythm; 2/4 LSB systolic murmur  ABDOMEN: Bowel sounds present; Soft, Nontender, Nondistended. No hepatomegally  EXTREMITIES:  2+ Peripheral Pulses, brisk capillary refill. No clubbing, cyanosis, or edema  NERVOUS SYSTEM:  Alert & Oriented X3. No focal deficits.   PSYCH: agitated, stating that HHA and  "are the devil," forgets information and people quickly    MSK: FROM all 4 extremities  SKIN: No rashes or lesions

## 2022-05-17 NOTE — ED ADULT TRIAGE NOTE - CHIEF COMPLAINT QUOTE
Pt BIBEMS from home c/o abnormal labs. As per home health aide, "her doctor called and said something in her blood work was abnormal but they did not say what. They told us to come here". As per home health aide hx of dementia, more agitated than usual. Pt does not endorse any complaints at this time.

## 2022-05-17 NOTE — H&P ADULT - PROBLEM SELECTOR PLAN 3
Patient with history of GERD. Home medication: Dexilant 60 mg q daily.    - c/w pantoprazole 40 mg q daily

## 2022-05-17 NOTE — H&P ADULT - PROBLEM SELECTOR PLAN 1
Patient presenting with increased agitation, decreased PO intake over past week, i/s/o dementia. Patient with outpatient labs showing elevated WBC with neutrophilic predominance, increased CRP, decreased ferritin, increased troponin I. Admission labs today with CRP of 217 from [address] outpatient. CT head with age related changes no acute pathology. Patient is very agitated on exam, AAO x3 but forgetful. Home medication: fluoxetine 20 mg q daily, bupropion 450 mg q daily, seroquel 25mg in AM and 75 mg in PM, alprazolam 0.5 mg in PM and trazodone 50 mg in PM.     - f/u B12  - f/u TSH  - f/u blood cultures   - c/w home medications Patient presenting with increased agitation, decreased PO intake over past week, i/s/o dementia. Patient with outpatient labs showing elevated WBC with neutrophilic predominance, increased CRP, decreased ferritin, increased troponin I. Admission labs today with CRP of 217 from 40 outpatient. CT head with age related changes no acute pathology. Patient is very agitated on exam, AAO x3 but forgetful. Home medication: fluoxetine 20 mg q daily, bupropion 450 mg q daily, seroquel 25mg in AM and 75 mg in PM, alprazolam 0.5 mg in PM and trazodone 50 mg in PM.     - f/u rheum consult  - neuro consult in AM  - psych consult in AM   - f/u syphilis  - f/u ESR,CRP  - f/u PAU, RF, anca  - f/u blood cultures   - c/w home medications Patient presenting with increased agitation, decreased PO intake over past week, i/s/o dementia. Patient with outpatient labs showing elevated WBC with neutrophilic predominance, increased CRP, decreased ferritin, increased troponin I. Admission labs today with CRP of 217 from 40 outpatient. CT head with age related changes no acute pathology. Patient is very agitated on exam, AAO x3 but forgetful. Home medication: fluoxetine 20 mg q daily, bupropion 450 mg q daily, seroquel 25mg in AM and 75 mg in PM, alprazolam 0.5 mg in PM and trazodone 50 mg in PM.     - rheum consulted, f/u recs  - neuro consulted, f/u recs  - psych consult in AM   - f/u syphilis  - f/u ESR,CRP  - f/u PAU, RF, anca  - f/u blood cultures   - c/w home medications

## 2022-05-17 NOTE — H&P ADULT - NSHPLABSRESULTS_GEN_ALL_CORE
LABS:  05-17 @ 11:39 Creatine 52 U/L [25 - 170]  cret                        11.9   7.73  )-----------( 435      ( 17 May 2022 11:39 )             37.0     05-17    140  |  98  |  27<H>  ----------------------------<  119<H>  4.2   |  26  |  0.73    Ca    9.8      17 May 2022 11:39    TPro  7.7  /  Alb  3.6  /  TBili  0.2  /  DBili  x   /  AST  20  /  ALT  12  /  AlkPhos  114  05-17

## 2022-05-17 NOTE — ED PROVIDER NOTE - CLINICAL SUMMARY MEDICAL DECISION MAKING FREE TEXT BOX
87 y/o F PMHx mild dementia, seemingly progressive over the past couple of months, now sent by PCP Dr. Barry for further evaluation of progressive mental status change and agitation with outpatient lab abnormalities including elevated WBC count, CRP, and trop. Pt difficult to provide history but has no apparent complaints. Stable vitals, dry mucous membranes. Differentials are broad and includes toxic metabolic and cardiac etiologies. Will check labs, trop, cultures, CT head, EKG, chest xray, and reassess need for additional treatment, evaluation, and/or admission.

## 2022-05-17 NOTE — ED PROVIDER NOTE - OBJECTIVE STATEMENT
87 y/o F PMHx mild dementia, MS, breast ca, BIBA from home for abnormal labs. Per HHA at bedside, Dr. Barry was called when Pt was noted to have WBC 14, elevated trop, elevated CRP and sent her to ED. Per  at bedside, Pt has hx dementia and is more agitated than usual. Pt c/o dehydration. Denies chest pain, SOB, fever, cough, headache. Per , Pt has been constipated for the past few weeks and has had a nonproductive cough for the past week, as well as appearing weaker than usual for the past week. No leg swelling. Pt was started on Seroquel this morning.

## 2022-05-17 NOTE — CONSULT NOTE ADULT - ASSESSMENT
This is an 87 yo F w/ PMHx breast cancer BIBA from home for abnormal labs in the setting of AMS. Patient was seen by Dr. Barry at her home and was noticed to be altered with increasing agitation as reported by the home health aid. Per health aid, patient has been increasingly agitated and "stubborn" over the past several weeks in the setting of elevated ESR/CRP. Likely due to age related cognitive changes and likely underlying dementia with recent agitation, for which seroquel was recently added.    Plan:  - CT Head reviewed, chronic changes with mild increase in atrophy since 2018  - No need for additional imaging at this time  - ESR 75,  were both elevated  - Would recommend Psych eval at this time  - Continue rest of care as per primary team   This is an 87 yo F w/ PMHx breast cancer BIBA from home for abnormal labs in the setting of AMS. Patient was seen by Dr. Barry at her home and was noticed to be altered with increasing agitation as reported by the home health aid. Per health aid, patient has been increasingly agitated and "stubborn" over the past several weeks in the setting of elevated ESR/CRP. Likely due to age related cognitive changes and likely underlying dementia with recent agitation, for which seroquel was recently added.    Plan:  - CT Head reviewed, chronic changes with mild increase in atrophy since 2018  - No need for additional inpatient neurological imaging at this time  - ESR 75,  were both elevated  - Would recommend Psych eval at this time  - Continue rest of care as per primary team

## 2022-05-17 NOTE — ED PROVIDER NOTE - CARDIAC, MLM
3/6 diastolic murmur, best heard over the left upper external border, no radiation to carotids, no JVD.

## 2022-05-17 NOTE — ED PROVIDER NOTE - MUSCULOSKELETAL, MLM
Spine appears normal, range of motion is not limited, no muscle or joint tenderness. No lower extremity edema or calf tenderness.

## 2022-05-17 NOTE — ED PROVIDER NOTE - ENMT, MLM
Airway patent, Nasal mucosa clear. Dry mucous membranes. Throat has no vesicles, no oropharyngeal exudates and uvula is midline. No meningeal signs.

## 2022-05-17 NOTE — CONSULT NOTE ADULT - ATTENDING COMMENTS
I was physically present for the key portions of the evaluation and managemnent (E/M) service provided.  I agree with the above history, physical, and plan which I have reviewed and edited where appropriate, with the exceptions as per my note.    pt seen and examined on 5/18/22    aox3  alert, attentive to examiner.  language fully intact, uses complex expressions.  face symm  eom appear grossly intact  moves all 4 ext well and gestures easily with either arm    CTH reviewed.    AP: pt carries a chronic hx of dementia, now with behavioral disturbances, agitation..    - appreciate psych recs for agitation  - call with questions. I was physically present for the key portions of the evaluation and managemnent (E/M) service provided.  I agree with the above history, physical, and plan which I have reviewed and edited where appropriate, with the exceptions as per my note.    pt seen and examined on 5/18/22    aox3  alert, attentive to examiner.  language fully intact, uses complex expressions.  face symm  eom appear grossly intact  moves all 4 ext well and gestures easily with either arm    CTH reviewed - chronic microvascular changes.    AP: pt carries a chronic hx of dementia, now with behavioral disturbances, agitation..    - appreciate psych recs for agitation  - call with questions.

## 2022-05-17 NOTE — H&P ADULT - ASSESSMENT
Patient is an 87 y/o F PMHx mild dementia, MS, breast cancer BIBA from home for abnormal labs, agitation and weakness x 1 week admitted for further work up of AMS.

## 2022-05-17 NOTE — CONSULT NOTE ADULT - SUBJECTIVE AND OBJECTIVE BOX
Neurology Consult Note    HPI:  This is an 87 yo F w/ PMHx breast cancer BIBA from home for abnormal labs in the setting of AMS. Patient was seen by Dr. Barry at her home and was noticed to have increasing agitation as reported by the home health aid. Outpatient work up was started and she was noted to have WBC 13.9 (with neutrophilic predominance), elevated trop I, elevated CRP (40.4), low serum iron and was sent to the ED. Per health aid, patient has been increasingly agitated and "stubborn" over the past several weeks, and additionally not leaving bed including in order to urinate and has had a non-productive cough. She has had decreased po intake. She also has had loss of interest in wanting to do things she normally does, and has expressed that "she no longer wants to be in this world". Of note patient was previously on bupropion 450 mg q daily, but her former psychiatrist . Her new psychiatrist decreased this medication and then discontinued bupropion altogether which coincided with patients increased agitation. She was restarted on bupropion 450 mg last week, in addition to seroquel 25 mg in AM and 75 mg in PM. The health aid states she is oriented to self at baseline but will often ask the day of the week or the date and will often ask where she is, especially after she wakes up from a nap. Denied any other complaints.    In the ED  Vitals: T 97.5 HR 98 /73 RR 18 95% on RA   Labs: WBC WNL, Plt 435; .2  UA: + protein, + ketone, blood moderate, <5 RBC, <5 WBC   Imaging:  EKG: NSR with T wave inversion in V5 and V6  CXR: wet read: no major changes from 2019 CXR, possible trace r side effusion, no focal consolidation  ED Course: s/p 1L NS       PAST MEDICAL & SURGICAL HISTORY:  Breast cancer  Bilateral bunions  History of lymph node excision  in axilla, around left breast 40 years ago      Medications:  acetaminophen     Tablet .. 650 milliGRAM(s) Oral every 6 hours PRN  amLODIPine   Tablet 10 milliGRAM(s) Oral daily  atorvastatin 20 milliGRAM(s) Oral at bedtime  buPROPion XL (24-Hour) . 450 milliGRAM(s) Oral daily  enoxaparin Injectable 40 milliGRAM(s) SubCutaneous every 24 hours  FLUoxetine 10 milliGRAM(s) Oral daily  melatonin 3 milliGRAM(s) Oral at bedtime PRN  pantoprazole    Tablet 40 milliGRAM(s) Oral before breakfast  QUEtiapine 75 milliGRAM(s) Oral at bedtime      Vital Signs Last 24 Hrs  T(C): 36.9 (17 May 2022 21:18), Max: 37.1 (17 May 2022 15:09)  T(F): 98.5 (17 May 2022 21:18), Max: 98.7 (17 May 2022 15:09)  HR: 96 (17 May 2022 21:18) (94 - 98)  BP: 117/63 (17 May 2022 21:18) (117/63 - 164/91)  RR: 17 (17 May 2022 21:18) (17 - 19)  SpO2: 94% (17 May 2022 21:18) (93% - 95%)      Neurological Exam:   Mental status: Awake, alert and oriented x1 to self only. No dysarthria, no aphasia.   Cranial nerves: Pupils equally round and reactive to light, visual fields full, no nystagmus, extraocular muscles intact, V1 through V3 intact bilaterally and symmetric, face symmetric, hearing intact to finger rub, palate elevation symmetric, tongue was midline.  Motor: MRC grading 5/5 B/L UE/LE.  strength 5/5. Normal tone and bulk. No abnormal movements.    Sensation: Withdraws to pain, remainder of exam limited.  Coordination: Unable to assess due to patient's limited cooperation.  Reflexes: 2+ in bilateral UE/LE, downgoing toes bilaterally.  Gait: Deferred.      Labs:  CBC Full  -  ( 17 May 2022 11:39 )  WBC Count : 7.73 K/uL  RBC Count : 4.03 M/uL  Hemoglobin : 11.9 g/dL  Hematocrit : 37.0 %  Platelet Count - Automated : 435 K/uL  Mean Cell Volume : 91.8 fl  Mean Cell Hemoglobin : 29.5 pg  Mean Cell Hemoglobin Concentration : 32.2 gm/dL  Auto Neutrophil # : 5.86 K/uL  Auto Lymphocyte # : 0.98 K/uL  Auto Monocyte # : 0.71 K/uL  Auto Eosinophil # : 0.11 K/uL  Auto Basophil # : 0.05 K/uL  Auto Neutrophil % : 75.8 %  Auto Lymphocyte % : 12.7 %  Auto Monocyte % : 9.2 %  Auto Eosinophil % : 1.4 %  Auto Basophil % : 0.6 %        140  |  98  |  27<H>  ----------------------------<  119<H>  4.2   |  26  |  0.73    Ca    9.8      17 May 2022 11:39    TPro  7.7  /  Alb  3.6  /  TBili  0.2  /  DBili  x   /  AST  20  /  ALT  12  /  AlkPhos  114      LIVER FUNCTIONS - ( 17 May 2022 11:39 )  Alb: 3.6 g/dL / Pro: 7.7 g/dL / ALK PHOS: 114 U/L / ALT: 12 U/L / AST: 20 U/L / GGT: x             Urinalysis Basic - ( 17 May 2022 11:47 )    Color: Yellow / Appearance: Clear / SG: >=1.030 / pH: x  Gluc: x / Ketone: 40 mg/dL  / Bili: Small / Urobili: 0.2 E.U./dL   Blood: x / Protein: 30 mg/dL / Nitrite: NEGATIVE   Leuk Esterase: Trace / RBC: < 5 /HPF / WBC < 5 /HPF   Sq Epi: x / Non Sq Epi: 5-10 /HPF / Bacteria: Present /HPF      < from: CT Head No Cont (22 @ 12:11) >  PROCEDURE DATE:  2022          INTERPRETATION:  PROCEDURE: CT Head without contrast.    INDICATION: Altered mental status    TECHNIQUE: Multiple axial sections were obtained at 5 mm intervals. The   images were reviewed in brain and bone windows. Sagittal and coronal   reformations are provided.    COMPARISON: 10/12/2018    FINDINGS:    There is age-related parenchymal volume loss which is slightly increased   from the prior exam. No hydrocephalus, mass effect or midline shift.    No acute intracranial hemorrhage or extra-axial collection.    The gray white differentiation appears preserved without evidence of an   acute transcortical infarction. Confluent periventricular white matter   hypoattenuation reflects long-standing small vessel ischemic change; no   significant change.    The bony windows demonstrates no fractures. Mastoids appear clear. There   is opacification of the partially seen left maxillary sinus. Left ocular  lens implant appears malpositioned (axial image 1) but unchanged in   retrospect.      IMPRESSION:    No acute intracranial pathology.  Chronic and senescent changes, with mild increase in atrophy since 2018.   No hydrocephalus.  Malpositioned leftocular lens implant, though stable in retrospect.    --- End of Report ---              < end of copied text >

## 2022-05-18 NOTE — CHART NOTE - NSCHARTNOTEFT_GEN_A_CORE
Examined patient at bedside on attending rounds. Patient is in good spirits and says she feels well. During exam, patient asked questions about "what day it was" to her aide, but claims herself that she is "sharp as a tack". Psychiatry was at bedside examining patient and patient was very engaged in conversation. Family was also present and says that patient has been agitated and combative recently and that she has cognitively declined within the last 3 weeks. According to daughter, the patient has borderline and narcissistic personality disorder as well as dementia. Family inquiring about how to "cure" patient's condition.     Physical Exam:   Mental Status: Patient is AOx3 and communicating with psychiatrists at bedside in complete, eloquent sentences. Patient is using expressions, such as "it's time to smell the roses". Language is comprehensible.   Motor: Strength is 5/5 b/l UE and LE.   Sensation: Withdraws to pain, remainder of exam limited.  Reflexes: 2+ in bilateral UE/LE, downgoing toes bilaterally.  Gait: Deferred.    Assessment: 88F w/ PMHx breast cancer BIBA from home for abnormal labs in the setting of AMS. Patient was seen by Dr. Barry at her home and was noticed to be altered with increasing agitation as reported by the home health aid. Per health aid, patient has been increasingly agitated and "stubborn" over the past several weeks in the setting of elevated ESR/CRP. Likely due to age related cognitive changes and likely underlying dementia with recent agitation, for which seroquel was recently added. Likely due to age related cognitive changes and likely underlying dementia with recent agitation, for which seroquel was recently added. Patient was cooperative and less agitated this morning, and according to her family, she is a lot better today than she normally behaves.     Plan:  - CT Head with chronic changes with mild increase in atrophy since 2018  - No need for additional imaging at this time  - ESR 75,  were both elevated  - Would recommend Psych eval at this time  - Continue rest of care as per primary team    Thank you for this consult. Examined patient at bedside on attending rounds. Patient is in good spirits and says she feels well. During exam, patient asked questions about "what day it was" to her aide, but claims herself that she is "sharp as a tack". Psychiatry was at bedside examining patient and patient was very engaged in conversation. Family was also present and says that patient has been agitated and combative recently and that she has cognitively declined within the last 3 weeks. According to daughter, the patient has borderline and narcissistic personality disorder as well as dementia. Family inquiring about how to "cure" patient's condition.     Physical Exam:   Mental Status: Patient is AOx3 and communicating with psychiatrists at bedside in complete, eloquent sentences. Patient is using expressions, such as "it's time to smell the roses". Language is comprehensible.   Motor: Strength is 5/5 b/l UE and LE.   Sensation: Withdraws to pain, remainder of exam limited.  Reflexes: 2+ in bilateral UE/LE, downgoing toes bilaterally.  Gait: Deferred.    Assessment: 88F w/ PMHx breast cancer BIBA from home for abnormal labs in the setting of AMS. Patient was seen by Dr. Barry at her home and was noticed to be altered with increasing agitation as reported by the home health aid. Per health aid, patient has been increasingly agitated and "stubborn" over the past several weeks in the setting of elevated ESR/CRP. Likely due to age related cognitive changes and likely underlying dementia with recent agitation, for which seroquel was recently added. Likely due to age related cognitive changes and likely underlying dementia with recent agitation, for which seroquel was recently added. Patient was cooperative and less agitated this morning, and according to her family, she is a lot better today than she normally behaves.     Plan:  - CT Head with chronic changes with mild increase in atrophy since 2018  - No need for additional inpatient neurological imaging at this time  - ESR 75,  were both elevated  - Would recommend Psych eval at this time  - Continue rest of care as per primary team and psych    Thank you for this consult.

## 2022-05-18 NOTE — PROGRESS NOTE ADULT - SUBJECTIVE AND OBJECTIVE BOX
**INCOMPLETE NOTE    OVERNIGHT EVENTS:    SUBJECTIVE:  Patient seen and examined at bedside.    Vital Signs Last 12 Hrs  T(F): 98.4 (05-18-22 @ 05:11), Max: 98.5 (05-17-22 @ 21:18)  HR: 97 (05-18-22 @ 05:11) (96 - 97)  BP: 146/71 (05-18-22 @ 05:11) (117/63 - 146/71)  BP(mean): --  RR: 18 (05-18-22 @ 05:11) (17 - 18)  SpO2: 95% (05-18-22 @ 05:11) (94% - 95%)  I&O's Summary      PHYSICAL EXAM:  Constitutional: NAD, comfortable in bed.  HEENT: NC/AT, PERRLA, EOMI, no conjunctival pallor or scleral icterus, MMM  Neck: Supple, no JVD  Respiratory: CTA B/L. No w/r/r.   Cardiovascular: RRR, normal S1 and S2, no m/r/g.   Gastrointestinal: +BS, soft NTND, no guarding or rebound tenderness, no palpable masses   Extremities: wwp; no cyanosis, clubbing or edema.   Vascular: Pulses equal and strong throughout.   Neurological: AAOx3, no CN deficits, strength and sensation intact throughout.   Skin: No gross skin abnormalities or rashes        LABS:                        11.9   7.73  )-----------( 435      ( 17 May 2022 11:39 )             37.0     05-17    140  |  98  |  27<H>  ----------------------------<  119<H>  4.2   |  26  |  0.73    Ca    9.8      17 May 2022 11:39    TPro  7.7  /  Alb  3.6  /  TBili  0.2  /  DBili  x   /  AST  20  /  ALT  12  /  AlkPhos  114  05-17      Urinalysis Basic - ( 17 May 2022 11:47 )    Color: Yellow / Appearance: Clear / SG: >=1.030 / pH: x  Gluc: x / Ketone: 40 mg/dL  / Bili: Small / Urobili: 0.2 E.U./dL   Blood: x / Protein: 30 mg/dL / Nitrite: NEGATIVE   Leuk Esterase: Trace / RBC: < 5 /HPF / WBC < 5 /HPF   Sq Epi: x / Non Sq Epi: 5-10 /HPF / Bacteria: Present /HPF          RADIOLOGY & ADDITIONAL TESTS:    MEDICATIONS  (STANDING):  amLODIPine   Tablet 10 milliGRAM(s) Oral daily  atorvastatin 20 milliGRAM(s) Oral at bedtime  buPROPion XL (24-Hour) . 450 milliGRAM(s) Oral daily  enoxaparin Injectable 40 milliGRAM(s) SubCutaneous every 24 hours  FLUoxetine 10 milliGRAM(s) Oral daily  pantoprazole    Tablet 40 milliGRAM(s) Oral before breakfast  QUEtiapine 75 milliGRAM(s) Oral at bedtime    MEDICATIONS  (PRN):  acetaminophen     Tablet .. 650 milliGRAM(s) Oral every 6 hours PRN Temp greater or equal to 38C (100.4F), Mild Pain (1 - 3)  melatonin 3 milliGRAM(s) Oral at bedtime PRN Insomnia   **INCOMPLETE NOTE    OVERNIGHT EVENTS:    SUBJECTIVE:  Patient seen and examined at bedside. Pt was in no acute distress, very confused, not agitated, resting comfortably s/p Seroquel 75mg given previous night. Per internist Dr. Robert Barry (cell [phone]) he saw her in hospital last night and wanted to know the status of rheum consult. Per daughter, pt is following psych Dr. Colleen Mathews ([phone]) for mult diagnoses including narcissistic personality disorder, borderline personality disorder. No new complaints, denies chest pain, nausea, vomiting, chills, fever, abdominal pain.     Vital Signs Last 12 Hrs  T(F): 98.4 (05-18-22 @ 05:11), Max: 98.5 (05-17-22 @ 21:18)  HR: 97 (05-18-22 @ 05:11) (96 - 97)  BP: 146/71 (05-18-22 @ 05:11) (117/63 - 146/71)  BP(mean): --  RR: 18 (05-18-22 @ 05:11) (17 - 18)  SpO2: 95% (05-18-22 @ 05:11) (94% - 95%)  I&O's Summary      PHYSICAL EXAM:  Constitutional: NAD, comfortable in bed.  HEENT: NC/AT, PERRLA, EOMI, no conjunctival pallor or scleral icterus, MMM  Neck: Supple, no JVD  Respiratory: CTA B/L. No w/r/r.   Cardiovascular: RRR, normal S1 and S2, no m/r/g.   Gastrointestinal: +BS, soft NTND, no guarding or rebound tenderness, no palpable masses   Extremities: wwp; no cyanosis, clubbing or edema.   Vascular: Pulses equal and strong throughout.   Neurological: AAOx1, no CN deficits, strength and sensation intact throughout.   Skin: No gross skin abnormalities or rashes        LABS:                        11.9   7.73  )-----------( 435      ( 17 May 2022 11:39 )             37.0     05-17    140  |  98  |  27<H>  ----------------------------<  119<H>  4.2   |  26  |  0.73    Ca    9.8      17 May 2022 11:39    TPro  7.7  /  Alb  3.6  /  TBili  0.2  /  DBili  x   /  AST  20  /  ALT  12  /  AlkPhos  114  05-17      Urinalysis Basic - ( 17 May 2022 11:47 )    Color: Yellow / Appearance: Clear / SG: >=1.030 / pH: x  Gluc: x / Ketone: 40 mg/dL  / Bili: Small / Urobili: 0.2 E.U./dL   Blood: x / Protein: 30 mg/dL / Nitrite: NEGATIVE   Leuk Esterase: Trace / RBC: < 5 /HPF / WBC < 5 /HPF   Sq Epi: x / Non Sq Epi: 5-10 /HPF / Bacteria: Present /HPF          RADIOLOGY & ADDITIONAL TESTS:    MEDICATIONS  (STANDING):  amLODIPine   Tablet 10 milliGRAM(s) Oral daily  atorvastatin 20 milliGRAM(s) Oral at bedtime  buPROPion XL (24-Hour) . 450 milliGRAM(s) Oral daily  enoxaparin Injectable 40 milliGRAM(s) SubCutaneous every 24 hours  FLUoxetine 10 milliGRAM(s) Oral daily  pantoprazole    Tablet 40 milliGRAM(s) Oral before breakfast  QUEtiapine 75 milliGRAM(s) Oral at bedtime    MEDICATIONS  (PRN):  acetaminophen     Tablet .. 650 milliGRAM(s) Oral every 6 hours PRN Temp greater or equal to 38C (100.4F), Mild Pain (1 - 3)  melatonin 3 milliGRAM(s) Oral at bedtime PRN Insomnia   OVERNIGHT EVENTS: DELIA    SUBJECTIVE:  Patient seen and examined at bedside. Pt was in no acute distress, very confused, not agitated, resting comfortably s/p Seroquel 75mg given previous night. Per internist Dr. Robert Barry (cell [phone]) he saw her in hospital last night and wanted to know the status of rheum consult. Per daughter, pt is following psych Dr. Colleen Mathews ([phone]) for mult diagnoses including narcissistic personality disorder, borderline personality disorder. No new complaints, denies chest pain, nausea, vomiting, chills, fever, abdominal pain.     Vital Signs Last 12 Hrs  T(F): 98.4 (05-18-22 @ 05:11), Max: 98.5 (05-17-22 @ 21:18)  HR: 97 (05-18-22 @ 05:11) (96 - 97)  BP: 146/71 (05-18-22 @ 05:11) (117/63 - 146/71)  BP(mean): --  RR: 18 (05-18-22 @ 05:11) (17 - 18)  SpO2: 95% (05-18-22 @ 05:11) (94% - 95%)  I&O's Summary      PHYSICAL EXAM:  Constitutional: NAD, comfortable in bed.  HEENT: NC/AT, PERRLA, EOMI, no conjunctival pallor or scleral icterus, MMM  Neck: Supple, no JVD  Respiratory: CTA B/L. No w/r/r.   Cardiovascular: RRR, normal S1 and S2, no m/r/g.   Gastrointestinal: +BS, soft NTND, no guarding or rebound tenderness, no palpable masses   Extremities: wwp; no cyanosis, clubbing or edema.   Vascular: Pulses equal and strong throughout.   Neurological: AAOx1, no CN deficits, strength and sensation intact throughout.   Skin: No gross skin abnormalities or rashes        LABS:                        11.9   7.73  )-----------( 435      ( 17 May 2022 11:39 )             37.0     05-17    140  |  98  |  27<H>  ----------------------------<  119<H>  4.2   |  26  |  0.73    Ca    9.8      17 May 2022 11:39    TPro  7.7  /  Alb  3.6  /  TBili  0.2  /  DBili  x   /  AST  20  /  ALT  12  /  AlkPhos  114  05-17      Urinalysis Basic - ( 17 May 2022 11:47 )    Color: Yellow / Appearance: Clear / SG: >=1.030 / pH: x  Gluc: x / Ketone: 40 mg/dL  / Bili: Small / Urobili: 0.2 E.U./dL   Blood: x / Protein: 30 mg/dL / Nitrite: NEGATIVE   Leuk Esterase: Trace / RBC: < 5 /HPF / WBC < 5 /HPF   Sq Epi: x / Non Sq Epi: 5-10 /HPF / Bacteria: Present /HPF          RADIOLOGY & ADDITIONAL TESTS:    MEDICATIONS  (STANDING):  amLODIPine   Tablet 10 milliGRAM(s) Oral daily  atorvastatin 20 milliGRAM(s) Oral at bedtime  buPROPion XL (24-Hour) . 450 milliGRAM(s) Oral daily  enoxaparin Injectable 40 milliGRAM(s) SubCutaneous every 24 hours  FLUoxetine 10 milliGRAM(s) Oral daily  pantoprazole    Tablet 40 milliGRAM(s) Oral before breakfast  QUEtiapine 75 milliGRAM(s) Oral at bedtime    MEDICATIONS  (PRN):  acetaminophen     Tablet .. 650 milliGRAM(s) Oral every 6 hours PRN Temp greater or equal to 38C (100.4F), Mild Pain (1 - 3)  melatonin 3 milliGRAM(s) Oral at bedtime PRN Insomnia

## 2022-05-18 NOTE — PROGRESS NOTE ADULT - SUBJECTIVE AND OBJECTIVE BOX
I have spoken with and reveiwed the consultations of today:  -psych in agreement with seroquel but stop xananx  -rheum recommending temp art biopsy to rule out temporal arteritis (consult azizalaanne bain)  -neuro - no acute issues                          10.4   7.42  )-----------( 433      ( 18 May 2022 09:10 )             32.3   05-18    140  |  100  |  20  ----------------------------<  109<H>  4.0   |  24  |  0.70    Ca    9.6      18 May 2022 09:10  Phos  3.9     05-18  Mg     2.0     05-18    TPro  7.7  /  Alb  3.6  /  TBili  0.2  /  DBili  x   /  AST  20  /  ALT  12  /  AlkPhos  114  05-17    ESR >130
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
## 2022-05-18 NOTE — BH CONSULTATION LIAISON ASSESSMENT NOTE - CURRENT MEDICATION
MEDICATIONS  (STANDING):  amLODIPine   Tablet 10 milliGRAM(s) Oral daily  atorvastatin 20 milliGRAM(s) Oral at bedtime  buPROPion XL (24-Hour) . 450 milliGRAM(s) Oral daily  enoxaparin Injectable 40 milliGRAM(s) SubCutaneous every 24 hours  FLUoxetine 10 milliGRAM(s) Oral daily  pantoprazole    Tablet 40 milliGRAM(s) Oral before breakfast  QUEtiapine 75 milliGRAM(s) Oral at bedtime    MEDICATIONS  (PRN):  acetaminophen     Tablet .. 650 milliGRAM(s) Oral every 6 hours PRN Temp greater or equal to 38C (100.4F), Mild Pain (1 - 3)  melatonin 3 milliGRAM(s) Oral at bedtime PRN Insomnia

## 2022-05-18 NOTE — BH CONSULTATION LIAISON ASSESSMENT NOTE - NSBHMSEKNOW_PSY_A_CORE
Length of session: 50 minutes     Began meeting as a family today with Darío and his mom and 2 younger brothers. Darío talked positively about the end of his fifth grade school year. Mom also confirms this. Discussed how the family has been quite busy with summer activities. Darío's involved in soccer and is on 2 different soccer leagues. He shares that last week he scored a goal. The family also talked positively about upcoming vacations in July and August.    Primarily addressed throughout today's appointment struggles with sibling relationship issues. Mom shares that Darío and his younger brother Ronald are very competitive with one another. In fact, I observed this between the 2 of them during today's appointment. Focused on how Ronald makes very negative statements about Darío. Mom also states that Ronald will attempt to get Hernando, their 3-year-old brother, on his side and kind of gang up on Darío. Mom states that she sees Darío trying to get back at them and essentially retaliate. Darío verbalizes that he has to do this because Ronald is \"annoying\".    Focused on the reality with Darío that it is up to adults will handle Ronald's behaviors and ultimately he cannot control his brother. Focused on what he has control over is his response to Ronald. Discussed ways to not \"bite on the hook\" when Ronald is \"fishing for him\". Mom does complement Darío recently as he made a very kind statement to Ronald about his baseball skills. Tried to focus on normal sibling struggles and challenges but also ways as brothers to build each other up and be kind. Darío talked about how he should not lie and therefore if he has a negative thought about his brother he should be honest in saying it. Focused on the concept of: if you do not have anything nice to say do not say anything at all.    Also addressed peer issues and challenges that Darío brings up recently with some kids that live in his dad's neighborhood. Focused on  the changes that Darío is going to deal with when he transitions to middle school this fall.       Mom reports no medication changes or concerns.  Darío denies any issues with suicidal or homicidal ideation or self abusive behaviors.             Assessment:  Adjustment disorder w/ mixed disturbance of emotions and conduct  Adhd, combined type      Plan: Continue to focus on sibling issues and frustration/anger management. Continue to proactively talk about changes with middle school starting.      Zoë Mckinnon, LPC       Normal
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
## 2022-05-18 NOTE — PROGRESS NOTE ADULT - PROBLEM SELECTOR PLAN 1
Patient presenting with increased agitation, decreased PO intake over past week, i/s/o dementia. Patient with outpatient labs showing elevated WBC with neutrophilic predominance, increased CRP, decreased ferritin, increased troponin I. Admission labs today with CRP of 217 from 40 outpatient. CT head with age related changes no acute pathology. Patient is very agitated on exam, AAO x3 but forgetful. Home medication: fluoxetine 20 mg q daily, bupropion 450 mg q daily, seroquel 25mg in AM and 75 mg in PM, alprazolam 0.5 mg in PM and trazodone 50 mg in PM.     - rheum consulted, f/u recs  - neuro consulted, f/u recs  - psych consult in AM   - f/u syphilis  - f/u ESR,CRP  - f/u PAU, RF, anca  - f/u blood cultures   - c/w home medications Patient presenting with increased agitation, decreased PO intake over past week, i/s/o dementia. Patient with outpatient labs showing elevated WBC with neutrophilic predominance, increased CRP, decreased ferritin, increased troponin I. Admission labs today with CRP of 217 from 40 outpatient. CT head with age related changes no acute pathology. Patient is very agitated on exam, AAO x3 but forgetful. Home medication: fluoxetine 20 mg q daily, bupropion 450 mg q daily, seroquel 25mg in AM and 75 mg in PM, alprazolam 0.5 mg in PM and trazodone 50 mg in PM.     - rheum consulted, f/u recs  - neuro recommends f/u w psych  - psych consult in AM   - f/u syphilis  - f/u ESR,CRP  - f/u PAU, RF, anca  - f/u blood cultures   - c/w home medications Patient presenting with increased agitation and decreased PO intake over past week i/s/o dementia. History of increased aggression and cognitive decline over past month. Patient with outpatient labs showing elevated WBC with neutrophilic predominance, increased CRP, decreased ferritin, increased troponin I. Admission labs today with CRP of 144 from 217 the previous day. ESR of >130. CT head with age related changes no acute pathology. Awaiting rheum and psych consults. Patient is slightly agitated on exam, AAO x1, forgetful. Home medication: fluoxetine 20 mg q daily, bupropion 450 mg q daily, seroquel 25mg in AM and 75 mg in PM, alprazolam 0.5 mg in PM and trazodone 50 mg in PM.     - rheum consulted, f/u recs  - neuro recommends f/u w psych  - psych consult in afternoon  - f/u syphilis  - ESR >130, .3  - f/u PAU, RF, anca  - f/u blood cultures, NGTD as of 17 May 22   - c/w home medications Patient presenting with increased agitation and decreased PO intake over past week i/s/o dementia. History of increased aggression and cognitive decline over past month. Patient with outpatient labs showing elevated WBC with neutrophilic predominance, increased CRP, decreased ferritin, increased troponin I. Admission labs today with CRP of 144 from 217 the previous day. ESR of >130. CT head with age related changes no acute pathology. Awaiting rheum and psych consults. Patient is slightly agitated on exam, AAO x1, forgetful. Home medication: fluoxetine 20 mg q daily, bupropion 450 mg q daily, seroquel 25mg in AM and 75 mg in PM, alprazolam 0.5 mg in PM and trazodone 50 mg in PM.   - rheum recommends temporal artery biopsy  - neuro recommends f/u w psych  - psych consult in afternoon  - f/u syphilis  - ESR >130, .3  - f/u PAU, RF, anca  - f/u blood cultures, NGTD as of 17 May 22   - c/w home medications

## 2022-05-18 NOTE — BH CONSULTATION LIAISON ASSESSMENT NOTE - NSBHCHARTREVIEWVS_PSY_A_CORE FT
Vital Signs Last 24 Hrs  T(C): 37.4 (18 May 2022 09:43), Max: 37.4 (18 May 2022 09:43)  T(F): 99.3 (18 May 2022 09:43), Max: 99.3 (18 May 2022 09:43)  HR: 100 (18 May 2022 09:43) (96 - 100)  BP: 139/85 (18 May 2022 09:43) (117/63 - 164/91)  BP(mean): --  RR: 18 (18 May 2022 09:43) (17 - 18)  SpO2: 95% (18 May 2022 09:43) (93% - 95%)

## 2022-05-18 NOTE — BH CONSULTATION LIAISON ASSESSMENT NOTE - RISK ASSESSMENT
Pt is at low acute risk  Chronic risk factors include decline in functional status, hx/o depression, chronic SI  Pt however has good support. She denies SI. Pt is future oriented.

## 2022-05-18 NOTE — CONSULT NOTE ADULT - ATTENDING COMMENTS
patient seen and examined - discussed with resident / attending and Psychiatry - elderly patient with unexplained ESR / CRP worsening dementia - no localizing disease process or infection - temporalarteritis - although rare could explain process while risks of steroids might be significant would suggest Right sided temporal artery biopy to evaluate this consideration .

## 2022-05-18 NOTE — CONSULT NOTE ADULT - SUBJECTIVE AND OBJECTIVE BOX
HPI:  Patient is an 87 y/o F PMHx mild dementia, breast cancer BIBA from home for abnormal labs i/s/o AMS. Patient was seen by Dr. Barry at in the home and was noticed to be altered with agitation. Outpatient work up was started and she was noted to have WBC 13.9 (with neutrophilic predominance), elevated trop I, elevated CRP (40.4), low serum iron and sent her to ED. Per health aide and long , patient has been increasingly agitated over the past week, and additionally not leaving bed including in order to urinate and has had a non-productive cough. She has had decreased PO intake.  Denies chest pain, SOB, fever, cough, headache. Of note patient was previously on bupropion 450 mg q daily, her former psychiatrist . Her new psychiatrist decreased and then discontinued bupropion which coincided with patients increased agitation. She was restarted on bupropion 450 mg last week, in addition to seroquel 25 mg in AM and 75 mg in PM.     In the ED,  Vitals: T 97.5 HR 98 /73 RR 18 95% on RA   Labs: WBC WNL, plt 435; .2    UA: + protein, + ketone, blood moderate, <5 RBC, <5 WBC   Imaging:    EKG: NSR with T wave inversion in V5 and V6    CXR: wet read: no major changes from 2019 CXR, possible trace r side effusion, no focal consolidation  ED Course: s/p 1L NS    (17 May 2022 14:06)      ADDITIONAL ID HPI:    Per discussion with PMD and daughter, patient does not have history of multiple sclerosis. There is no family history of rheumatologic disease known to daughter and , however patient's brother  at age 20 due to unknown colitis. Per PMD, patient had normal CRP of ~6 in 2021, which has elevated to 40.4 on outpatient labs > 217.2 on admission > 144.3 (). ESR 8 on outpatient labs > 75 on admission > >130 ().     PAST MEDICAL & SURGICAL HISTORY:  Breast cancer      Bilateral bunions      History of lymph node excision  in axilla, around left breast 40 years ago          Home Medications:  amLODIPine 10 mg oral tablet: 1 tab(s) orally once a day (17 May 2022 15:55)  armodafinil 250 mg oral tablet: 1 tab(s) orally once a day (17 May 2022 15:55)  buPROPion 450 mg/24 hours (XL) oral tablet, extended release: 1 tab(s) orally every 24 hours (17 May 2022 15:55)  Dexilant 60 mg oral delayed release capsule: 1 cap(s) orally once a day (17 May 2022 15:55)  FLUoxetine 10 mg oral capsule: 1 cap(s) orally once a day (17 May 2022 15:55)  rosuvastatin 5 mg oral tablet: 1 tab(s) orally once a day (17 May 2022 15:55)  traZODone 50 mg oral tablet: orally once a day (at bedtime) (17 May 2022 15:55)  Xanax 0.5 mg oral tablet: orally once a day (at bedtime) (17 May 2022 15:55)      OTHER MEDICATIONS:  acetaminophen     Tablet .. 650 milliGRAM(s) Oral every 6 hours PRN  amLODIPine   Tablet 10 milliGRAM(s) Oral daily  atorvastatin 20 milliGRAM(s) Oral at bedtime  buPROPion XL (24-Hour) . 450 milliGRAM(s) Oral daily  enoxaparin Injectable 40 milliGRAM(s) SubCutaneous every 24 hours  FLUoxetine 10 milliGRAM(s) Oral daily  melatonin 3 milliGRAM(s) Oral at bedtime PRN  pantoprazole    Tablet 40 milliGRAM(s) Oral before breakfast  QUEtiapine 75 milliGRAM(s) Oral at bedtime      ALLERGIES:    Allergies    No Known Allergies    Intolerances        FAMILY HISTORY:  Known health problems: none        Social Hx:  Social History:  Former "social" smoker, smoked for approximately 10 years; quit 30 years ago. Drinks 1 glass of wine daily. No drug use. Lives alone with 24 HHA. (17 May 2022 14:06)      ROS:    VITAL SIGNS:  Vital Signs Last 24 Hrs  T(C): 37.4 (18 May 2022 09:43), Max: 37.4 (18 May 2022 09:43)  T(F): 99.3 (18 May 2022 09:43), Max: 99.3 (18 May 2022 09:43)  HR: 100 (18 May 2022 09:43) (94 - 100)  BP: 139/85 (18 May 2022 09:43) (117/63 - 164/91)  BP(mean): --  RR: 18 (18 May 2022 09:43) (17 - 19)  SpO2: 95% (18 May 2022 09:43) (93% - 95%)    PHYSICAL EXAM:  General: in NAD, lying comfortably in bed  HEENT: normocephalic, atraumatic; PERRL, anicteric sclera; MMM  Neck: supple, no JVD, no thyromegaly, no lymphadenopathy  Cardiovascular: +S1/S2, RRR, no M/G/R  Respiratory: clear to auscultation B/L; no wheezing, no rales, no rhonchi  Gastrointestinal: soft, NT/ND; +BSx4, no organomegaly  Extremities: WWP; no edema, clubbing or cyanosis  Vascular: 2+ radial, DP/PT pulses B/L  Neurological: AAOx3; no focal deficits    LABS:                        10.4   7.42  )-----------( 433      ( 18 May 2022 09:10 )             32.3     05-18    140  |  100  |  20  ----------------------------<  109<H>  4.0   |  24  |  0.70    Ca    9.6      18 May 2022 09:10  Phos  3.9     05-18  Mg     2.0     05-18    TPro  7.7  /  Alb  3.6  /  TBili  0.2  /  DBili  x   /  AST  20  /  ALT  12  /  AlkPhos  114  05-17      Urinalysis Basic - ( 17 May 2022 11:47 )    Color: Yellow / Appearance: Clear / SG: >=1.030 / pH: x  Gluc: x / Ketone: 40 mg/dL  / Bili: Small / Urobili: 0.2 E.U./dL   Blood: x / Protein: 30 mg/dL / Nitrite: NEGATIVE   Leuk Esterase: Trace / RBC: < 5 /HPF / WBC < 5 /HPF   Sq Epi: x / Non Sq Epi: 5-10 /HPF / Bacteria: Present /HPF        RADIOLOGY & ADDITIONAL STUDIES:   HPI:  Patient is an 89 y/o F PMHx mild dementia, breast cancer BIBA from home for abnormal labs i/s/o AMS. Patient was seen by Dr. Barry at in the home and was noticed to be altered with agitation. Outpatient work up was started and she was noted to have WBC 13.9 (with neutrophilic predominance), elevated trop I, elevated CRP (40.4), low serum iron and sent her to ED. Per health aide and long , patient has been increasingly agitated over the past week, and additionally not leaving bed including in order to urinate and has had a non-productive cough. She has had decreased PO intake.  Denies chest pain, SOB, fever, cough, headache. Of note patient was previously on bupropion 450 mg q daily, her former psychiatrist . Her new psychiatrist decreased and then discontinued bupropion which coincided with patients increased agitation. She was restarted on bupropion 450 mg last week, in addition to seroquel 25 mg in AM and 75 mg in PM.     In the ED,  Vitals: T 97.5 HR 98 /73 RR 18 95% on RA   Labs: WBC WNL, plt 435; .2    UA: + protein, + ketone, blood moderate, <5 RBC, <5 WBC   Imaging:    EKG: NSR with T wave inversion in V5 and V6    CXR: wet read: no major changes from 2019 CXR, possible trace r side effusion, no focal consolidation  ED Course: s/p 1L NS    (17 May 2022 14:06)      ADDITIONAL ID HPI:    Patient feels well today, and says she "feels at peace" which "is unusual for me." Per discussion with PMD and daughter, patient does not have history of multiple sclerosis. There is no family history of rheumatologic disease known to daughter and , however patient's brother  at age 20 due to "colitis." Patient's brother was sick with "colitis" for 4 years prior to his passing. Patient states that she has been active and exercises everyday when she young up until "a few weeks ago." She states history of hip and elbow fracture however has never had issues when younger with joint pain or joint inflammation. Per PMD, patient had normal CRP of ~6 in 2021, which has elevated to 40.4 on outpatient labs > 217.2 on admission > 144.3 (5/). ESR 8 on outpatient labs > 75 on admission > >130 ().     PAST MEDICAL & SURGICAL HISTORY:  Breast cancer      Bilateral bunions      History of lymph node excision  in axilla, around left breast 40 years ago          Home Medications:  amLODIPine 10 mg oral tablet: 1 tab(s) orally once a day (17 May 2022 15:55)  armodafinil 250 mg oral tablet: 1 tab(s) orally once a day (17 May 2022 15:55)  buPROPion 450 mg/24 hours (XL) oral tablet, extended release: 1 tab(s) orally every 24 hours (17 May 2022 15:55)  Dexilant 60 mg oral delayed release capsule: 1 cap(s) orally once a day (17 May 2022 15:55)  FLUoxetine 10 mg oral capsule: 1 cap(s) orally once a day (17 May 2022 15:55)  rosuvastatin 5 mg oral tablet: 1 tab(s) orally once a day (17 May 2022 15:55)  traZODone 50 mg oral tablet: orally once a day (at bedtime) (17 May 2022 15:55)  Xanax 0.5 mg oral tablet: orally once a day (at bedtime) (17 May 2022 15:55)      OTHER MEDICATIONS:  acetaminophen     Tablet .. 650 milliGRAM(s) Oral every 6 hours PRN  amLODIPine   Tablet 10 milliGRAM(s) Oral daily  atorvastatin 20 milliGRAM(s) Oral at bedtime  buPROPion XL (24-Hour) . 450 milliGRAM(s) Oral daily  enoxaparin Injectable 40 milliGRAM(s) SubCutaneous every 24 hours  FLUoxetine 10 milliGRAM(s) Oral daily  melatonin 3 milliGRAM(s) Oral at bedtime PRN  pantoprazole    Tablet 40 milliGRAM(s) Oral before breakfast  QUEtiapine 75 milliGRAM(s) Oral at bedtime      ALLERGIES:    Allergies    No Known Allergies    Intolerances        FAMILY HISTORY:  Known health problems: none        Social Hx:  Social History:  Former "social" smoker, smoked for approximately 10 years; quit 30 years ago. Drinks 1 glass of wine daily. No drug use. Lives alone with 24 HHA. (17 May 2022 14:06)      ROS:    VITAL SIGNS:  Vital Signs Last 24 Hrs  T(C): 37.4 (18 May 2022 09:43), Max: 37.4 (18 May 2022 09:43)  T(F): 99.3 (18 May 2022 09:43), Max: 99.3 (18 May 2022 09:43)  HR: 100 (18 May 2022 09:43) (94 - 100)  BP: 139/85 (18 May 2022 09:43) (117/63 - 164/91)  BP(mean): --  RR: 18 (18 May 2022 09:43) (17 - 19)  SpO2: 95% (18 May 2022 09:43) (93% - 95%)    PHYSICAL EXAM:  General: in NAD, lying comfortably in bed  HEENT: normocephalic, atraumatic; PERRL, anicteric sclera; MMM, no lesions or erythema in mouth, no pain in b/l temporal area. Visual fields intact.   Neck: supple, no JVD, no thyromegaly,   Cardiovascular: +S1/S2, RRR, 2/4 systolic murmur at LSB  Respiratory: clear to auscultation B/L; no wheezing, no rales, no rhonchi  Gastrointestinal: soft, NT/ND; +BSx4, no organomegaly  Extremities: WWP; no edema, clubbing or cyanosis  Vascular: 1+ radial, DP/PT pulses B/L  Neurological: AAOx3; no focal deficits  MSK: no erythema or swelling or TTP of the joints, patient able to move all extremities. passive FROM, active ROM limited in RUE by pain near IV site.     LABS:                        10.4   7.42  )-----------( 433      ( 18 May 2022 09:10 )             32.3     05-18    140  |  100  |  20  ----------------------------<  109<H>  4.0   |  24  |  0.70    Ca    9.6      18 May 2022 09:10  Phos  3.9     05-18  Mg     2.0     -    TPro  7.7  /  Alb  3.6  /  TBili  0.2  /  DBili  x   /  AST  20  /  ALT  12  /  AlkPhos  114  05-17      Urinalysis Basic - ( 17 May 2022 11:47 )    Color: Yellow / Appearance: Clear / SG: >=1.030 / pH: x  Gluc: x / Ketone: 40 mg/dL  / Bili: Small / Urobili: 0.2 E.U./dL   Blood: x / Protein: 30 mg/dL / Nitrite: NEGATIVE   Leuk Esterase: Trace / RBC: < 5 /HPF / WBC < 5 /HPF   Sq Epi: x / Non Sq Epi: 5-10 /HPF / Bacteria: Present /HPF        RADIOLOGY & ADDITIONAL STUDIES:   HPI:  Patient is an 87 y/o F PMHx mild dementia, breast cancer BIBA from home for abnormal labs i/s/o AMS. Patient was seen by Dr. Barry at in the home and was noticed to be altered with agitation. Outpatient work up was started and she was noted to have WBC 13.9 (with neutrophilic predominance), elevated trop I, elevated CRP (40.4), low serum iron and sent her to ED. Per health aide and long , patient has been increasingly agitated over the past week, and additionally not leaving bed including in order to urinate and has had a non-productive cough. She has had decreased PO intake.  Denies chest pain, SOB, fever, cough, headache. Of note patient was previously on bupropion 450 mg q daily, her former psychiatrist . Her new psychiatrist decreased and then discontinued bupropion which coincided with patients increased agitation. She was restarted on bupropion 450 mg last week, in addition to seroquel 25 mg in AM and 75 mg in PM.     In the ED,  Vitals: T 97.5 HR 98 /73 RR 18 95% on RA   Labs: WBC WNL, plt 435; .2    UA: + protein, + ketone, blood moderate, <5 RBC, <5 WBC   Imaging:    EKG: NSR with T wave inversion in V5 and V6    CXR: wet read: no major changes from 2019 CXR, possible trace r side effusion, no focal consolidation  ED Course: s/p 1L NS    (17 May 2022 14:06)      ADDITIONAL ID HPI:    Patient feels well today, and says she "feels at peace" which "is unusual for me." Per discussion with PMD and daughter, patient does not have history of multiple sclerosis. There is no family history of rheumatologic disease known to daughter and , however patient's brother  at age 20 due to "colitis." Patient's brother was sick with "colitis" for 4 years prior to his passing. Patient states that she has been active and exercises everyday when she young up until "a few weeks ago." She states history of hip and elbow fracture however has never had issues when younger with joint pain or joint inflammation. Per PMD, patient had normal CRP of ~6 in 2021, which has elevated to 40.4 on outpatient labs > 217.2 on admission > 144.3 (). ESR 8 on outpatient labs > 75 on admission > >130 (). RF WNL. Patient denies pain with mastication, no difficulty with activities such as combing hair.     PAST MEDICAL & SURGICAL HISTORY:  Breast cancer      Bilateral bunions      History of lymph node excision  in axilla, around left breast 40 years ago          Home Medications:  amLODIPine 10 mg oral tablet: 1 tab(s) orally once a day (17 May 2022 15:55)  armodafinil 250 mg oral tablet: 1 tab(s) orally once a day (17 May 2022 15:55)  buPROPion 450 mg/24 hours (XL) oral tablet, extended release: 1 tab(s) orally every 24 hours (17 May 2022 15:55)  Dexilant 60 mg oral delayed release capsule: 1 cap(s) orally once a day (17 May 2022 15:55)  FLUoxetine 10 mg oral capsule: 1 cap(s) orally once a day (17 May 2022 15:55)  rosuvastatin 5 mg oral tablet: 1 tab(s) orally once a day (17 May 2022 15:55)  traZODone 50 mg oral tablet: orally once a day (at bedtime) (17 May 2022 15:55)  Xanax 0.5 mg oral tablet: orally once a day (at bedtime) (17 May 2022 15:55)      OTHER MEDICATIONS:  acetaminophen     Tablet .. 650 milliGRAM(s) Oral every 6 hours PRN  amLODIPine   Tablet 10 milliGRAM(s) Oral daily  atorvastatin 20 milliGRAM(s) Oral at bedtime  buPROPion XL (24-Hour) . 450 milliGRAM(s) Oral daily  enoxaparin Injectable 40 milliGRAM(s) SubCutaneous every 24 hours  FLUoxetine 10 milliGRAM(s) Oral daily  melatonin 3 milliGRAM(s) Oral at bedtime PRN  pantoprazole    Tablet 40 milliGRAM(s) Oral before breakfast  QUEtiapine 75 milliGRAM(s) Oral at bedtime      ALLERGIES:    Allergies    No Known Allergies    Intolerances        FAMILY HISTORY:  Known health problems: none        Social Hx:  Social History:  Former "social" smoker, smoked for approximately 10 years; quit 30 years ago. Drinks 1 glass of wine daily. No drug use. Lives alone with 24 HHA. (17 May 2022 14:06)      ROS:    VITAL SIGNS:  Vital Signs Last 24 Hrs  T(C): 37.4 (18 May 2022 09:43), Max: 37.4 (18 May 2022 09:43)  T(F): 99.3 (18 May 2022 09:43), Max: 99.3 (18 May 2022 09:43)  HR: 100 (18 May 2022 09:43) (94 - 100)  BP: 139/85 (18 May 2022 09:43) (117/63 - 164/91)  BP(mean): --  RR: 18 (18 May 2022 09:43) (17 - 19)  SpO2: 95% (18 May 2022 09:43) (93% - 95%)    PHYSICAL EXAM:  General: in NAD, lying comfortably in bed  HEENT: normocephalic, atraumatic; PERRL, anicteric sclera; MMM, no lesions or erythema in mouth, no TTP in b/l temporal area. Visual fields intact.   Neck: supple, no JVD, no thyromegaly,   Cardiovascular: +S1/S2, RRR, 2/4 systolic murmur at LSB  Respiratory: clear to auscultation B/L; no wheezing, no rales, no rhonchi  Gastrointestinal: soft, NT/ND; +BSx4, no organomegaly  Extremities: WWP; no edema, clubbing or cyanosis  Vascular: 1+ radial, DP/PT pulses B/L; non-tender temporal artery pulses b/l. pulses intact in b/l temporal artery without nodularity.   Neurological: AAOx3; no focal deficits  MSK: no erythema or swelling or TTP of the joints, patient able to move all extremities. passive FROM, active ROM limited in RUE by pain near IV site.     LABS:                        10.4   7.42  )-----------( 433      ( 18 May 2022 09:10 )             32.3     05-18    140  |  100  |  20  ----------------------------<  109<H>  4.0   |  24  |  0.70    Ca    9.6      18 May 2022 09:10  Phos  3.9     05-18  Mg     2.0     05-18    TPro  7.7  /  Alb  3.6  /  TBili  0.2  /  DBili  x   /  AST  20  /  ALT  12  /  AlkPhos  114  05-17      Urinalysis Basic - ( 17 May 2022 11:47 )    Color: Yellow / Appearance: Clear / SG: >=1.030 / pH: x  Gluc: x / Ketone: 40 mg/dL  / Bili: Small / Urobili: 0.2 E.U./dL   Blood: x / Protein: 30 mg/dL / Nitrite: NEGATIVE   Leuk Esterase: Trace / RBC: < 5 /HPF / WBC < 5 /HPF   Sq Epi: x / Non Sq Epi: 5-10 /HPF / Bacteria: Present /HPF        RADIOLOGY & ADDITIONAL STUDIES:

## 2022-05-18 NOTE — BH CONSULTATION LIAISON ASSESSMENT NOTE - SUMMARY
Pt is an 87 yo Pakistani  X2 female domiciled at home with 24h home health aides with PMHx of mild dementia, breast CA s/p resection at [facility], PPHx per daughter of "Borderline Personality D/o, Narcissistic Personality  D/o", depression and anxiety, no prior SA's or psychiatric hospitalizations, who was BIBA from her PCP for abn labs.     As per collateral information pt with hx/o memory decline, worsening over the past two months, most recently with increasing agitation/physical aggression towards aids for the past 2 weeks. On current exam pt is currently calm and cooperative, exhibiting mild memory deficit. There is no evidence of acute agitation, acute mood or psychotic symptoms. Medical etiology of acute change in MS needs to be ruled out.     Other potential etiologies include progression of neurocognitive disease, exacerbation of depression (although pt denies recent depressive symptoms), iatrogenic (pt's psychiatric medications were adjusted recently).     Plan:  -d/c Xanax as it may be contributing to pt's memory decline/confusion. Continue to discuss with pt minimizing ETOH intake  -Continue Wellbutrin and Prozac at current doses  -Seroquel 25 mg q am and 50mg qhs  -Seroquel 25 mg qday prn agitation  -Provide frequent orientation clues.

## 2022-05-18 NOTE — CONSULT NOTE ADULT - ASSESSMENT
Patient is a 87 y/o F PMHx mild dementia, breast cancer BIBA from home for abnormal labs i/s/o AMS. Rheumatology has been consulted for evaluation of AMS with newly elevated CRP originally, now additionally found to have elevated ESR. CRP 6 (2021) > 40.4 (5/15/2022) > 217.2 () > 144.3 (). ESR 8 (5/15) > 75 () > >130 (). Patient has possible family history of rheumatologic colitis in her  brother. GCA is a reversible cause of dementia    Recommendations:   - f/u PAU, anca, RF  - continue to trend ESR/CRP  -  Patient is a 89 y/o F PMHx mild dementia, breast cancer BIBA from home for abnormal labs i/s/o AMS. Rheumatology has been consulted for evaluation of AMS with newly elevated CRP, additionally found to have elevated ESR. CRP 6 (9/2021) > 40.4 (5/15/2022) > 217.2 (5/17) > 144.3 (5/18). ESR 8 (5/15) > 75 (5/17) > >130 (5/18).     Impression: Elderly woman worsening dementia with marked elevation of ESR and CRP both normal back in september. No localizing signs of infection. No definite clinical evidence of temporal arteritis however this could explain elevation of CRP and ESR and worsening dementia. Risks of steroids in her is significant with likely osteoporosis and could also potentially worsen mental status therefore, would consider right-sided temporal artery biopsy which should exclude this consideration. If biopsy is positive, would start prednisone. Discussed with Dr. Barry and psychiatry.     Recommendations:   - f/u PAU, anca,   - f/u RVP   - as above, consider right-sided temporal artery biopsy

## 2022-05-19 NOTE — PROGRESS NOTE ADULT - SUBJECTIVE AND OBJECTIVE BOX
OVERNIGHT EVENTS: lali    SUBJECTIVE:  Patient seen and examined at bedside. nac.     Vital Signs Last 12 Hrs  T(F): 98.2 (05-19-22 @ 10:07), Max: 98.2 (05-19-22 @ 10:07)  HR: 105 (05-19-22 @ 10:07) (105 - 105)  BP: 167/73 (05-19-22 @ 10:07) (167/73 - 167/73)  BP(mean): --  RR: 18 (05-19-22 @ 10:07) (18 - 18)  SpO2: 95% (05-19-22 @ 10:07) (95% - 95%)  I&O's Summary      PHYSICAL EXAM:  Constitutional: NAD, comfortable in bed.  HEENT: NC/AT, PERRLA, EOMI, no conjunctival pallor or scleral icterus, MMM  Neck: Supple, no JVD  Respiratory: CTA B/L. No w/r/r.   Cardiovascular: RRR, normal S1 and S2, no m/r/g.   Gastrointestinal: +BS, soft NTND, no guarding or rebound tenderness, no palpable masses   Extremities: wwp; no cyanosis, clubbing or edema.   Vascular: Pulses equal and strong throughout.   Neurological: AAOx1, no CN deficits, strength and sensation intact throughout.   Skin: No gross skin abnormalities or rashe        LABS:                        10.4   7.42  )-----------( 433      ( 18 May 2022 09:10 )             32.3     05-18    140  |  100  |  20  ----------------------------<  109<H>  4.0   |  24  |  0.70    Ca    9.6      18 May 2022 09:10  Phos  3.9     05-18  Mg     2.0     05-18              RADIOLOGY & ADDITIONAL TESTS:    MEDICATIONS  (STANDING):  amLODIPine   Tablet 10 milliGRAM(s) Oral daily  atorvastatin 20 milliGRAM(s) Oral at bedtime  buPROPion XL (24-Hour) . 450 milliGRAM(s) Oral daily  enoxaparin Injectable 40 milliGRAM(s) SubCutaneous every 24 hours  FLUoxetine 10 milliGRAM(s) Oral daily  pantoprazole    Tablet 40 milliGRAM(s) Oral before breakfast  QUEtiapine 75 milliGRAM(s) Oral at bedtime    MEDICATIONS  (PRN):  acetaminophen     Tablet .. 650 milliGRAM(s) Oral every 6 hours PRN Temp greater or equal to 38C (100.4F), Mild Pain (1 - 3)  melatonin 3 milliGRAM(s) Oral at bedtime PRN Insomnia

## 2022-05-19 NOTE — PROGRESS NOTE ADULT - PROBLEM SELECTOR PLAN 1
Patient presenting with increased agitation and decreased PO intake over past week i/s/o dementia. History of increased aggression and cognitive decline over past month. Patient with outpatient labs showing elevated WBC with neutrophilic predominance, increased CRP, decreased ferritin, increased troponin I. Admission labs today with CRP of 144 from 217 the previous day. ESR of >130. CT head with age related changes no acute pathology. Awaiting rheum and psych consults. Patient is slightly agitated on exam, AAO x1, forgetful. Home medication: fluoxetine 20 mg q daily, bupropion 450 mg q daily, seroquel 25mg in AM and 75 mg in PM, alprazolam 0.5 mg in PM and trazodone 50 mg in PM.   - rheum recommends temporal artery biopsy  - neuro recommends f/u w psych  - ESR >130, .3  - f/u PAU, RF, anca  - blood cultures with coag neg staph, will recheck  - c/w home medications

## 2022-05-20 NOTE — DIETITIAN INITIAL EVALUATION ADULT - OTHER INFO
Patient is an 87 y/o F PMHx mild dementia, MS, breast cancer BIBA from home for abnormal labs, agitation and weakness x 1 week admitted for further work up of AMS.    Attempted to visit pt x2 today, unable to speak d/t pt remains on the phone. Pt with 24hr aide with pt but assisting pt with phone call. Per RN, reports pt to be uncooperative, highly agitated. Unable to obtain information at this time. Per wt hx in EMR, UBW appears to fluctuate between 140-150 lbs. CBW of 154 lbs, remains relatively consistent. Currently on DASH TLC diet, unable to assess intake at this time, per RN, reports pt ate a little bit but unable to assess %PO. No reported pain, n/v/d/c. +BM 5/19, pt is fecal incontinent. Skin: Amaury 15, ANDREW, pt refused per flowsheet. RD to follow per protocol. Please see below for nutr recs.  Patient is an 89 y/o F PMHx mild dementia, MS, breast cancer BIBA from home for abnormal labs, agitation and weakness x 1 week admitted for further work up of AMS.    Attempted to visit pt x2 today, unable to speak d/t pt remaining on the phone. 24hr aide with pt, assisting with phone call. Per RN, reports pt to be uncooperative, highly agitated. Unable to obtain information at this time. Per wt hx in EMR, UBW appears to fluctuate between 140-150 lbs. CBW of 154 lbs, remains relatively consistent. Currently on DASH TLC diet, unable to assess intake at this time, per RN, reports pt ate a little bit but unable to assess %PO. No reported pain, n/v/d/c. +BM 5/19, pt is fecal incontinent. Skin: Amaury 15, ANDREW, pt refused per flowsheet. RD to follow per protocol. Please see below for nutr recs.

## 2022-05-20 NOTE — PHYSICAL THERAPY INITIAL EVALUATION ADULT - PERTINENT HX OF CURRENT PROBLEM, REHAB EVAL
Patient is an 89 y/o F PMHx mild dementia, MS, breast cancer BIBA from home for abnormal labs i/s/o AMS. Patient was seen by Dr. Barry at in the home and was noticed to be altered with agitation. Outpatient work up was started and she was noted to have WBC 13.9 (with neutrophilic predominance), elevated trop I, elevated CRP (40.4), low serum iron and sent her to ED.

## 2022-05-20 NOTE — PROGRESS NOTE ADULT - SUBJECTIVE AND OBJECTIVE BOX
**INCOMPLETE NOTE    OVERNIGHT EVENTS:    SUBJECTIVE:  Patient seen and examined at bedside.    Vital Signs Last 12 Hrs  T(F): --  HR: --  BP: --  BP(mean): --  RR: --  SpO2: --  I&O's Summary      PHYSICAL EXAM:  Constitutional: NAD, comfortable in bed.  HEENT: NC/AT, PERRLA, EOMI, no conjunctival pallor or scleral icterus, MMM  Neck: Supple, no JVD  Respiratory: CTA B/L. No w/r/r.   Cardiovascular: RRR, normal S1 and S2, no m/r/g.   Gastrointestinal: +BS, soft NTND, no guarding or rebound tenderness, no palpable masses   Extremities: wwp; no cyanosis, clubbing or edema.   Vascular: Pulses equal and strong throughout.   Neurological: AAOx3, no CN deficits, strength and sensation intact throughout.   Skin: No gross skin abnormalities or rashes        LABS:                        10.4   7.42  )-----------( 433      ( 18 May 2022 09:10 )             32.3     05-18    140  |  100  |  20  ----------------------------<  109<H>  4.0   |  24  |  0.70    Ca    9.6      18 May 2022 09:10  Phos  3.9     05-18  Mg     2.0     05-18              RADIOLOGY & ADDITIONAL TESTS:    MEDICATIONS  (STANDING):  amLODIPine   Tablet 10 milliGRAM(s) Oral daily  atorvastatin 20 milliGRAM(s) Oral at bedtime  buPROPion XL (24-Hour) . 450 milliGRAM(s) Oral daily  enoxaparin Injectable 40 milliGRAM(s) SubCutaneous every 24 hours  FLUoxetine 10 milliGRAM(s) Oral daily  pantoprazole    Tablet 40 milliGRAM(s) Oral before breakfast  QUEtiapine 75 milliGRAM(s) Oral at bedtime    MEDICATIONS  (PRN):  acetaminophen     Tablet .. 650 milliGRAM(s) Oral every 6 hours PRN Temp greater or equal to 38C (100.4F), Mild Pain (1 - 3)  melatonin 3 milliGRAM(s) Oral at bedtime PRN Insomnia

## 2022-05-20 NOTE — BH CONSULTATION LIAISON PROGRESS NOTE - NSBHCONSULTMEDAGITATION_PSY_A_CORE FT
Seroquel 25 mg qam and 50 mg qhs. Hold for sedation   Seroquel 25 mg qday prn agitation
Seroquel 25 mg qam and 50 mg qhs. Hold for sedation   Seroquel 25 mg qday prn agitation

## 2022-05-20 NOTE — PHYSICAL THERAPY INITIAL EVALUATION ADULT - ADDITIONAL COMMENTS
As per HHA, Pt lives in an apartment building with no LEATHA. Pt ambulates with Hand held assist and SC at time in the home. Aide reports have a shower chair in the home.

## 2022-05-20 NOTE — BH CONSULTATION LIAISON PROGRESS NOTE - NSBHFUPINTERVALHXFT_PSY_A_CORE
Pt seen. Discussed case with Dr. Barry. Spoke to aids at bedside. Pt continues to be verbally abusive with staff. She has not been physically aggressive however. Gives provocative responses when questioned about her symptoms, highly suggestive of personality pathology rather than delirium.   Pt states that "nobody came to talk to her about biopsy". Aids confirmed that medical staff had numerous discussions with pt about need for biopsy. Pt states she is not going to have it because "I have never heard about it". Pt is unwilling to engage in meaningful discussion about the procedure.     As per aid pt did not sleep well last night. Pt did not take Seroquel qhs dose (? refused)
Pt was found to be lying in bed with eyes closed. She refuses to participate in the interview.   As per aids pt slept well, was more alert this am. Had minimal po intake.

## 2022-05-20 NOTE — BH CONSULTATION LIAISON PROGRESS NOTE - NSBHADMITCOORDWITH_PSY_A_CORE
medical staff/outpatient provider/family/Caregiver
medical staff/outpatient provider/family/Caregiver

## 2022-05-20 NOTE — BH CONSULTATION LIAISON PROGRESS NOTE - OTHER
focused on her daughters abandoning her and going to their summer homes which she "bought for them" instead of being by her bedside verbally abusive with aids, dismissive and provocative in her answers  focused on being abandoned by her daughters

## 2022-05-20 NOTE — DIETITIAN INITIAL EVALUATION ADULT - ADD RECOMMEND
1. DASH TLC diet  >>honor food preferences as able  2. Monitor %PO intake  3. BM and pain regimen per team  4. Monitor lytes, replete prn  5. RD to remain available, obtain additional nutrition information as able

## 2022-05-20 NOTE — DIETITIAN INITIAL EVALUATION ADULT - OTHER CALCULATIONS
lbs. IBW used to calculate energy needs due to pt's current body weight exceeding 120% of IBW (133%). Needs adjusted for adv age and wt

## 2022-05-20 NOTE — BH CONSULTATION LIAISON PROGRESS NOTE - NSICDXBHSECONDARYDX_PSY_ALL_CORE
SIDNEY (generalized anxiety disorder)   F41.1  Major depressive disorder   F32.9  
SIDNEY (generalized anxiety disorder)   F41.1  Major depressive disorder   F32.9

## 2022-05-20 NOTE — BH CONSULTATION LIAISON PROGRESS NOTE - NSBHADMITCOUNSEL_PSY_A_CORE
diagnostic results/impressions and/or recommended studies/risks and benefits of treatment options/instructions for management, treatment and follow up/importance of adherence to chosen treatment/risk factor reduction/client/family/caregiver education/prognosis
diagnostic results/impressions and/or recommended studies/risks and benefits of treatment options/instructions for management, treatment and follow up/importance of adherence to chosen treatment/risk factor reduction/client/family/caregiver education/prognosis

## 2022-05-20 NOTE — BH CONSULTATION LIAISON PROGRESS NOTE - NSBHCHARTREVIEWVS_PSY_A_CORE FT
Vital Signs Last 24 Hrs  T(C): 36.8 (20 May 2022 12:34), Max: 36.8 (20 May 2022 12:34)  T(F): 98.3 (20 May 2022 12:34), Max: 98.3 (20 May 2022 12:34)  HR: 104 (20 May 2022 12:34) (104 - 104)  BP: 170/93 (20 May 2022 12:34) (170/93 - 170/93)  BP(mean): --  RR: 20 (20 May 2022 12:34) (20 - 20)  SpO2: 95% (20 May 2022 12:34) (95% - 95%)
Vital Signs Last 24 Hrs  T(C): 36.8 (19 May 2022 10:07), Max: 37.1 (18 May 2022 17:45)  T(F): 98.2 (19 May 2022 10:07), Max: 98.8 (18 May 2022 17:45)  HR: 105 (19 May 2022 10:07) (101 - 105)  BP: 167/73 (19 May 2022 10:07) (161/86 - 167/73)  BP(mean): --  RR: 18 (19 May 2022 10:07) (18 - 19)  SpO2: 95% (19 May 2022 10:07) (92% - 95%)

## 2022-05-20 NOTE — BH CONSULTATION LIAISON PROGRESS NOTE - NSBHFUPREASONCONS_PSY_A_CORE
MA/nurse's notes reviewed. Subjective: Patient is here today for sinus infection. She states that she started with a cold about 15 days ago however over the past week her symptoms have gotten progressively worse and now she's having thick purulent nasal drainage, postnasal drainage, headaches and sinus pressure. She states that she feels that she's having popping of her ears but no significant pain. She denies sore throat or cough. She denies fevers or chills. She's been feeling tired and rundown. She tried Claritin with no improvement of symptoms. She has a history of having one sinus infection last fall but no history of recurrent sinus infections prior to that. Current Outpatient Prescriptions   Medication Sig Dispense Refill   â¢ PARAGARD INTRAUTERINE COPPER IU      â¢ Multiple Vitamins-Minerals (MULTIVITAMIN PO)      â¢ Probiotic Product (PROBIOTIC PO)      â¢ doxycycline monohydrate (MONODOX) 100 MG capsule Take 1 capsule by mouth 2 times daily. 20 capsule 0     No current facility-administered medications for this visit. ALLERGIES: no known allergies. 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 Visit Vitals  /60   Pulse 101   Temp 97.4 Â°F (36.3 Â°C) (Oral)   Wt 51.7 kg   SpO2 100%   BMI 22.26 kg/mÂ²    HEENT: Normocephalic, atraumatic. Ears-TMs without erythema, bulging, retraction. Canals without cerumen or drainage.
Sinuses-tender to palpation and percussion. Nose- purulent rhinorrhea, no swelling of the turbinates, septum midline. Oropharynx-mucous membranes moist, posterior oropharynx without erythema or exudates, teeth in good repair. Lips are not dry or cracked. Neck: Positive for anterior cervical chain lymphadenopathy. Lungs: Clear to auscultation without rales, rhonchi, or wheezes. Heart: Regular rate and rhythm without murmur or extra sounds. Assessment:   1. Acute non-recurrent sinusitis, unspecified location      Plan:  Patient states that she believes that she's had problems with her stomach with amoxicillin previously and would like to avoid that. I gave her doxycycline 100 mg twice a day for 10 days. Discussed symptomatic treatment. Follow-up if her symptoms get progressively worse or she is not improving within the next several days or resolved by the time she's done with her antibiotic.     Supervising physician, Dr. Martín Reed
dementia
delirium/dementia

## 2022-05-20 NOTE — BH CONSULTATION LIAISON PROGRESS NOTE - NSBHASSESSMENTFT_PSY_ALL_CORE
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   As per collateral information pt with hx/o memory decline, worsening over the past two months, most recently with increasing agitation/physical aggression towards aids for the past 2 weeks. On current exam pt is currently calm and cooperative, exhibiting mild memory deficit. There is no evidence of acute agitation, acute mood or psychotic symptoms. Medical etiology of acute change in MS needs to be ruled out.   Other potential etiologies include progression of neurocognitive disease, exacerbation of depression (although pt denies recent depressive symptoms), iatrogenic (pt's psychiatric medications were adjusted recently).     Plan:  -d/c Xanax as it may be contributing to pt's memory decline/confusion. Continue to discuss with pt minimizing ETOH intake  -Suggest decreasing Wellbutrin to 300 mg qam (give early in am). Suggest slow taper off Wellbutrin as it can be exacerbating irritability. Increase Prozac to 20 mg daily. SSRI's are preferred in pt's with agitation.   -Seroquel 25 mg q am and 75mg qhs. Hold for sedation   -Seroquel 25 mg qday prn agitation  -Provide frequent orientation clues.  -Pt is currently lacks capacity to consent procedure as she was unable to recall conversation about the procedure, unable to verbalize understanding of risks and benefits.     
Pt is an 89 yo Trin[place]  X2 female domiciled at home with 24h home health aides with PMHx of mild dementia, breast CA s/p resection at [facility], PPHx per daughter of "Borderline Personality D/o, Narcissistic Personality  D/o", depression and anxiety, no prior SA's or psychiatric hospitalizations, who was BIBA from her PCP for abn labs.   As per collateral information pt with hx/o memory decline, worsening over the past two months, most recently with increasing agitation/physical aggression towards aids for the past 2 weeks. On current exam pt is currently calm and cooperative, exhibiting mild memory deficit. There is no evidence of acute agitation, acute mood or psychotic symptoms. Medical etiology of acute change in MS needs to be ruled out.   Other potential etiologies include progression of neurocognitive disease, exacerbation of depression (although pt denies recent depressive symptoms), iatrogenic (pt's psychiatric medications were adjusted recently).     Plan:  -d/c Xanax as it may be contributing to pt's memory decline/confusion. Continue to discuss with pt minimizing ETOH intake  -Continue Wellbutrin and Prozac at current doses  -Seroquel 25 mg q am and 50mg qhs. Hold for sedation   -Seroquel 25 mg qday prn agitation  -Provide frequent orientation clues.

## 2022-05-20 NOTE — DIETITIAN INITIAL EVALUATION ADULT - FEEDING SKILL
REMINDER: An FMLA/Disability form was recently sent to your office for review and signature.     Please complete, sign and fax to [phone] as soon as possible.     Thank you.   Forms Completion Team.   
independent

## 2022-05-20 NOTE — DIETITIAN INITIAL EVALUATION ADULT - NSICDXPASTSURGICALHX_GEN_ALL_CORE_FT
PAST SURGICAL HISTORY:  Bilateral bunions     History of lymph node excision in axilla, around left breast 40 years ago     I will SWITCH the dose or number of times a day I take the medications listed below when I get home from the hospital:  None

## 2022-05-20 NOTE — BH CONSULTATION LIAISON PROGRESS NOTE - ADDITIONAL DETAILS / COMMENTS
pt refuses to answer many question complicating assessment. There is a clear oppositional quality to her interaction, suggestive of personality disorder

## 2022-05-21 NOTE — PROGRESS NOTE ADULT - PROBLEM SELECTOR PLAN 3
Patient with history of GERD. Home medication: Dexilant 60 mg q daily.    - c/w pantoprazole 40 mg q daily Patient with history of GERD. Home medication: Dexilant 60 mg q daily.  - c/w pantoprazole 40 mg q daily

## 2022-05-21 NOTE — PROGRESS NOTE ADULT - PROBLEM SELECTOR PLAN 2
Home medication: amlodipine 10 mg q daily.    - c/w amlodipine 10 mg q daily Home medication: amlodipine 10 mg q daily.  - c/w amlodipine 10 mg q daily

## 2022-05-21 NOTE — PROGRESS NOTE ADULT - SUBJECTIVE AND OBJECTIVE BOX
**INCOMPLETE NOTE    OVERNIGHT EVENTS:    SUBJECTIVE:  Patient seen and examined at bedside.    Vital Signs Last 12 Hrs  T(F): 98.4 (05-21-22 @ 04:47), Max: 98.5 (05-20-22 @ 22:00)  HR: 105 (05-21-22 @ 04:47) (105 - 106)  BP: 155/85 (05-21-22 @ 04:47) (145/72 - 155/85)  BP(mean): --  RR: 17 (05-21-22 @ 04:47) (16 - 17)  SpO2: 94% (05-21-22 @ 04:47) (94% - 94%)  I&O's Summary      PHYSICAL EXAM:  Constitutional: NAD, comfortable in bed.  HEENT: NC/AT, PERRLA, EOMI, no conjunctival pallor or scleral icterus, MMM  Neck: Supple, no JVD  Respiratory: CTA B/L. No w/r/r.   Cardiovascular: RRR, normal S1 and S2, no m/r/g.   Gastrointestinal: +BS, soft NTND, no guarding or rebound tenderness, no palpable masses   Extremities: wwp; no cyanosis, clubbing or edema.   Vascular: Pulses equal and strong throughout.   Neurological: AAOx3, no CN deficits, strength and sensation intact throughout.   Skin: No gross skin abnormalities or rashes        LABS:                  RADIOLOGY & ADDITIONAL TESTS:    MEDICATIONS  (STANDING):  amLODIPine   Tablet 10 milliGRAM(s) Oral daily  atorvastatin 20 milliGRAM(s) Oral at bedtime  buPROPion XL (24-Hour) . 450 milliGRAM(s) Oral daily  enoxaparin Injectable 40 milliGRAM(s) SubCutaneous every 24 hours  FLUoxetine 10 milliGRAM(s) Oral daily  pantoprazole    Tablet 40 milliGRAM(s) Oral before breakfast  QUEtiapine 75 milliGRAM(s) Oral at bedtime    MEDICATIONS  (PRN):  acetaminophen     Tablet .. 650 milliGRAM(s) Oral every 6 hours PRN Temp greater or equal to 38C (100.4F), Mild Pain (1 - 3)  melatonin 3 milliGRAM(s) Oral at bedtime PRN Insomnia   OVERNIGHT EVENTS: DELIA    SUBJECTIVE:  Patient seen and examined at bedside.  No complaints, ROS negative    Vital Signs Last 12 Hrs  T(F): 98.4 (05-21-22 @ 04:47), Max: 98.5 (05-20-22 @ 22:00)  HR: 105 (05-21-22 @ 04:47) (105 - 106)  BP: 155/85 (05-21-22 @ 04:47) (145/72 - 155/85)  BP(mean): --  RR: 17 (05-21-22 @ 04:47) (16 - 17)  SpO2: 94% (05-21-22 @ 04:47) (94% - 94%)  I&O's Summary    PHYSICAL EXAM:  Constitutional: NAD, comfortable in bed.  HEENT: NC/AT, PERRLA, EOMI, no conjunctival pallor or scleral icterus, MMM  Neck: Supple, no JVD  Respiratory: CTA B/L. No w/r/r.   Cardiovascular: RRR, normal S1 and S2, no m/r/g.   Gastrointestinal: +BS, soft NTND, no guarding or rebound tenderness, no palpable masses   Extremities: wwp; no cyanosis, clubbing or edema.   Vascular: Pulses equal and strong throughout.   Neurological: AAOx3, no CN deficits, strength and sensation intact throughout.   Skin: No gross skin abnormalities or rashes    LABS:    RADIOLOGY & ADDITIONAL TESTS:    MEDICATIONS  (STANDING):  amLODIPine   Tablet 10 milliGRAM(s) Oral daily  atorvastatin 20 milliGRAM(s) Oral at bedtime  buPROPion XL (24-Hour) . 450 milliGRAM(s) Oral daily  enoxaparin Injectable 40 milliGRAM(s) SubCutaneous every 24 hours  FLUoxetine 10 milliGRAM(s) Oral daily  pantoprazole    Tablet 40 milliGRAM(s) Oral before breakfast  QUEtiapine 75 milliGRAM(s) Oral at bedtime    MEDICATIONS  (PRN):  acetaminophen     Tablet .. 650 milliGRAM(s) Oral every 6 hours PRN Temp greater or equal to 38C (100.4F), Mild Pain (1 - 3)  melatonin 3 milliGRAM(s) Oral at bedtime PRN Insomnia

## 2022-05-21 NOTE — PROGRESS NOTE ADULT - PROBLEM SELECTOR PLAN 4
Patient with history of MS.     - continue to monitor Patient with history of MS.   - continue to monitor

## 2022-05-22 NOTE — PROGRESS NOTE ADULT - SUBJECTIVE AND OBJECTIVE BOX
hemodynamically stable  patient was in good spirits this morning and amenable to bipsy tomorrow    MEDICATIONS  (STANDING):  amLODIPine   Tablet 10 milliGRAM(s) Oral daily  atorvastatin 20 milliGRAM(s) Oral at bedtime  buPROPion XL (24-Hour) . 450 milliGRAM(s) Oral daily  enoxaparin Injectable 40 milliGRAM(s) SubCutaneous every 24 hours  FLUoxetine 10 milliGRAM(s) Oral daily  magnesium sulfate  IVPB 1 Gram(s) IV Intermittent once  pantoprazole    Tablet 40 milliGRAM(s) Oral before breakfast  potassium chloride   Powder 40 milliEquivalent(s) Oral once  QUEtiapine 75 milliGRAM(s) Oral at bedtime    MEDICATIONS  (PRN):  acetaminophen     Tablet .. 650 milliGRAM(s) Oral every 6 hours PRN Temp greater or equal to 38C (100.4F), Mild Pain (1 - 3)  melatonin 3 milliGRAM(s) Oral at bedtime PRN Insomnia    ICU Vital Signs Last 24 Hrs  T(C): 37.3 (22 May 2022 17:17), Max: 37.3 (22 May 2022 17:17)  T(F): 99.1 (22 May 2022 17:17), Max: 99.1 (22 May 2022 17:17)  HR: 90 (22 May 2022 17:17) (82 - 94)  BP: 142/82 (22 May 2022 17:17) (142/82 - 170/79)  RR: 18 (22 May 2022 17:17) (16 - 18)  SpO2: 93% (22 May 2022 17:17) (93% - 95%)    Resting in NAD and easily arouasble  No JVD  Chest clear  CV rrr s1s2 no murmur  Abd soft  Ext no edema                          10.8   6.10  )-----------( 416      ( 22 May 2022 11:07 )             33.8   05-22    139  |  100  |  29<H>  ----------------------------<  107<H>  3.7   |  26  |  0.96    Ca    9.4      22 May 2022 11:07  Phos  4.2     05-22  Mg     1.8     05-22

## 2022-05-23 NOTE — DISCHARGE NOTE PROVIDER - PROVIDER TOKENS
PROVIDER:[TOKEN:[[id number]],FOLLOWUP:[1 week]],PROVIDER:[TOKEN:[[id number]],FOLLOWUP:[1 week],ESTABLISHEDPATIENT:[T]]

## 2022-05-23 NOTE — DISCHARGE NOTE PROVIDER - CARE PROVIDERS DIRECT ADDRESSES
[email or web address]

## 2022-05-23 NOTE — PROGRESS NOTE ADULT - PROBLEM SELECTOR PLAN 6
F: per PO   E: replete to K>4, Mg>2, Phos>2.5  N: DASH   Ppx: lovenox     Code Status: FULL     Dispo: [facility]
F: per PO   E: replete to K>4, Mg>2, Phos>2.5  N: DASH   Ppx: lovenox     Code Status: FULL     Dispo: [facility]
F: per PO   E: replete to K>4, Mg>2, Phos>2.5  N: DASH   Ppx: lovenox     Code Status: FULL     Dispo: [facility]
F: per PO   E: replete to K>4, Mg>2, Phos>2.5  N: DASH   Ppx: lovenox     Code Status: FULL     Dispo: [facility]
F: per PO   E: replete to K>4, Mg>2, Phos>2.5  N: DASH   Ppx: lovenox     Code Status: FULL     Dispo: [facility]

## 2022-05-23 NOTE — PROGRESS NOTE ADULT - SUBJECTIVE AND OBJECTIVE BOX
**INCOMPLETE NOTE    OVERNIGHT EVENTS:    SUBJECTIVE:  Patient seen and examined at bedside.    Vital Signs Last 12 Hrs  T(F): 97.7 (05-22-22 @ 22:10), Max: 97.7 (05-22-22 @ 22:10)  HR: 91 (05-22-22 @ 22:10) (91 - 91)  BP: 110/61 (05-22-22 @ 22:10) (110/61 - 110/61)  BP(mean): --  RR: 18 (05-22-22 @ 22:10) (18 - 18)  SpO2: 92% (05-22-22 @ 22:10) (92% - 92%)  I&O's Summary      PHYSICAL EXAM:  Constitutional: NAD, comfortable in bed.  HEENT: NC/AT, PERRLA, EOMI, no conjunctival pallor or scleral icterus, MMM  Neck: Supple, no JVD  Respiratory: CTA B/L. No w/r/r.   Cardiovascular: RRR, normal S1 and S2, no m/r/g.   Gastrointestinal: +BS, soft NTND, no guarding or rebound tenderness, no palpable masses   Extremities: wwp; no cyanosis, clubbing or edema.   Vascular: Pulses equal and strong throughout.   Neurological: AAOx3, no CN deficits, strength and sensation intact throughout.   Skin: No gross skin abnormalities or rashes        LABS:                        11.6   6.15  )-----------( 449      ( 23 May 2022 08:25 )             35.7     05-23    142  |  102  |  x   ----------------------------<  x   3.9   |  x   |  x     Ca    9.4      22 May 2022 11:07  Phos  4.1     05-23  Mg     2.1     05-23              RADIOLOGY & ADDITIONAL TESTS:    MEDICATIONS  (STANDING):  amLODIPine   Tablet 10 milliGRAM(s) Oral daily  atorvastatin 20 milliGRAM(s) Oral at bedtime  buPROPion XL (24-Hour) . 450 milliGRAM(s) Oral daily  enoxaparin Injectable 40 milliGRAM(s) SubCutaneous every 24 hours  FLUoxetine 10 milliGRAM(s) Oral daily  pantoprazole    Tablet 40 milliGRAM(s) Oral before breakfast  QUEtiapine 75 milliGRAM(s) Oral at bedtime    MEDICATIONS  (PRN):  acetaminophen     Tablet .. 650 milliGRAM(s) Oral every 6 hours PRN Temp greater or equal to 38C (100.4F), Mild Pain (1 - 3)  melatonin 3 milliGRAM(s) Oral at bedtime PRN Insomnia   OVERNIGHT EVENTS: DELIA    SUBJECTIVE:  Patient seen and examined at bedside.  Refused examination    Vital Signs Last 12 Hrs  T(F): 97.7 (05-22-22 @ 22:10), Max: 97.7 (05-22-22 @ 22:10)  HR: 91 (05-22-22 @ 22:10) (91 - 91)  BP: 110/61 (05-22-22 @ 22:10) (110/61 - 110/61)  BP(mean): --  RR: 18 (05-22-22 @ 22:10) (18 - 18)  SpO2: 92% (05-22-22 @ 22:10) (92% - 92%)  I&O's Summary      PHYSICAL EXAM:  Constitutional: NAD, comfortable in bed.  HEENT: NC/AT, PERRLA, EOMI, no conjunctival pallor or scleral icterus, MMM  Neck: Supple, no JVD  Respiratory: CTA B/L. No w/r/r.   Cardiovascular: RRR, normal S1 and S2, no m/r/g.   Gastrointestinal: +BS, soft NTND, no guarding or rebound tenderness, no palpable masses   Extremities: wwp; no cyanosis, clubbing or edema.   Vascular: Pulses equal and strong throughout.   Neurological: AAOx3, no CN deficits, strength and sensation intact throughout.   Skin: No gross skin abnormalities or rashes        LABS:                        11.6   6.15  )-----------( 449      ( 23 May 2022 08:25 )             35.7     05-23    142  |  102  |  x   ----------------------------<  x   3.9   |  x   |  x     Ca    9.4      22 May 2022 11:07  Phos  4.1     05-23  Mg     2.1     05-23              RADIOLOGY & ADDITIONAL TESTS:    MEDICATIONS  (STANDING):  amLODIPine   Tablet 10 milliGRAM(s) Oral daily  atorvastatin 20 milliGRAM(s) Oral at bedtime  buPROPion XL (24-Hour) . 450 milliGRAM(s) Oral daily  enoxaparin Injectable 40 milliGRAM(s) SubCutaneous every 24 hours  FLUoxetine 10 milliGRAM(s) Oral daily  pantoprazole    Tablet 40 milliGRAM(s) Oral before breakfast  QUEtiapine 75 milliGRAM(s) Oral at bedtime    MEDICATIONS  (PRN):  acetaminophen     Tablet .. 650 milliGRAM(s) Oral every 6 hours PRN Temp greater or equal to 38C (100.4F), Mild Pain (1 - 3)  melatonin 3 milliGRAM(s) Oral at bedtime PRN Insomnia

## 2022-05-23 NOTE — PROGRESS NOTE ADULT - ASSESSMENT
Patient is an 87 y/o F PMHx mild dementia, MS, breast cancer BIBA from home for abnormal labs, agitation and weakness x 1 week admitted for further work up of AMS. 
Patient is an 89 y/o F PMHx mild dementia, MS, breast cancer BIBA from home for abnormal labs, agitation and weakness x 1 week admitted for further work up of AMS. 
hemodynamically stable - tolerated seroquel well last night with good sleep and mood this am  She was amenable biopsy today but by the afternoon her mood was changing and she became more agitated  -would address with psych if am seroquel should be increased  DVT porphylaxis  Blood pressure controlled  Mild anemia - check Fe panel  Please repeat ESR and CRP with am labs
Plan:  -consult rheumatology for CRP and joint pain  -consult psych for mental status changes  -consult neuro for further evaluation of her acute changes and to rule out neurodegenerative disorder  -followup urine culture  -no acute indication for steroids (awaiti rheum)
Plan:  daughter Aurelia is health care proxy and will sign consent for biopsy (she should be contacted by cell)  -rule out TA with biopsy  -cont with psych rec's  
Patient is an 87 y/o F PMHx mild dementia, MS, breast cancer BIBA from home for abnormal labs, agitation and weakness x 1 week admitted for further work up of AMS. 
Patient is an 89 y/o F PMHx mild dementia, MS, breast cancer BIBA from home for abnormal labs, agitation and weakness x 1 week admitted for further work up of AMS. 
Patient is an 87 y/o F PMHx mild dementia, MS, breast cancer BIBA from home for abnormal labs, agitation and weakness x 1 week admitted for further work up of AMS.

## 2022-05-23 NOTE — CONSULT NOTE ADULT - SUBJECTIVE AND OBJECTIVE BOX
Attending:  Oskar    HPI:  89 y/o F PMHx mild dementia, MS, breast cancer BIBA from home for abnormal labs i/s/o AMS. Patient was seen by Dr. Barry at in the home and was noticed to be altered with agitation. Outpatient work up was started and she was noted to have WBC 13.9 (with neutrophilic predominance), elevated trop I, elevated CRP (40.4), low serum iron and sent her to ED. Per health aide and long , patient has been increasingly agitated over the past week, and additionally not leaving bed including in order to urinate and has had a non-productive cough. She has had decreased PO intake.  Denies chest pain, SOB, fever, cough, headache. Of note patient was previously on bupropion 450 mg q daily, her former psychiatrist . Her new psychiatrist decreased and then discontinued bupropion which coincided with patients increased agitation. She was restarted on bupropion 450 mg last week, in addition to seroquel 25 mg in AM and 75 mg in PM.     Vascular consulted for TAB to r/o temporal arteritis.  Rheumatology consulted and stated this- elderly patient with unexplained ESR / CRP worsening dementia - no localizing disease process or infection - temporal arteritis - although rare could explain process while risks of steroids might be significant would suggest Right sided temporal artery biopsy to evaluate this consideration. Patient had been refusing the procedure all last week until sat.  Psych was called to see if she has capacity and was deemed that she does.  Patient is now stating she is agreeable to surgery.  Patient denies CP/SOB/N/V.    In the ED,  Vitals: T 97.5 HR 98 /73 RR 18 95% on RA   Labs: WBC WNL, plt 435; .2    UA: + protein, + ketone, blood moderate, <5 RBC, <5 WBC   Imaging:    EKG: NSR with T wave inversion in V5 and V6    CXR: wet read: no major changes from 2019 CXR, possible trace r side effusion, no focal consolidation  ED Course: s/p 1L NS    (17 May 2022 14:06)      PAST MEDICAL & SURGICAL HISTORY:  Breast cancer      Bilateral bunions      History of lymph node excision  in axilla, around left breast 40 years ago          MEDICATIONS  (STANDING):  amLODIPine   Tablet 10 milliGRAM(s) Oral daily  atorvastatin 20 milliGRAM(s) Oral at bedtime  buPROPion XL (24-Hour) . 450 milliGRAM(s) Oral daily  enoxaparin Injectable 40 milliGRAM(s) SubCutaneous every 24 hours  FLUoxetine 10 milliGRAM(s) Oral daily  pantoprazole    Tablet 40 milliGRAM(s) Oral before breakfast  QUEtiapine 75 milliGRAM(s) Oral at bedtime    MEDICATIONS  (PRN):  acetaminophen     Tablet .. 650 milliGRAM(s) Oral every 6 hours PRN Temp greater or equal to 38C (100.4F), Mild Pain (1 - 3)  melatonin 3 milliGRAM(s) Oral at bedtime PRN Insomnia      Allergies    No Known Allergies    Intolerances        SOCIAL HISTORY:    FAMILY HISTORY:  Known health problems: none  	    Vital Signs Last 24 Hrs  T(C): 36.7 (23 May 2022 12:29), Max: 37.3 (22 May 2022 17:17)  T(F): 98.1 (23 May 2022 12:29), Max: 99.1 (22 May 2022 17:17)  HR: 87 (23 May 2022 12:29) (87 - 91)  BP: 163/85 (23 May 2022 12:29) (110/61 - 163/85)  BP(mean): --  RR: 18 (23 May 2022 12:29) (18 - 18)  SpO2: 95% (23 May 2022 12:29) (92% - 95%)    I&O's Summary      Physical Exam:  General: NAD, resting comfortably  Pulmonary: normal resp effort  Cardiovascular: NSR  Neuro: A/O x 3  rest of physical could not be performed as patient became extremely agitated.    LABS:                        11.6   6.15  )-----------( 449      ( 23 May 2022 08:25 )             35.7         142  |  102  |  28<H>  ----------------------------<  123<H>  3.9   |  28  |  0.95    Ca    9.6      23 May 2022 08:25  Phos  4.1       Mg     2.1         TPro  6.7  /  Alb  3.4  /  TBili  0.2  /  DBili  x   /  AST  18  /  ALT  10  /  AlkPhos  104          CAPILLARY BLOOD GLUCOSE        LIVER FUNCTIONS - ( 23 May 2022 08:25 )  Alb: 3.4 g/dL / Pro: 6.7 g/dL / ALK PHOS: 104 U/L / ALT: 10 U/L / AST: 18 U/L / GGT: x             Assessment: 89 y/o F PMHx mild dementia, MS, breast cancer BIBA from home for abnormal labs, agitation and weakness x 1 week admitted for further work up of AMS.  Vascular consulted for TAB.    Recommendations:  - added on to the OR for tomorrow for R TAB  - NPO c mdnt  - am labs cbc, bmp, coags, type and screen, covid  - will obtain consent  - discussed with Dr. Schmitt  - call [phone] with questions

## 2022-05-23 NOTE — PROGRESS NOTE ADULT - SUBJECTIVE AND OBJECTIVE BOX
Hemodynamically stable with mental status changes in the setting of elevated ESR and joint pains - R/O TA  -awaiting biopsy of the TA  -patient is now amenable to biopsy as is her family  -plan for biopsy tomorrow morning and then likely dc home post procedure  -patient needed to be hospitalizaed in preparation for the procedure as her mental status changes made outpatient biopsy impossible  -on dvt prophylaxis  -plan discussed with vasc surg family patient and team

## 2022-05-23 NOTE — DISCHARGE NOTE PROVIDER - HOSPITAL COURSE
88F PMHx mild dementia, MS, breast cancer admitted for AMS, s/p temporal artery biopsy to r/o GCA. To f/u with PCP Dr. Barry & Vasc Dr. Schmitt in 1week for continued outpatient management. No complications during hospital or new medications.

## 2022-05-23 NOTE — DISCHARGE NOTE PROVIDER - NSDCMRMEDTOKEN_GEN_ALL_CORE_FT
amLODIPine 10 mg oral tablet: 1 tab(s) orally once a day  armodafinil 250 mg oral tablet: 1 tab(s) orally once a day  buPROPion 450 mg/24 hours (XL) oral tablet, extended release: 1 tab(s) orally every 24 hours  Dexilant 60 mg oral delayed release capsule: 1 cap(s) orally once a day  FLUoxetine 10 mg oral capsule: 1 cap(s) orally once a day  rosuvastatin 5 mg oral tablet: 1 tab(s) orally once a day  traZODone 50 mg oral tablet: orally once a day (at bedtime)  Xanax 0.5 mg oral tablet: orally once a day (at bedtime)   amLODIPine 10 mg oral tablet: 1 tab(s) orally once a day  armodafinil 250 mg oral tablet: 1 tab(s) orally once a day  buPROPion 450 mg/24 hours (XL) oral tablet, extended release: 1 tab(s) orally every 24 hours  Dexilant 60 mg oral delayed release capsule: 1 cap(s) orally once a day  FLUoxetine 10 mg oral capsule: 1 cap(s) orally once a day  QUEtiapine 25 mg oral tablet: 3 tab(s) orally once a day (at bedtime)  rosuvastatin 5 mg oral tablet: 1 tab(s) orally once a day  traZODone 50 mg oral tablet: orally once a day (at bedtime)  Xanax 0.5 mg oral tablet: orally once a day (at bedtime)

## 2022-05-23 NOTE — DISCHARGE NOTE PROVIDER - CARE PROVIDER_API CALL
Michele Schmitt)  [facility]  Vascular  [address] [place] [address]  Phone: [phone]  Fax: [phone]  Follow Up Time: 1 week    Robert Barry)  Cardiovascular Medicine  [address] [place] [address]  Phone: [phone]  Fax: [phone]  Established Patient  Follow Up Time: 1 week

## 2022-05-24 NOTE — DISCHARGE NOTE NURSING/CASE MANAGEMENT/SOCIAL WORK - NSDCPEFALRISK_GEN_ALL_CORE
For information on Fall & Injury Prevention, visit: [email or web address] OR  [email or web address] OR  https://www.cdc.gov/steadi/patient.html

## 2022-05-24 NOTE — BH CONSULTATION LIAISON ASSESSMENT NOTE - DOMICILED WITH
Quality 431: Preventive Care And Screening: Unhealthy Alcohol Use - Screening: Patient not identified as an unhealthy alcohol user when screened for unhealthy alcohol use using a systematic screening method Detail Level: Detailed Quality 110: Preventive Care And Screening: Influenza Immunization: Influenza Immunization Administered during Influenza season Quality 226: Preventive Care And Screening: Tobacco Use: Screening And Cessation Intervention: Tobacco Screening not Performed for Unknown Reasons Quality 130: Documentation Of Current Medications In The Medical Record: Current Medications Documented Quality 111:Pneumonia Vaccination Status For Older Adults: Pneumococcal Vaccination Previously Received Other

## 2022-05-24 NOTE — DISCHARGE NOTE NURSING/CASE MANAGEMENT/SOCIAL WORK - PATIENT PORTAL LINK FT
You can access the FollowMyHealth Patient Portal offered by [facility] by registering at the following website: [email or web address]. By joining [facility] FollowMyHealth portal, you will also be able to view your health information using other applications (apps) compatible with our system.

## 2022-08-02 NOTE — ED PROVIDER NOTE - DIAGNOSIS COUNSELING, MDM
conducted a detailed discussion... I had a detailed discussion with the patient and/or guardian regarding the historical points, exam findings, and any diagnostic results supporting the discharge/admit diagnosis. Otezla Pregnancy And Lactation Text: This medication is Pregnancy Category C and it isn't known if it is safe during pregnancy. It is unknown if it is excreted in breast milk.

## 2022-10-19 NOTE — ED ADULT NURSE NOTE - CARDIO ASSESSMENT
---
General Sunscreen Counseling: I recommended a broad-spectrum sunscreen with a sun protection factor (SPF) of 30 or higher.  I explained that SPF 30 sunscreens block approximately 97 percent of the sun's harmful rays.  Sunscreens should be applied at least 10 minutes prior to expected sun exposure and then every 2 hours after that as long as sun exposure continues. If swimming or exercising, sunscreen should be reapplied every 40-80 minutes after getting wet or sweating (depending on the water resistance of the sunscreen).  One ounce, or the equivalent of a shot glass full of sunscreen, is adequate to protect the skin not covered by a bathing suit. I also recommended wearing a wide-brimmed hat, a lip balm with a sunscreen and sunglasses. Sun protective clothing can be used in lieu of sunscreen but must be worn the entire time you are exposed to the sun's rays. I also recommended avoiding sun during the peak UVB hours from around 10am to 3pm.
Detail Level: Zone

## 2023-01-01 ENCOUNTER — INPATIENT (INPATIENT)
Facility: HOSPITAL | Age: 88
LOS: 1 days | DRG: 951 | End: 2023-03-29
Attending: INTERNAL MEDICINE | Admitting: INTERNAL MEDICINE
Payer: OTHER MISCELLANEOUS

## 2023-01-01 ENCOUNTER — APPOINTMENT (OUTPATIENT)
Dept: INFUSION THERAPY | Facility: CLINIC | Age: 88
End: 2023-01-01

## 2023-01-01 ENCOUNTER — INPATIENT (INPATIENT)
Facility: HOSPITAL | Age: 88
LOS: 2 days | Discharge: HOSPICE | DRG: 951 | End: 2023-03-27
Attending: STUDENT IN AN ORGANIZED HEALTH CARE EDUCATION/TRAINING PROGRAM | Admitting: STUDENT IN AN ORGANIZED HEALTH CARE EDUCATION/TRAINING PROGRAM
Payer: MEDICARE

## 2023-01-01 ENCOUNTER — TRANSCRIPTION ENCOUNTER (OUTPATIENT)
Age: 88
End: 2023-01-01

## 2023-01-01 VITALS
RESPIRATION RATE: 20 BRPM | OXYGEN SATURATION: 93 % | DIASTOLIC BLOOD PRESSURE: 65 MMHG | SYSTOLIC BLOOD PRESSURE: 111 MMHG | TEMPERATURE: 98 F | HEART RATE: 105 BPM

## 2023-01-01 VITALS
RESPIRATION RATE: 19 BRPM | TEMPERATURE: 98 F | SYSTOLIC BLOOD PRESSURE: 100 MMHG | OXYGEN SATURATION: 95 % | HEART RATE: 97 BPM | DIASTOLIC BLOOD PRESSURE: 60 MMHG

## 2023-01-01 VITALS
SYSTOLIC BLOOD PRESSURE: 99 MMHG | HEART RATE: 110 BPM | RESPIRATION RATE: 16 BRPM | DIASTOLIC BLOOD PRESSURE: 57 MMHG | TEMPERATURE: 98 F | OXYGEN SATURATION: 90 %

## 2023-01-01 VITALS
HEART RATE: 100 BPM | TEMPERATURE: 97 F | SYSTOLIC BLOOD PRESSURE: 96 MMHG | RESPIRATION RATE: 16 BRPM | DIASTOLIC BLOOD PRESSURE: 58 MMHG | OXYGEN SATURATION: 92 %

## 2023-01-01 DIAGNOSIS — R52 PAIN, UNSPECIFIED: ICD-10-CM

## 2023-01-01 DIAGNOSIS — K92.1 MELENA: ICD-10-CM

## 2023-01-01 DIAGNOSIS — Z29.9 ENCOUNTER FOR PROPHYLACTIC MEASURES, UNSPECIFIED: ICD-10-CM

## 2023-01-01 DIAGNOSIS — Z71.89 OTHER SPECIFIED COUNSELING: ICD-10-CM

## 2023-01-01 DIAGNOSIS — Z98.890 OTHER SPECIFIED POSTPROCEDURAL STATES: Chronic | ICD-10-CM

## 2023-01-01 DIAGNOSIS — Z51.5 ENCOUNTER FOR PALLIATIVE CARE: ICD-10-CM

## 2023-01-01 DIAGNOSIS — I24.9 ACUTE ISCHEMIC HEART DISEASE, UNSPECIFIED: ICD-10-CM

## 2023-01-01 DIAGNOSIS — R45.1 RESTLESSNESS AND AGITATION: ICD-10-CM

## 2023-01-01 DIAGNOSIS — R06.00 DYSPNEA, UNSPECIFIED: ICD-10-CM

## 2023-01-01 DIAGNOSIS — E87.70 FLUID OVERLOAD, UNSPECIFIED: ICD-10-CM

## 2023-01-01 DIAGNOSIS — M21.611 BUNION OF RIGHT FOOT: Chronic | ICD-10-CM

## 2023-01-01 DIAGNOSIS — D62 ACUTE POSTHEMORRHAGIC ANEMIA: ICD-10-CM

## 2023-01-01 DIAGNOSIS — K11.7 DISTURBANCES OF SALIVARY SECRETION: ICD-10-CM

## 2023-01-01 DIAGNOSIS — D64.9 ANEMIA, UNSPECIFIED: ICD-10-CM

## 2023-01-01 DIAGNOSIS — F03.90 UNSPECIFIED DEMENTIA, UNSPECIFIED SEVERITY, WITHOUT BEHAVIORAL DISTURBANCE, PSYCHOTIC DISTURBANCE, MOOD DISTURBANCE, AND ANXIETY: ICD-10-CM

## 2023-01-01 DIAGNOSIS — G31.1 SENILE DEGENERATION OF BRAIN, NOT ELSEWHERE CLASSIFIED: ICD-10-CM

## 2023-01-01 DIAGNOSIS — R53.2 FUNCTIONAL QUADRIPLEGIA: ICD-10-CM

## 2023-01-01 DIAGNOSIS — J96.01 ACUTE RESPIRATORY FAILURE WITH HYPOXIA: ICD-10-CM

## 2023-01-01 LAB
ALBUMIN SERPL ELPH-MCNC: 3.6 G/DL — SIGNIFICANT CHANGE UP (ref 3.3–5)
ALP SERPL-CCNC: 87 U/L — SIGNIFICANT CHANGE UP (ref 40–120)
ALT FLD-CCNC: 7 U/L — LOW (ref 10–45)
ANION GAP SERPL CALC-SCNC: 11 MMOL/L — SIGNIFICANT CHANGE UP (ref 5–17)
ANISOCYTOSIS BLD QL: SLIGHT — SIGNIFICANT CHANGE UP
APTT BLD: 32.2 SEC — SIGNIFICANT CHANGE UP (ref 27.5–35.5)
AST SERPL-CCNC: 18 U/L — SIGNIFICANT CHANGE UP (ref 10–40)
BASOPHILS # BLD AUTO: 0 K/UL — SIGNIFICANT CHANGE UP (ref 0–0.2)
BASOPHILS NFR BLD AUTO: 0 % — SIGNIFICANT CHANGE UP (ref 0–2)
BILIRUB SERPL-MCNC: 0.2 MG/DL — SIGNIFICANT CHANGE UP (ref 0.2–1.2)
BLD GP AB SCN SERPL QL: POSITIVE — SIGNIFICANT CHANGE UP
BUN SERPL-MCNC: 43 MG/DL — HIGH (ref 7–23)
CALCIUM SERPL-MCNC: 9 MG/DL — SIGNIFICANT CHANGE UP (ref 8.4–10.5)
CHLORIDE SERPL-SCNC: 103 MMOL/L — SIGNIFICANT CHANGE UP (ref 96–108)
CO2 SERPL-SCNC: 25 MMOL/L — SIGNIFICANT CHANGE UP (ref 22–31)
CREAT SERPL-MCNC: 0.83 MG/DL — SIGNIFICANT CHANGE UP (ref 0.5–1.3)
EGFR: 67 ML/MIN/1.73M2 — SIGNIFICANT CHANGE UP
EOSINOPHIL # BLD AUTO: 0.12 K/UL — SIGNIFICANT CHANGE UP (ref 0–0.5)
EOSINOPHIL NFR BLD AUTO: 1.7 % — SIGNIFICANT CHANGE UP (ref 0–6)
GIANT PLATELETS BLD QL SMEAR: PRESENT — SIGNIFICANT CHANGE UP
GLUCOSE BLDC GLUCOMTR-MCNC: 136 MG/DL — HIGH (ref 70–99)
GLUCOSE SERPL-MCNC: 133 MG/DL — HIGH (ref 70–99)
HCT VFR BLD CALC: 19.7 % — CRITICAL LOW (ref 34.5–45)
HGB BLD-MCNC: 6.2 G/DL — CRITICAL LOW (ref 11.5–15.5)
HYPOCHROMIA BLD QL: SLIGHT — SIGNIFICANT CHANGE UP
INR BLD: 0.93 — SIGNIFICANT CHANGE UP (ref 0.88–1.16)
LYMPHOCYTES # BLD AUTO: 0.76 K/UL — LOW (ref 1–3.3)
LYMPHOCYTES # BLD AUTO: 10.4 % — LOW (ref 13–44)
MACROCYTES BLD QL: SLIGHT — SIGNIFICANT CHANGE UP
MANUAL SMEAR VERIFICATION: SIGNIFICANT CHANGE UP
MCHC RBC-ENTMCNC: 29.5 PG — SIGNIFICANT CHANGE UP (ref 27–34)
MCHC RBC-ENTMCNC: 31.5 GM/DL — LOW (ref 32–36)
MCV RBC AUTO: 93.8 FL — SIGNIFICANT CHANGE UP (ref 80–100)
MONOCYTES # BLD AUTO: 0.26 K/UL — SIGNIFICANT CHANGE UP (ref 0–0.9)
MONOCYTES NFR BLD AUTO: 3.5 % — SIGNIFICANT CHANGE UP (ref 2–14)
NEUTROPHILS # BLD AUTO: 6.2 K/UL — SIGNIFICANT CHANGE UP (ref 1.8–7.4)
NEUTROPHILS NFR BLD AUTO: 83.5 % — HIGH (ref 43–77)
NEUTS BAND # BLD: 0.9 % — SIGNIFICANT CHANGE UP (ref 0–8)
OVALOCYTES BLD QL SMEAR: SLIGHT — SIGNIFICANT CHANGE UP
PLAT MORPH BLD: ABNORMAL
PLATELET # BLD AUTO: 311 K/UL — SIGNIFICANT CHANGE UP (ref 150–400)
POIKILOCYTOSIS BLD QL AUTO: SLIGHT — SIGNIFICANT CHANGE UP
POLYCHROMASIA BLD QL SMEAR: SLIGHT — SIGNIFICANT CHANGE UP
POTASSIUM SERPL-MCNC: 3.9 MMOL/L — SIGNIFICANT CHANGE UP (ref 3.5–5.3)
POTASSIUM SERPL-SCNC: 3.9 MMOL/L — SIGNIFICANT CHANGE UP (ref 3.5–5.3)
PROT SERPL-MCNC: 6.3 G/DL — SIGNIFICANT CHANGE UP (ref 6–8.3)
PROTHROM AB SERPL-ACNC: 11 SEC — SIGNIFICANT CHANGE UP (ref 10.5–13.4)
RBC # BLD: 2.1 M/UL — LOW (ref 3.8–5.2)
RBC # FLD: 14.7 % — HIGH (ref 10.3–14.5)
RBC BLD AUTO: ABNORMAL
RH IG SCN BLD-IMP: NEGATIVE — SIGNIFICANT CHANGE UP
RH IG SCN BLD-IMP: NEGATIVE — SIGNIFICANT CHANGE UP
SARS-COV-2 RNA SPEC QL NAA+PROBE: NEGATIVE — SIGNIFICANT CHANGE UP
SCHISTOCYTES BLD QL AUTO: SLIGHT — SIGNIFICANT CHANGE UP
SODIUM SERPL-SCNC: 139 MMOL/L — SIGNIFICANT CHANGE UP (ref 135–145)
WBC # BLD: 7.35 K/UL — SIGNIFICANT CHANGE UP (ref 3.8–10.5)
WBC # FLD AUTO: 7.35 K/UL — SIGNIFICANT CHANGE UP (ref 3.8–10.5)

## 2023-01-01 PROCEDURE — 99233 SBSQ HOSP IP/OBS HIGH 50: CPT | Mod: GV

## 2023-01-01 PROCEDURE — 86902 BLOOD TYPE ANTIGEN DONOR EA: CPT

## 2023-01-01 PROCEDURE — 82550 ASSAY OF CK (CPK): CPT

## 2023-01-01 PROCEDURE — 82962 GLUCOSE BLOOD TEST: CPT

## 2023-01-01 PROCEDURE — 99239 HOSP IP/OBS DSCHRG MGMT >30: CPT

## 2023-01-01 PROCEDURE — 84484 ASSAY OF TROPONIN QUANT: CPT

## 2023-01-01 PROCEDURE — 85610 PROTHROMBIN TIME: CPT

## 2023-01-01 PROCEDURE — 99285 EMERGENCY DEPT VISIT HI MDM: CPT | Mod: 25

## 2023-01-01 PROCEDURE — 99232 SBSQ HOSP IP/OBS MODERATE 35: CPT | Mod: GC

## 2023-01-01 PROCEDURE — 86077 PHYS BLOOD BANK SERV XMATCH: CPT

## 2023-01-01 PROCEDURE — P9040: CPT

## 2023-01-01 PROCEDURE — 36430 TRANSFUSION BLD/BLD COMPNT: CPT

## 2023-01-01 PROCEDURE — 82553 CREATINE MB FRACTION: CPT

## 2023-01-01 PROCEDURE — 99497 ADVNCD CARE PLAN 30 MIN: CPT | Mod: 25

## 2023-01-01 PROCEDURE — 85025 COMPLETE CBC W/AUTO DIFF WBC: CPT

## 2023-01-01 PROCEDURE — 86900 BLOOD TYPING SEROLOGIC ABO: CPT

## 2023-01-01 PROCEDURE — 86880 COOMBS TEST DIRECT: CPT

## 2023-01-01 PROCEDURE — 96374 THER/PROPH/DIAG INJ IV PUSH: CPT | Mod: XU

## 2023-01-01 PROCEDURE — 86901 BLOOD TYPING SEROLOGIC RH(D): CPT

## 2023-01-01 PROCEDURE — 96375 TX/PRO/DX INJ NEW DRUG ADDON: CPT | Mod: XU

## 2023-01-01 PROCEDURE — 99223 1ST HOSP IP/OBS HIGH 75: CPT

## 2023-01-01 PROCEDURE — 99285 EMERGENCY DEPT VISIT HI MDM: CPT

## 2023-01-01 PROCEDURE — 99232 SBSQ HOSP IP/OBS MODERATE 35: CPT

## 2023-01-01 PROCEDURE — 99498 ADVNCD CARE PLAN ADDL 30 MIN: CPT | Mod: 25

## 2023-01-01 PROCEDURE — 83880 ASSAY OF NATRIURETIC PEPTIDE: CPT

## 2023-01-01 PROCEDURE — 80053 COMPREHEN METABOLIC PANEL: CPT

## 2023-01-01 PROCEDURE — 86922 COMPATIBILITY TEST ANTIGLOB: CPT

## 2023-01-01 PROCEDURE — 85730 THROMBOPLASTIN TIME PARTIAL: CPT

## 2023-01-01 PROCEDURE — 71275 CT ANGIOGRAPHY CHEST: CPT | Mod: MA

## 2023-01-01 PROCEDURE — 36415 COLL VENOUS BLD VENIPUNCTURE: CPT

## 2023-01-01 PROCEDURE — 71045 X-RAY EXAM CHEST 1 VIEW: CPT

## 2023-01-01 PROCEDURE — 99233 SBSQ HOSP IP/OBS HIGH 50: CPT | Mod: GC

## 2023-01-01 PROCEDURE — 71275 CT ANGIOGRAPHY CHEST: CPT | Mod: 26,MA

## 2023-01-01 PROCEDURE — 87635 SARS-COV-2 COVID-19 AMP PRB: CPT

## 2023-01-01 PROCEDURE — 94640 AIRWAY INHALATION TREATMENT: CPT

## 2023-01-01 PROCEDURE — 86850 RBC ANTIBODY SCREEN: CPT

## 2023-01-01 PROCEDURE — 71045 X-RAY EXAM CHEST 1 VIEW: CPT | Mod: 26

## 2023-01-01 PROCEDURE — 86870 RBC ANTIBODY IDENTIFICATION: CPT

## 2023-01-01 RX ORDER — HALOPERIDOL DECANOATE 100 MG/ML
2 INJECTION INTRAMUSCULAR
Refills: 0 | Status: DISCONTINUED | OUTPATIENT
Start: 2023-01-01 | End: 2023-01-01

## 2023-01-01 RX ORDER — AMLODIPINE BESYLATE 2.5 MG/1
1 TABLET ORAL
Qty: 0 | Refills: 0 | DISCHARGE

## 2023-01-01 RX ORDER — DEXLANSOPRAZOLE 30 MG/1
1 CAPSULE, DELAYED RELEASE ORAL
Qty: 0 | Refills: 0 | DISCHARGE

## 2023-01-01 RX ORDER — ROBINUL 0.2 MG/ML
0.4 INJECTION INTRAMUSCULAR; INTRAVENOUS
Qty: 0 | Refills: 0 | DISCHARGE
Start: 2023-01-01

## 2023-01-01 RX ORDER — HALOPERIDOL DECANOATE 100 MG/ML
2 INJECTION INTRAMUSCULAR EVERY 6 HOURS
Refills: 0 | Status: DISCONTINUED | OUTPATIENT
Start: 2023-01-01 | End: 2023-01-01

## 2023-01-01 RX ORDER — IPRATROPIUM/ALBUTEROL SULFATE 18-103MCG
3 AEROSOL WITH ADAPTER (GRAM) INHALATION ONCE
Refills: 0 | Status: COMPLETED | OUTPATIENT
Start: 2023-01-01 | End: 2023-01-01

## 2023-01-01 RX ORDER — AZITHROMYCIN 500 MG/1
500 TABLET, FILM COATED ORAL ONCE
Refills: 0 | Status: DISCONTINUED | OUTPATIENT
Start: 2023-01-01 | End: 2023-01-01

## 2023-01-01 RX ORDER — FLUOXETINE HCL 10 MG
1 CAPSULE ORAL
Qty: 0 | Refills: 0 | DISCHARGE

## 2023-01-01 RX ORDER — CEFTRIAXONE 500 MG/1
1000 INJECTION, POWDER, FOR SOLUTION INTRAMUSCULAR; INTRAVENOUS ONCE
Refills: 0 | Status: DISCONTINUED | OUTPATIENT
Start: 2023-01-01 | End: 2023-01-01

## 2023-01-01 RX ORDER — HYDROMORPHONE HYDROCHLORIDE 2 MG/ML
1 INJECTION INTRAMUSCULAR; INTRAVENOUS; SUBCUTANEOUS
Refills: 0 | Status: DISCONTINUED | OUTPATIENT
Start: 2023-01-01 | End: 2023-01-01

## 2023-01-01 RX ORDER — HALOPERIDOL DECANOATE 100 MG/ML
1 INJECTION INTRAMUSCULAR
Refills: 0 | Status: DISCONTINUED | OUTPATIENT
Start: 2023-01-01 | End: 2023-01-01

## 2023-01-01 RX ORDER — HALOPERIDOL DECANOATE 100 MG/ML
1 INJECTION INTRAMUSCULAR EVERY 6 HOURS
Refills: 0 | Status: DISCONTINUED | OUTPATIENT
Start: 2023-01-01 | End: 2023-01-01

## 2023-01-01 RX ORDER — FUROSEMIDE 40 MG
30 TABLET ORAL ONCE
Refills: 0 | Status: COMPLETED | OUTPATIENT
Start: 2023-01-01 | End: 2023-01-01

## 2023-01-01 RX ORDER — TRAZODONE HCL 50 MG
0 TABLET ORAL
Qty: 0 | Refills: 0 | DISCHARGE

## 2023-01-01 RX ORDER — HYDROMORPHONE HYDROCHLORIDE 2 MG/ML
1 INJECTION INTRAMUSCULAR; INTRAVENOUS; SUBCUTANEOUS ONCE
Refills: 0 | Status: DISCONTINUED | OUTPATIENT
Start: 2023-01-01 | End: 2023-01-01

## 2023-01-01 RX ORDER — BUPROPION HYDROCHLORIDE 150 MG/1
1 TABLET, EXTENDED RELEASE ORAL
Qty: 0 | Refills: 0 | DISCHARGE

## 2023-01-01 RX ORDER — HYDROMORPHONE HYDROCHLORIDE 2 MG/ML
2 INJECTION INTRAMUSCULAR; INTRAVENOUS; SUBCUTANEOUS EVERY 6 HOURS
Refills: 0 | Status: DISCONTINUED | OUTPATIENT
Start: 2023-01-01 | End: 2023-01-01

## 2023-01-01 RX ORDER — HYDROMORPHONE HYDROCHLORIDE 2 MG/ML
0.5 INJECTION INTRAMUSCULAR; INTRAVENOUS; SUBCUTANEOUS ONCE
Refills: 0 | Status: DISCONTINUED | OUTPATIENT
Start: 2023-01-01 | End: 2023-01-01

## 2023-01-01 RX ORDER — ROSUVASTATIN CALCIUM 5 MG/1
1 TABLET ORAL
Qty: 0 | Refills: 0 | DISCHARGE

## 2023-01-01 RX ORDER — PANTOPRAZOLE SODIUM 20 MG/1
80 TABLET, DELAYED RELEASE ORAL ONCE
Refills: 0 | Status: COMPLETED | OUTPATIENT
Start: 2023-01-01 | End: 2023-01-01

## 2023-01-01 RX ORDER — PANTOPRAZOLE SODIUM 20 MG/1
8 TABLET, DELAYED RELEASE ORAL
Qty: 80 | Refills: 0 | Status: DISCONTINUED | OUTPATIENT
Start: 2023-01-01 | End: 2023-01-01

## 2023-01-01 RX ORDER — ROBINUL 0.2 MG/ML
0.4 INJECTION INTRAMUSCULAR; INTRAVENOUS EVERY 6 HOURS
Refills: 0 | Status: DISCONTINUED | OUTPATIENT
Start: 2023-01-01 | End: 2023-01-01

## 2023-01-01 RX ORDER — OLANZAPINE 15 MG/1
2.5 TABLET, FILM COATED ORAL ONCE
Refills: 0 | Status: COMPLETED | OUTPATIENT
Start: 2023-01-01 | End: 2023-01-01

## 2023-01-01 RX ORDER — QUETIAPINE FUMARATE 200 MG/1
25 TABLET, FILM COATED ORAL EVERY 24 HOURS
Refills: 0 | Status: DISCONTINUED | OUTPATIENT
Start: 2023-01-01 | End: 2023-01-01

## 2023-01-01 RX ORDER — HALOPERIDOL DECANOATE 100 MG/ML
1 INJECTION INTRAMUSCULAR EVERY 12 HOURS
Refills: 0 | Status: DISCONTINUED | OUTPATIENT
Start: 2023-01-01 | End: 2023-01-01

## 2023-01-01 RX ORDER — SENNA PLUS 8.6 MG/1
2 TABLET ORAL AT BEDTIME
Refills: 0 | Status: DISCONTINUED | OUTPATIENT
Start: 2023-01-01 | End: 2023-01-01

## 2023-01-01 RX ORDER — HYDROMORPHONE HYDROCHLORIDE 2 MG/ML
1 INJECTION INTRAMUSCULAR; INTRAVENOUS; SUBCUTANEOUS EVERY 6 HOURS
Refills: 0 | Status: DISCONTINUED | OUTPATIENT
Start: 2023-01-01 | End: 2023-01-01

## 2023-01-01 RX ORDER — POLYETHYLENE GLYCOL 3350 17 G/17G
17 POWDER, FOR SOLUTION ORAL DAILY
Refills: 0 | Status: DISCONTINUED | OUTPATIENT
Start: 2023-01-01 | End: 2023-01-01

## 2023-01-01 RX ORDER — ARMODAFINIL 200 MG/1
1 TABLET ORAL
Qty: 0 | Refills: 0 | DISCHARGE

## 2023-01-01 RX ORDER — TRAZODONE HCL 50 MG
100 TABLET ORAL EVERY 24 HOURS
Refills: 0 | Status: DISCONTINUED | OUTPATIENT
Start: 2023-01-01 | End: 2023-01-01

## 2023-01-01 RX ORDER — ACETAMINOPHEN 500 MG
650 TABLET ORAL EVERY 6 HOURS
Refills: 0 | Status: DISCONTINUED | OUTPATIENT
Start: 2023-01-01 | End: 2023-01-01

## 2023-01-01 RX ORDER — HYDROMORPHONE HYDROCHLORIDE 2 MG/ML
0.5 INJECTION INTRAMUSCULAR; INTRAVENOUS; SUBCUTANEOUS
Refills: 0 | Status: DISCONTINUED | OUTPATIENT
Start: 2023-01-01 | End: 2023-01-01

## 2023-01-01 RX ORDER — FUROSEMIDE 40 MG
40 TABLET ORAL ONCE
Refills: 0 | Status: COMPLETED | OUTPATIENT
Start: 2023-01-01 | End: 2023-01-01

## 2023-01-01 RX ORDER — OMEPRAZOLE 10 MG/1
1 CAPSULE, DELAYED RELEASE ORAL
Qty: 0 | Refills: 0 | DISCHARGE

## 2023-01-01 RX ORDER — ALPRAZOLAM 0.25 MG
0 TABLET ORAL
Qty: 0 | Refills: 0 | DISCHARGE

## 2023-01-01 RX ORDER — HALOPERIDOL DECANOATE 100 MG/ML
1 INJECTION INTRAMUSCULAR
Qty: 0 | Refills: 0 | DISCHARGE
Start: 2023-01-01

## 2023-01-01 RX ORDER — MORPHINE SULFATE 50 MG/1
1 CAPSULE, EXTENDED RELEASE ORAL ONCE
Refills: 0 | Status: DISCONTINUED | OUTPATIENT
Start: 2023-01-01 | End: 2023-01-01

## 2023-01-01 RX ORDER — TRAZODONE HCL 50 MG
1 TABLET ORAL
Qty: 0 | Refills: 0 | DISCHARGE

## 2023-01-01 RX ORDER — HYDROMORPHONE HYDROCHLORIDE 2 MG/ML
2 INJECTION INTRAMUSCULAR; INTRAVENOUS; SUBCUTANEOUS
Refills: 0 | Status: DISCONTINUED | OUTPATIENT
Start: 2023-01-01 | End: 2023-01-01

## 2023-01-01 RX ORDER — ROBINUL 0.2 MG/ML
0.4 INJECTION INTRAMUSCULAR; INTRAVENOUS
Refills: 0 | Status: DISCONTINUED | OUTPATIENT
Start: 2023-01-01 | End: 2023-01-01

## 2023-01-01 RX ORDER — BUPROPION HYDROCHLORIDE 150 MG/1
450 TABLET, EXTENDED RELEASE ORAL EVERY 24 HOURS
Refills: 0 | Status: DISCONTINUED | OUTPATIENT
Start: 2023-01-01 | End: 2023-01-01

## 2023-01-01 RX ORDER — HYDROMORPHONE HYDROCHLORIDE 2 MG/ML
0.5 INJECTION INTRAMUSCULAR; INTRAVENOUS; SUBCUTANEOUS
Qty: 0 | Refills: 0 | DISCHARGE
Start: 2023-01-01

## 2023-01-01 RX ORDER — ROBINUL 0.2 MG/ML
0.4 INJECTION INTRAMUSCULAR; INTRAVENOUS EVERY 4 HOURS
Refills: 0 | Status: DISCONTINUED | OUTPATIENT
Start: 2023-01-01 | End: 2023-01-01

## 2023-01-01 RX ORDER — SCOPALAMINE 1 MG/3D
1 PATCH, EXTENDED RELEASE TRANSDERMAL
Refills: 0 | Status: DISCONTINUED | OUTPATIENT
Start: 2023-01-01 | End: 2023-01-01

## 2023-01-01 RX ADMIN — HYDROMORPHONE HYDROCHLORIDE 0.5 MILLIGRAM(S): 2 INJECTION INTRAMUSCULAR; INTRAVENOUS; SUBCUTANEOUS at 19:17

## 2023-01-01 RX ADMIN — HYDROMORPHONE HYDROCHLORIDE 1 MILLIGRAM(S): 2 INJECTION INTRAMUSCULAR; INTRAVENOUS; SUBCUTANEOUS at 23:39

## 2023-01-01 RX ADMIN — HYDROMORPHONE HYDROCHLORIDE 1 MILLIGRAM(S): 2 INJECTION INTRAMUSCULAR; INTRAVENOUS; SUBCUTANEOUS at 10:00

## 2023-01-01 RX ADMIN — HYDROMORPHONE HYDROCHLORIDE 0.5 MILLIGRAM(S): 2 INJECTION INTRAMUSCULAR; INTRAVENOUS; SUBCUTANEOUS at 10:34

## 2023-01-01 RX ADMIN — HYDROMORPHONE HYDROCHLORIDE 1 MILLIGRAM(S): 2 INJECTION INTRAMUSCULAR; INTRAVENOUS; SUBCUTANEOUS at 15:10

## 2023-01-01 RX ADMIN — Medication 30 MILLIGRAM(S): at 18:43

## 2023-01-01 RX ADMIN — HYDROMORPHONE HYDROCHLORIDE 1 MILLIGRAM(S): 2 INJECTION INTRAMUSCULAR; INTRAVENOUS; SUBCUTANEOUS at 19:44

## 2023-01-01 RX ADMIN — Medication 1 MILLIGRAM(S): at 19:18

## 2023-01-01 RX ADMIN — HYDROMORPHONE HYDROCHLORIDE 1 MILLIGRAM(S): 2 INJECTION INTRAMUSCULAR; INTRAVENOUS; SUBCUTANEOUS at 06:40

## 2023-01-01 RX ADMIN — HALOPERIDOL DECANOATE 2 MILLIGRAM(S): 100 INJECTION INTRAMUSCULAR at 06:11

## 2023-01-01 RX ADMIN — HYDROMORPHONE HYDROCHLORIDE 0.5 MILLIGRAM(S): 2 INJECTION INTRAMUSCULAR; INTRAVENOUS; SUBCUTANEOUS at 14:50

## 2023-01-01 RX ADMIN — HYDROMORPHONE HYDROCHLORIDE 0.5 MILLIGRAM(S): 2 INJECTION INTRAMUSCULAR; INTRAVENOUS; SUBCUTANEOUS at 08:49

## 2023-01-01 RX ADMIN — HYDROMORPHONE HYDROCHLORIDE 0.5 MILLIGRAM(S): 2 INJECTION INTRAMUSCULAR; INTRAVENOUS; SUBCUTANEOUS at 02:10

## 2023-01-01 RX ADMIN — HYDROMORPHONE HYDROCHLORIDE 0.5 MILLIGRAM(S): 2 INJECTION INTRAMUSCULAR; INTRAVENOUS; SUBCUTANEOUS at 09:10

## 2023-01-01 RX ADMIN — HYDROMORPHONE HYDROCHLORIDE 1 MILLIGRAM(S): 2 INJECTION INTRAMUSCULAR; INTRAVENOUS; SUBCUTANEOUS at 08:38

## 2023-01-01 RX ADMIN — HYDROMORPHONE HYDROCHLORIDE 1 MILLIGRAM(S): 2 INJECTION INTRAMUSCULAR; INTRAVENOUS; SUBCUTANEOUS at 06:11

## 2023-01-01 RX ADMIN — HYDROMORPHONE HYDROCHLORIDE 0.5 MILLIGRAM(S): 2 INJECTION INTRAMUSCULAR; INTRAVENOUS; SUBCUTANEOUS at 19:02

## 2023-01-01 RX ADMIN — HYDROMORPHONE HYDROCHLORIDE 1 MILLIGRAM(S): 2 INJECTION INTRAMUSCULAR; INTRAVENOUS; SUBCUTANEOUS at 16:50

## 2023-01-01 RX ADMIN — HALOPERIDOL DECANOATE 2 MILLIGRAM(S): 100 INJECTION INTRAMUSCULAR at 06:28

## 2023-01-01 RX ADMIN — ROBINUL 0.4 MILLIGRAM(S): 0.2 INJECTION INTRAMUSCULAR; INTRAVENOUS at 08:40

## 2023-01-01 RX ADMIN — Medication 3 MILLILITER(S): at 09:09

## 2023-01-01 RX ADMIN — SCOPALAMINE 1 PATCH: 1 PATCH, EXTENDED RELEASE TRANSDERMAL at 11:06

## 2023-01-01 RX ADMIN — Medication 40 MILLIGRAM(S): at 11:06

## 2023-01-01 RX ADMIN — HYDROMORPHONE HYDROCHLORIDE 1 MILLIGRAM(S): 2 INJECTION INTRAMUSCULAR; INTRAVENOUS; SUBCUTANEOUS at 00:34

## 2023-01-01 RX ADMIN — HYDROMORPHONE HYDROCHLORIDE 0.5 MILLIGRAM(S): 2 INJECTION INTRAMUSCULAR; INTRAVENOUS; SUBCUTANEOUS at 05:30

## 2023-01-01 RX ADMIN — HALOPERIDOL DECANOATE 1 MILLIGRAM(S): 100 INJECTION INTRAMUSCULAR at 23:55

## 2023-01-01 RX ADMIN — HYDROMORPHONE HYDROCHLORIDE 1 MILLIGRAM(S): 2 INJECTION INTRAMUSCULAR; INTRAVENOUS; SUBCUTANEOUS at 20:15

## 2023-01-01 RX ADMIN — PANTOPRAZOLE SODIUM 80 MILLIGRAM(S): 20 TABLET, DELAYED RELEASE ORAL at 08:49

## 2023-01-01 RX ADMIN — HALOPERIDOL DECANOATE 1 MILLIGRAM(S): 100 INJECTION INTRAMUSCULAR at 17:31

## 2023-01-01 RX ADMIN — HYDROMORPHONE HYDROCHLORIDE 0.5 MILLIGRAM(S): 2 INJECTION INTRAMUSCULAR; INTRAVENOUS; SUBCUTANEOUS at 01:30

## 2023-01-01 RX ADMIN — HALOPERIDOL DECANOATE 1 MILLIGRAM(S): 100 INJECTION INTRAMUSCULAR at 17:25

## 2023-01-01 RX ADMIN — HYDROMORPHONE HYDROCHLORIDE 0.5 MILLIGRAM(S): 2 INJECTION INTRAMUSCULAR; INTRAVENOUS; SUBCUTANEOUS at 05:45

## 2023-01-01 RX ADMIN — OLANZAPINE 2.5 MILLIGRAM(S): 15 TABLET, FILM COATED ORAL at 09:09

## 2023-01-01 RX ADMIN — HYDROMORPHONE HYDROCHLORIDE 2 MILLIGRAM(S): 2 INJECTION INTRAMUSCULAR; INTRAVENOUS; SUBCUTANEOUS at 12:23

## 2023-01-01 RX ADMIN — Medication 1 MILLIGRAM(S): at 14:00

## 2023-01-01 RX ADMIN — HALOPERIDOL DECANOATE 1 MILLIGRAM(S): 100 INJECTION INTRAMUSCULAR at 06:00

## 2023-01-01 RX ADMIN — HYDROMORPHONE HYDROCHLORIDE 0.5 MILLIGRAM(S): 2 INJECTION INTRAMUSCULAR; INTRAVENOUS; SUBCUTANEOUS at 10:17

## 2023-01-01 RX ADMIN — HYDROMORPHONE HYDROCHLORIDE 0.5 MILLIGRAM(S): 2 INJECTION INTRAMUSCULAR; INTRAVENOUS; SUBCUTANEOUS at 21:53

## 2023-01-01 RX ADMIN — HYDROMORPHONE HYDROCHLORIDE 1 MILLIGRAM(S): 2 INJECTION INTRAMUSCULAR; INTRAVENOUS; SUBCUTANEOUS at 22:42

## 2023-01-01 RX ADMIN — HYDROMORPHONE HYDROCHLORIDE 0.5 MILLIGRAM(S): 2 INJECTION INTRAMUSCULAR; INTRAVENOUS; SUBCUTANEOUS at 22:10

## 2023-01-01 RX ADMIN — HYDROMORPHONE HYDROCHLORIDE 1 MILLIGRAM(S): 2 INJECTION INTRAMUSCULAR; INTRAVENOUS; SUBCUTANEOUS at 06:28

## 2023-01-01 RX ADMIN — ROBINUL 0.4 MILLIGRAM(S): 0.2 INJECTION INTRAMUSCULAR; INTRAVENOUS at 12:23

## 2023-01-01 RX ADMIN — MORPHINE SULFATE 1 MILLIGRAM(S): 50 CAPSULE, EXTENDED RELEASE ORAL at 12:30

## 2023-01-01 RX ADMIN — HYDROMORPHONE HYDROCHLORIDE 1 MILLIGRAM(S): 2 INJECTION INTRAMUSCULAR; INTRAVENOUS; SUBCUTANEOUS at 14:56

## 2023-01-01 RX ADMIN — HYDROMORPHONE HYDROCHLORIDE 0.5 MILLIGRAM(S): 2 INJECTION INTRAMUSCULAR; INTRAVENOUS; SUBCUTANEOUS at 17:28

## 2023-01-01 RX ADMIN — HYDROMORPHONE HYDROCHLORIDE 1 MILLIGRAM(S): 2 INJECTION INTRAMUSCULAR; INTRAVENOUS; SUBCUTANEOUS at 22:26

## 2023-01-01 RX ADMIN — Medication 30 MILLIGRAM(S): at 10:24

## 2023-01-01 RX ADMIN — HYDROMORPHONE HYDROCHLORIDE 1 MILLIGRAM(S): 2 INJECTION INTRAMUSCULAR; INTRAVENOUS; SUBCUTANEOUS at 12:14

## 2023-01-01 RX ADMIN — HYDROMORPHONE HYDROCHLORIDE 1 MILLIGRAM(S): 2 INJECTION INTRAMUSCULAR; INTRAVENOUS; SUBCUTANEOUS at 18:49

## 2023-01-01 RX ADMIN — HYDROMORPHONE HYDROCHLORIDE 0.5 MILLIGRAM(S): 2 INJECTION INTRAMUSCULAR; INTRAVENOUS; SUBCUTANEOUS at 17:13

## 2023-01-01 RX ADMIN — HALOPERIDOL DECANOATE 1 MILLIGRAM(S): 100 INJECTION INTRAMUSCULAR at 06:36

## 2023-01-01 RX ADMIN — HALOPERIDOL DECANOATE 2 MILLIGRAM(S): 100 INJECTION INTRAMUSCULAR at 00:34

## 2023-01-01 RX ADMIN — HALOPERIDOL DECANOATE 1 MILLIGRAM(S): 100 INJECTION INTRAMUSCULAR at 08:31

## 2023-01-01 RX ADMIN — Medication 1 MILLIGRAM(S): at 21:01

## 2023-01-01 RX ADMIN — HYDROMORPHONE HYDROCHLORIDE 1 MILLIGRAM(S): 2 INJECTION INTRAMUSCULAR; INTRAVENOUS; SUBCUTANEOUS at 12:48

## 2023-01-01 RX ADMIN — HYDROMORPHONE HYDROCHLORIDE 1 MILLIGRAM(S): 2 INJECTION INTRAMUSCULAR; INTRAVENOUS; SUBCUTANEOUS at 19:27

## 2023-01-01 RX ADMIN — HALOPERIDOL DECANOATE 1 MILLIGRAM(S): 100 INJECTION INTRAMUSCULAR at 05:30

## 2023-01-01 RX ADMIN — HYDROMORPHONE HYDROCHLORIDE 1 MILLIGRAM(S): 2 INJECTION INTRAMUSCULAR; INTRAVENOUS; SUBCUTANEOUS at 01:00

## 2023-01-01 RX ADMIN — HALOPERIDOL DECANOATE 2 MILLIGRAM(S): 100 INJECTION INTRAMUSCULAR at 14:36

## 2023-01-01 RX ADMIN — HYDROMORPHONE HYDROCHLORIDE 1 MILLIGRAM(S): 2 INJECTION INTRAMUSCULAR; INTRAVENOUS; SUBCUTANEOUS at 09:42

## 2023-01-01 RX ADMIN — HALOPERIDOL DECANOATE 1 MILLIGRAM(S): 100 INJECTION INTRAMUSCULAR at 18:44

## 2023-01-01 RX ADMIN — HYDROMORPHONE HYDROCHLORIDE 1 MILLIGRAM(S): 2 INJECTION INTRAMUSCULAR; INTRAVENOUS; SUBCUTANEOUS at 08:37

## 2023-01-01 RX ADMIN — HYDROMORPHONE HYDROCHLORIDE 0.5 MILLIGRAM(S): 2 INJECTION INTRAMUSCULAR; INTRAVENOUS; SUBCUTANEOUS at 01:15

## 2023-01-01 RX ADMIN — HYDROMORPHONE HYDROCHLORIDE 0.5 MILLIGRAM(S): 2 INJECTION INTRAMUSCULAR; INTRAVENOUS; SUBCUTANEOUS at 13:45

## 2023-01-01 RX ADMIN — HYDROMORPHONE HYDROCHLORIDE 1 MILLIGRAM(S): 2 INJECTION INTRAMUSCULAR; INTRAVENOUS; SUBCUTANEOUS at 08:55

## 2023-01-01 RX ADMIN — HYDROMORPHONE HYDROCHLORIDE 0.5 MILLIGRAM(S): 2 INJECTION INTRAMUSCULAR; INTRAVENOUS; SUBCUTANEOUS at 23:30

## 2023-01-01 RX ADMIN — HYDROMORPHONE HYDROCHLORIDE 1 MILLIGRAM(S): 2 INJECTION INTRAMUSCULAR; INTRAVENOUS; SUBCUTANEOUS at 13:03

## 2023-01-01 RX ADMIN — HYDROMORPHONE HYDROCHLORIDE 1 MILLIGRAM(S): 2 INJECTION INTRAMUSCULAR; INTRAVENOUS; SUBCUTANEOUS at 19:09

## 2023-01-01 RX ADMIN — Medication 1 MILLIGRAM(S): at 23:14

## 2023-01-01 RX ADMIN — HYDROMORPHONE HYDROCHLORIDE 1 MILLIGRAM(S): 2 INJECTION INTRAMUSCULAR; INTRAVENOUS; SUBCUTANEOUS at 12:09

## 2023-01-01 RX ADMIN — HYDROMORPHONE HYDROCHLORIDE 1 MILLIGRAM(S): 2 INJECTION INTRAMUSCULAR; INTRAVENOUS; SUBCUTANEOUS at 06:45

## 2023-01-01 RX ADMIN — HYDROMORPHONE HYDROCHLORIDE 0.5 MILLIGRAM(S): 2 INJECTION INTRAMUSCULAR; INTRAVENOUS; SUBCUTANEOUS at 15:05

## 2023-01-01 RX ADMIN — HYDROMORPHONE HYDROCHLORIDE 0.5 MILLIGRAM(S): 2 INJECTION INTRAMUSCULAR; INTRAVENOUS; SUBCUTANEOUS at 23:14

## 2023-01-01 RX ADMIN — HYDROMORPHONE HYDROCHLORIDE 1 MILLIGRAM(S): 2 INJECTION INTRAMUSCULAR; INTRAVENOUS; SUBCUTANEOUS at 09:05

## 2023-01-01 RX ADMIN — HALOPERIDOL DECANOATE 2 MILLIGRAM(S): 100 INJECTION INTRAMUSCULAR at 23:39

## 2023-01-01 RX ADMIN — HYDROMORPHONE HYDROCHLORIDE 1 MILLIGRAM(S): 2 INJECTION INTRAMUSCULAR; INTRAVENOUS; SUBCUTANEOUS at 23:55

## 2023-01-01 RX ADMIN — HYDROMORPHONE HYDROCHLORIDE 1 MILLIGRAM(S): 2 INJECTION INTRAMUSCULAR; INTRAVENOUS; SUBCUTANEOUS at 16:35

## 2023-01-01 RX ADMIN — HYDROMORPHONE HYDROCHLORIDE 1 MILLIGRAM(S): 2 INJECTION INTRAMUSCULAR; INTRAVENOUS; SUBCUTANEOUS at 19:11

## 2023-03-13 NOTE — PROGRESS NOTE ADULT - PROBLEM SELECTOR PROBLEM 3
Action 4: Continue
Detail Level: Zone
Start Regimen: cosentyx 300mg week 0 (today), 1, 2 , 3, 4 then q 4weeks thereafter\\nTaclonex 0.005 %-0.064 % topical suspension \\nQuantity: 60.0 g  Days Supply: 30\\nSig: Apply to scalp bid x up to two weeks\\n\\nVtama 1 % topical cream \\nQuantity: 60.0 g  Days Supply: 30\\nSig: Aaa over body daily PRN flares
GERD (gastroesophageal reflux disease)

## 2023-03-23 PROBLEM — F03.90 UNSPECIFIED DEMENTIA, UNSPECIFIED SEVERITY, WITHOUT BEHAVIORAL DISTURBANCE, PSYCHOTIC DISTURBANCE, MOOD DISTURBANCE, AND ANXIETY: Chronic | Status: ACTIVE | Noted: 2022-01-01

## 2023-03-23 PROBLEM — G35 MULTIPLE SCLEROSIS: Chronic | Status: ACTIVE | Noted: 2022-01-01

## 2023-03-24 NOTE — H&P ADULT - PROBLEM SELECTOR PLAN 3
CXR on admission showed bilateral opacities/pleural effusions. CT PE showed no PE, however did show bilateral bibasilar focal atelectasis, diffuse increased vascular markings bilaterally, and severe bilateral pleural effusions. Pt was initially saturating at 95% on RA upon ED admission, however became more tachypenic 1 hour into PRBC transfusion and desaturated down to 77% on RA, requiring placement on 15L NRB. Unclear etiology, possibly 2/2 transfusion reaction. Following GOC discussion with family, decision was made to defer repeat CXR and discontinue supplemental O2.  - Dilaudid 0.5 mg q2h PRN for dyspnea or pain  - Glycopyrrolate 0.4 mg IV QID PRN for secretions

## 2023-03-24 NOTE — ED ADULT NURSE NOTE - OBJECTIVE STATEMENT
Pt is an 89yF presenting with abnormal lab H&H as per PCP. Pt caregivers, state she had dark, black, tarry stools and they took her for testing a few days ago. Results showed blood in stool and that she is anemic. Pt requested to come to hospital. Pt is awake and calls for her caregivers but otherwise does not answer questions directly. Unable to determine level of pain but as per caregivers, pt abd hurts.

## 2023-03-24 NOTE — ED PROVIDER NOTE - OBJECTIVE STATEMENT
88 y/o F, hx of dementia, MS, breast CA, found to have new anemia as outpatient, labs 2 days ago w/ 6.8 hgb, and melena in stool. As per caretaker, pt had two loose stools. BM w/ black tarry stool on Wednesday. No BM since. Pt has dementia and does not willingly contribute to questions. Pt yelled out that she could not breathe, approximately 1 hour after arrival. Although, pt denies this later. Attempted to call pt's proxy. No answer at this time with message back. Pt called in by Dr. Zayas which had conversation with daughter. Pt was sent in for transfusion although daughter does not invasive GI workup. She is okay with PPI.

## 2023-03-24 NOTE — ED ADULT NURSE REASSESSMENT NOTE - NS ED NURSE REASSESS COMMENT FT1
Dr. Raygoza and medicine team at bedside, assess patient. patient is agitated, aggressive. sat checked 100% on 15L non-rebreathing mask.

## 2023-03-24 NOTE — H&P ADULT - PROBLEM SELECTOR PLAN 5
History of dementia and prior admissions for agitation/AMS. Agitated on arrival to ED. Per family members, mental status at baseline. Home meds: Seroquel 25 mg PO TID, trazodone 50 mg PO daily, fluoxetine 10 mg PO daily, buproprion 450 mg PO daily, armodafinil 250 mg PO daily   - Haldol 1 mg IVP q6h standing & q2h PRN for agitation   - Ativan 1 mg IV QHS standing & q4h PRN for anxiety   - c/w Trazodone 100 mg PO daily   - c/w Buproprion 450 mg PO daily

## 2023-03-24 NOTE — CONSULT NOTE ADULT - PROBLEM SELECTOR RECOMMENDATION 4
.  Evidence of GIB  -no further transfusions, no endoscopic evaluation to be pursued  -supports limited life expectancy

## 2023-03-24 NOTE — CONSULT NOTE ADULT - ASSESSMENT
·	comfort measures only, MEWS exempt  ·	symptom medications ordered  ·	allow pleasure feeds  ·	transitioned some home medications to IV  ·	intermittently hypoxic and refusing NC, would continue to diurese as appropriate as this will alleviate some symptom burden 88yo F with Dementia, MS, Anxiety/Depression, and h/o Breast CA p/w agitation and found to have acute blood loss anemia c/b fluid overload. Palliative consulted for complex medical decision making in the setting of advanced illness.    ·	comfort measures only, MEWS exempt  ·	symptom medications ordered  ·	allow pleasure feeds  ·	transitioned some home medications to IV  ·	intermittently hypoxic and refusing NC, would continue to diurese as appropriate as this will alleviate some symptom burden

## 2023-03-24 NOTE — ED ADULT NURSE REASSESSMENT NOTE - NS ED NURSE REASSESS COMMENT FT1
At 1209 pt began complaining of SOB.  VS taken and noted to be abnormal.  Pt is admitted, rapid response called on patient.  Blood transfusion immediately stopped on patient as per hospital policy due to tachypnea.  Pt placed on nonrebreather and pulse ox noted to be 100% on 15L via non rebreather.  Rapid response team and patients primary admission team at bedside.  Awaiting further dispo for patient.

## 2023-03-24 NOTE — ED ADULT NURSE REASSESSMENT NOTE - NS ED NURSE REASSESS COMMENT FT1
Family doesn't want - Labs, medication except for pain meds, oxygen - spoke with . confirmed stop transfusion and discard remained blood.

## 2023-03-24 NOTE — PATIENT PROFILE ADULT - NSTRANSFERHEARINGAID_GEN_A_NUR
Dino Al is such a fun, healthy toddler! I love that she likes to play outside and look at books herself! She will be a great big sister this January, congratulations! Covid vaccines can be scheduled in office  Well check at 18 months  Happy summer!!!    1  Anticipatory guidance discussed  Gave handout on well-child issues at this age  Specific topics reviewed: Avoid potential choking hazards (large, spherical, or coin shaped foods), avoid small toys (choking hazard), car seat issues, including proper placement and transition to toddler seat at 20 pounds, caution with possible poisons (including pills, plants, cosmetics), child-proof home with cabinet locks, outlet plugs, window guards, and stair safety harris, discipline issues (limit-setting, positive reinforcement), fluoride supplementation if unfluoridated water supply, importance of varied diet, never leave unattended, observe while eating; consider CPR classes, Poison Control phone number 6-105.114.6248, read together, risk of child pulling down objects on him/herself, set hot water heater less than 120 degrees F, smoke detectors, teach pedestrian safety, toilet training only possible after 3years old, use of transitional object (bravo bear, etc ) to help with sleep, whole milk until 3years old then taper to low-fat or skim and wind-down activities to help with sleep  2  Structured developmental screen completed  Development: Appropriate for age  3  Immunizations today: per orders  History of previous adverse reactions to immunizations? No     4  Follow-up visit in 3 months for next well child visit, or sooner as needed 
bilateral

## 2023-03-24 NOTE — H&P ADULT - NSHPPHYSICALEXAM_GEN_ALL_CORE
T(C): 37.5 (03-24-23 @ 12:13), Max: 37.5 (03-24-23 @ 12:13)  HR: 102 (03-24-23 @ 12:41) (70 - 105)  BP: 141/71 (03-24-23 @ 12:41) (100/60 - 141/71)  RR: 16 (03-24-23 @ 12:41) (16 - 30)  SpO2: 100% (03-24-23 @ 12:41) (77% - 100%)    GENERAL: Elderly female, agitated, combative with staff   HEENT: NC/AT  RESP: Scattered crackles bilaterally   CV: Tachycardic   GI: Refused exam   EXT: WWP  NEURO: Agitated, unable to assess   PSYCH: Unable to assess

## 2023-03-24 NOTE — H&P ADULT - PROBLEM SELECTOR PLAN 1
Admitted for 2 days of melena and Hgb 6.8 on outpatient labs (6.2 in ED), Started on 1u PRBC transfusion in ED, however stopped after 1 hour due to worsening SOB and hypoxia with concern for transfusion reaction. PRBC transfusion stopped.   - Per GOC discussion with family, no more labs, no more transfusions.

## 2023-03-24 NOTE — CONSULT NOTE ADULT - NS ATTEST RISK PROBLEM GEN_ALL_CORE FT
Acute Illness That Poses A Threat To Life  Abrupt Change In Neurological Status  Decision Made To Not Resuscitate (DNR)  Decision Made To De-escalate Care Due To Poor Prognosis  Prescription Of Parenteral Controlled Substances

## 2023-03-24 NOTE — CONSULT NOTE ADULT - PROBLEM SELECTOR RECOMMENDATION 7
.  Patient is DNR/DNI, MOLST in chart  -CMO, MEWS Exempt  -will discuss hospice further with family after the weekend, depending on clinical status will decide between home vs inpatient hospice    In addition to the E/M visit, an advance care planning meeting was performed. Start time: 2:00PM; End time: 3:00PM; Total time: min. A face to face meeting to discuss advance care planning was held today regarding: CHECO RICE. Primary decision maker: Patient is unable to participate in decision making; Alternate/surrogate: Aurelia (daughter). Discussed advance directives including, but not limited to, healthcare proxy and code status. Decision regarding code status: DNR/DNI; Documentation completed today: MOLST Hospice Referral [facility] note

## 2023-03-24 NOTE — ED PROVIDER NOTE - PHYSICAL EXAMINATION
VITAL SIGNS: I have reviewed nursing notes and confirm.  CONSTITUTIONAL: Generally weak and uncooperative; in no acute distress.  SKIN: Agree with RN documentation regarding decubitus evaluation. Remainder of skin exam is warm and dry, no acute rash.  HEAD: Normocephalic; atraumatic.  EYES: PERRL, EOM intact; conjunctiva and sclera clear.  ENT: No nasal discharge; airway clear. Dry mucous membranes.   NECK: Supple; non tender.  CARD: S1, S2 normal; no murmurs, gallops, or rubs. Regular rate and rhythm.  RESP: No wheezes, rales or rhonchi.  ABD: Normal bowel sounds; soft; non-distended; non-tender; no hepatosplenomegaly.  EXT: Normal ROM. No clubbing, cyanosis or edema.  NEURO: Refuses to answer orientation questions. Does not cooperate w/ exam, but no apparent focal deficits.   PSYCH: Cooperative, appropriate.

## 2023-03-24 NOTE — ED ADULT NURSE REASSESSMENT NOTE - NS ED NURSE REASSESS COMMENT FT1
Dr. Kaur and medicine team spoke with family. released restraints, stopped applying O2 requested by family.

## 2023-03-24 NOTE — H&P ADULT - PROBLEM SELECTOR PLAN 4
Admission labs showed Trop 0.08, CKMB 7.1. EKG on admission showed sinus tachycardia, LBBB (not present on prior EKG 12/10/19), LAD, QTc 516, LVH, ?STD V5, TWI aVL, V5-V6, Q waves III, aVF, V3 (old). Concern for myocardial ischemia i/s/o anemia. Aspirin not given due to UGIB.   - No more labs   - No aggressive interventions or life-sustaining treatment

## 2023-03-24 NOTE — H&P ADULT - HISTORY OF PRESENT ILLNESS
CHECO RICE [id number] is a 89 yo Female with PMH of dementia, MS, and breast cancer, who was admitted to ED for Hgb 6.8 seen on outpatient labs and melena in stool. Per caretaker,       , who presents to the ED with       Denies fever, chills, chest pain, palpitations, SOB, cough, abdominal pain, nausea, vomiting, diarrhea, constipation, urinary frequency, urgency, or dysuria, headaches, changes in vision, dizziness, numbness, tingling.  Denies recent travel, recent antibiotic use, or sick contacts.      ED vitals: T , HR , BP , RR , O2 % on RA  ED labs:   ED studies:  ED Interventions:      CHECO RICE [id number] is a 88 yo Female with PMH of dementia, MS, breast cancer, and prior admission (May 2022) for agitation/AMS, who was admitted to ED for low Hgb and melena in stool. Per 24h caretaker, pt had 2 episodes of black tarry stool 2 days prior to admission, associated with fatigue. Outpatient labs showed Hgb 6.8 and pt's PCP Dr. Zayas called pt's daughter and recommended they bring her to the ED for PRBC transfusion.       Per 24h caretaker, pt had 2 episodes of black tarry stool 2 days prior to admission, with outpatient labs showing Hgb 6.8.     Pt has not had a BM since.       , who presents to the ED with       Denies fever, chills, chest pain, palpitations, SOB, cough, abdominal pain, nausea, vomiting, diarrhea, constipation, urinary frequency, urgency, or dysuria, headaches, changes in vision, dizziness, numbness, tingling.  Denies recent travel, recent antibiotic use, or sick contacts.      ED vitals: T , HR , BP , RR , O2 % on RA  ED labs:   ED studies:  ED Interventions:      CHECO RICE [id number] is a 89 yo Female with PMH of dementia, MS, breast cancer, and prior admission (May 2022) for agitation/AMS, who was admitted to ED for melena and low hemoglobin seen on outpatient labs. Per caretaker, pt had 2 episodes of black tarry stool 2 days prior to admission, and appeared more tired than usual. Outpatient labs were drawn which showed Hgb 6.8. Pt's PCP Dr. Zayas called pt's daughter and recommend that she bring her into the ED for pRBC transfusion. Pt has not had a BM since then.     On arrival to ED, pt was agitated and refusing to answer any questions. She was accompanied by her caretakers. Initial vital signs showed T 98.3 F, HR 97, /60, RR 19, SpO2 95% on RA. Labs were significant for Hgb 6.2, BUN 43, Trop 0.08, CKMB 7.1, pro-BNP 2834. EKG showed ischemic changes. CXR showed bilateral opacities/pleural effusions. CT PE showed no PE, however did show bilateral bibasilar focal atelectasis and/or pneumonia, diffuse increased vascular markings bilaterally, and severe bilateral pleural effusions. Pt was given Protonix 80 mg IV x1, Zyprexa 2.5 mg IM x1, Lasix 30 mg IV x1, duo nebs x1, and started on 1u PRBC transfusion due to concern for UGIB as well as MI 2/2 anemia. Extensive discussion was held with family over the phone and it was confirmed that pt is DNR/DNI. Family expressed that pt's quality of life is very poor and they did not want aggressive life-sustaining treatment or intervention at this time. They did want medications that improved the patient's comfort.  CHECO RICE [id number] is a 88 yo Female with PMH of dementia, MS, breast cancer, and prior admission (May 2022) for agitation/AMS, who was admitted to ED for melena and low hemoglobin seen on outpatient labs. Per caretaker, pt had 2 episodes of black tarry stool 2 days prior to admission, and appeared more tired than usual. Outpatient labs were drawn which showed Hgb 6.8. Pt's PCP Dr. Zayas called pt's daughter and recommend that she bring her into the ED for pRBC transfusion. Pt has not had a BM since then.     On arrival to ED, pt was agitated and refusing to answer any questions. She was accompanied by her caretakers. Initial vital signs showed T 98.3 F, HR 97, /60, RR 19, SpO2 95% on RA. Labs were significant for Hgb 6.2, BUN 43, Trop 0.08, CKMB 7.1, pro-BNP 2834. EKG showed ischemic changes. CXR showed bilateral opacities/pleural effusions. CT PE showed no PE, however did show bilateral bibasilar focal atelectasis and/or pneumonia, diffuse increased vascular markings bilaterally, and severe bilateral pleural effusions. Pt was given Protonix 80 mg IV x1, Zyprexa 2.5 mg IM x1, Lasix 30 mg IV x1, duo nebs x1, and started on 1u PRBC transfusion due to concern for UGIB as well as MI 2/2 anemia. Extensive discussion was held with family over the phone and it was confirmed that pt is DNR/DNI. Family expressed that pt's quality of life is very poor and they did not want aggressive life-sustaining treatment or intervention at this time. They did want medications that improved the patient's comfort. Palliative care was consulted in the ED and decision was made to admit the patient to F.     1 hour into blood transfusion, pt began to report SOB and was found to be hypoxic to SpO2 77%. Rapid response was called, blood transfusion was stopped, and pt was placed on 15L NRB with improvement in SpO2 to 100% (see Event Note for additional details). Pt was given morphine 1 mg IV x1 and dilaudid 0.5 mg IV x1 for comfort. Family arrived at bedside shortly afterwards and met with primary Medicine team and Palliative Care team. Decision was made to stop all lab draws, medications except for pain meds, and supplemental oxygen. Transfusion reaction labs were not sent. Pt was made comfort care and admitted to [address] [place] [address]  CHECO RICE [id number] is a 88 yo Female with PMH of dementia, MS, breast cancer, and prior admission (May 2022) for agitation/AMS, who was admitted to ED for melena and low hemoglobin seen on outpatient labs. Per caretaker, pt had 2 episodes of black tarry stool 2 days prior to admission, and appeared more tired than usual. Outpatient labs were drawn which showed Hgb 6.8. Pt's PCP Dr. Zayas called pt's daughter and recommend that she bring her into the ED for pRBC transfusion. Pt has not had a BM since then.     On arrival to ED, pt was agitated and refusing to answer any questions. She was accompanied by her caretakers. Initial vital signs showed T 98.3 F, HR 97, /60, RR 19, SpO2 95% on RA. Labs were significant for Hgb 6.2, BUN 43, Trop 0.08, CKMB 7.1, pro-BNP 2834. EKG showed ischemic changes. CXR showed bilateral opacities/pleural effusions. CT PE showed no PE, however did show bilateral bibasilar focal atelectasis and/or pneumonia, diffuse increased vascular markings bilaterally, and severe bilateral pleural effusions. Pt was given Protonix 80 mg IV x1, Zyprexa 2.5 mg IM x1, Lasix 30 mg IV x1, duo nebs x1, and started on 1u PRBC transfusion due to concern for UGIB as well as MI 2/2 anemia. Extensive discussion was held with family over the phone and it was confirmed that pt is DNR/DNI. Family expressed that pt's quality of life is very poor and they did not want aggressive life-sustaining treatment or intervention at this time. They did want medications that improved the patient's comfort. Palliative care was consulted in the ED and decision was made to admit the patient to F.     1 hour into blood transfusion, pt began to report SOB and was found to be hypoxic to SpO2 77%. Rapid response was called, blood transfusion was stopped, and pt was placed on 15L NRB with improvement in SpO2 to 100% (see Event Note for additional details). Pt was given morphine 1 mg IV x1 and dilaudid 0.5 mg IV x1 for comfort. Family arrived at bedside shortly afterwards and met with primary Medicine team and Palliative Care team. Decision was made to stop all lab draws, medications except for pain meds, and supplemental oxygen. Repeat CXR was not performed and transfusion reaction labs were not sent. Pt was made comfort care and admitted to [address] [place] [address]  CHECO RICE [id number] is a 88 yo Female with PMH of dementia, MS, breast cancer, and prior admission (May 2022) for agitation/AMS, who was admitted to ED for melena and low hemoglobin seen on outpatient labs. Per caretaker, pt had 2 episodes of black tarry stool 2 days prior to admission, and appeared more tired than usual. Outpatient labs were drawn which showed Hgb 6.8. Pt's PCP Dr. Zayas called pt's daughter and recommend that she bring her into the ED for pRBC transfusion. Pt has not had a BM since then.     On arrival to ED, pt was agitated and refusing to answer any questions. She was accompanied by her caretakers. Initial vital signs showed T 98.3 F, HR 97, /60, RR 19, SpO2 95% on RA. Labs were significant for Hgb 6.2, BUN 43, Trop 0.08, CKMB 7.1, pro-BNP 2834. EKG showed ischemic changes. CXR showed bilateral opacities/pleural effusions. CT PE showed no PE, however did show bilateral bibasilar focal atelectasis and/or pneumonia, diffuse increased vascular markings bilaterally, and severe bilateral pleural effusions. Pt was given Protonix 80 mg IV x1, Zyprexa 2.5 mg IM x1, Lasix 30 mg IV x1, duo nebs x1, and started on 1u PRBC transfusion due to concern for UGIB as well as MI 2/2 anemia. Extensive discussion was held with family over the phone and it was confirmed that pt is DNR/DNI. Family expressed that pt's quality of life is very poor and they did not want aggressive life-sustaining treatment or intervention at this time. They did want medications that improved the patient's comfort. Palliative care was consulted in the ED and decision was made to admit the patient to F.     1 hour into blood transfusion in ED, pt began to report SOB and was found to be hypoxic to SpO2 77%. Rapid response was called, blood transfusion was stopped, and pt was placed on 15L NRB with improvement in SpO2 to 100% (see Event Note for additional details). Pt was given morphine 1 mg IV x1 and dilaudid 0.5 mg IV x1 for comfort. Family arrived at bedside shortly afterwards and met with primary Medicine team and Palliative Care team. Decision was made to stop all lab draws, medications except for pain meds, and supplemental oxygen. Repeat CXR was not performed and transfusion reaction labs were not sent. Pt was made comfort care and admitted to [address] [place] [address]  CHECO RICE [id number] is a 89 yo Female with PMH of dementia, MS, breast cancer, and prior admission (May 2022) for agitation/AMS, who was admitted to ED for melena and low hemoglobin seen on outpatient labs. Per caretaker, pt had 2 episodes of black tarry stool 2 days prior to admission, and appeared more tired than usual. Outpatient labs were drawn which showed Hgb 6.8. Pt's PCP Dr. Zayas called pt's daughter and recommend that she bring her into the ED for pRBC transfusion. Pt has not had a BM since then.     On arrival to ED, pt was agitated and refusing to answer any questions. She was accompanied by her caretakers. Initial vital signs showed T 98.3 F, HR 97, /60, RR 19, SpO2 95% on RA. Labs were significant for Hgb 6.2, BUN 43, Trop 0.08, CKMB 7.1, pro-BNP 2834. EKG showed ischemic changes. CXR showed bilateral opacities/pleural effusions. CT PE showed no PE, however did show bilateral bibasilar focal atelectasis and/or pneumonia, diffuse increased vascular markings bilaterally, and severe bilateral pleural effusions. Pt was given Protonix 80 mg IV x1, Zyprexa 2.5 mg IM x1, Lasix 30 mg IV x1, duo nebs x1, and started on 1u PRBC transfusion due to concern for UGIB as well as MI 2/2 anemia. Extensive discussion was held with family over the phone and it was confirmed that pt is DNR/DNI. Family expressed that pt's quality of life is very poor and they did not want aggressive life-sustaining treatment or intervention at this time. They do want medications that improved the patient's comfort. Palliative care was consulted in the ED and decision was made to admit the patient to F.     1 hour into blood transfusion in ED, pt began to report SOB and was found to be hypoxic to SpO2 77%. Rapid response was called, blood transfusion was stopped, and pt was placed on 15L NRB with improvement in SpO2 to 100% (see Event Note for additional details). Pt was given morphine 1 mg IV x1 and dilaudid 0.5 mg IV x1 for comfort. Family arrived at bedside shortly afterwards and met with primary Medicine team and Palliative Care team. Decision was made to stop all lab draws, medications except for pain meds, and supplemental oxygen. Repeat CXR was not performed and transfusion reaction labs were not sent. Pt was made comfort care and admitted to [address] [place] [address]

## 2023-03-24 NOTE — CONSULT NOTE ADULT - PROBLEM SELECTOR RECOMMENDATION 8
.  Complex medical decision making / symptom management in the setting of advanced illness.    Will continue to follow for ongoing GOC discussion / titration of palliative regimen. Emotional support provided, questions answered.  Active Psychosocial Referrals:  [x]Social Work/Case management []PT/OT []Chaplaincy [x]Hospice  []Patient/Family Support []Holistic RN []Massage Therapy []Music Therapy []Ethics  Coping: [] well [x] with difficulty [] poor coping [] unable to assess  Support system: [x] strong [] adequate [] inadequate    For new or uncontrolled symptoms, please call Palliative Care at [phone] ([phone]). The service is available 24/7 (including nights & weekends) to provide symptom management recommendations over the phone as appropriate

## 2023-03-24 NOTE — ED ADULT NURSE REASSESSMENT NOTE - NS ED NURSE REASSESS COMMENT FT1
Received patient. patient is aggitate, aggressive and uncooperative. unable to access IV, made aware Dr. Car. T/F will be running as 2hrs/pack, confirmed by Dr. car.

## 2023-03-24 NOTE — ED PROVIDER NOTE - CLINICAL SUMMARY MEDICAL DECISION MAKING FREE TEXT BOX
88 y/o F, with melena and new anemia. Hgb dropped from 6.8 to 6.2. Will transfuse and initiate Protonix. As effusion on xray, will give Lasix w/ transfusion and further image chest to eval for size of effusion. Will also r/o PNA, malignancy, and PE. Will admit to medicine. 89 y/o F, with melena and new anemia. Hgb dropped from 6.8 to 6.2. Will transfuse and initiate Protonix. As effusion on xray, will give Lasix w/ transfusion and further image chest to eval for size of effusion. Will also r/o PNA, malignancy, and PE. Will admit to medicine.    Patient's work-up with with anemia as expected, dropped from 6.8-6.2 since Wednesday with no bowel movement since then, suspect bleed stopped versus slowed.  Likely upper GI bleed.  Gave Protonix and 1 transfusion of PRBCs, I suspect patient with MI secondary to anemia.  EKG with ischemic changes.  Difficult to obtain good baseline on EKG as patient agitated and intentionally moving throughout EKG attempts.  I did not give aspirin due to concern for GI bleed.patient with abnormal x-ray of chest CT with confirmed bilateral pleural effusions, and question of atelectasis versus pneumonia, suspect more likely atelectasis however as cannot rule out gave antibiotics.  After this initial treatment as described above, I was able to get in touch with patient's family who were initially unreachable, I discussed with both daughters Aurelia the proxy and Rocio akers goals of care.  Patient is DNR/DNI.  At this point family expresses that patient's quality of life is very poor and family does not want any life-sustaining treatment at this time.  Okay with medications that increase patient's comfort.  I patient's family does not want cardiac interventions or procedures such as colonoscopy.  No aggressive life-saving treatment.  Expressed they are okay with antibiotics and the PPI given.  I discussed with family that we would consult palliative care for possible hospice which they are in agreement with.  They are not sure at this time whether they would prefer inpatient setting versus extra help at home, patient currently has a 24-hour health aide.

## 2023-03-24 NOTE — CONSULT NOTE ADULT - PROBLEM SELECTOR RECOMMENDATION 2
.  -Haldol 1mg IV q12h ATC  -Ativan 1mg IV qhs  -Haldol 1mg IV q2h PRN for Agitation  -Ativan 1mg IV q4h PRN for Anxiety    Replacing home Seroquel and Trazadone with Haldol

## 2023-03-24 NOTE — PATIENT PROFILE ADULT - FALL HARM RISK - HARM RISK INTERVENTIONS

## 2023-03-24 NOTE — ED ADULT NURSE NOTE - NSFALLRSKUNASSIST_ED_ALL_ED
[No Acute Distress] : no acute distress [Well Nourished] : well nourished [Well Developed] : well developed [Well-Appearing] : well-appearing [Normal Sclera/Conjunctiva] : normal sclera/conjunctiva [PERRL] : pupils equal round and reactive to light [EOMI] : extraocular movements intact [Normal Outer Ear/Nose] : the outer ears and nose were normal in appearance [Normal Oropharynx] : the oropharynx was normal [No JVD] : no jugular venous distention [No Lymphadenopathy] : no lymphadenopathy [Supple] : supple [Thyroid Normal, No Nodules] : the thyroid was normal and there were no nodules present [No Respiratory Distress] : no respiratory distress  [No Accessory Muscle Use] : no accessory muscle use [Clear to Auscultation] : lungs were clear to auscultation bilaterally [Normal Rate] : normal rate  [Regular Rhythm] : with a regular rhythm [Normal S1, S2] : normal S1 and S2 [No Murmur] : no murmur heard [No Carotid Bruits] : no carotid bruits [No Abdominal Bruit] : a ~M bruit was not heard ~T in the abdomen [No Varicosities] : no varicosities [Pedal Pulses Present] : the pedal pulses are present [No Edema] : there was no peripheral edema [No Palpable Aorta] : no palpable aorta [No Extremity Clubbing/Cyanosis] : no extremity clubbing/cyanosis [Soft] : abdomen soft [Non Tender] : non-tender [Non-distended] : non-distended no [No Masses] : no abdominal mass palpated [No HSM] : no HSM [Normal Bowel Sounds] : normal bowel sounds [Normal Posterior Cervical Nodes] : no posterior cervical lymphadenopathy [Normal Anterior Cervical Nodes] : no anterior cervical lymphadenopathy [No CVA Tenderness] : no CVA  tenderness [No Spinal Tenderness] : no spinal tenderness [No Joint Swelling] : no joint swelling [Grossly Normal Strength/Tone] : grossly normal strength/tone [No Rash] : no rash [Coordination Grossly Intact] : coordination grossly intact [No Focal Deficits] : no focal deficits [Normal Gait] : normal gait [Deep Tendon Reflexes (DTR)] : deep tendon reflexes were 2+ and symmetric [Normal Affect] : the affect was normal [Normal Insight/Judgement] : insight and judgment were intact

## 2023-03-24 NOTE — H&P ADULT - ASSESSMENT
89 F w/ PMH dementia, MS, breast cancer, and prior admission for agitation/AMS, who was admitted for melena and low Hgb seen on outpatient labs, course c/b acute hypoxic respiratory failure during PRBC transfusion in ED, now comfort care.

## 2023-03-24 NOTE — ED ADULT NURSE NOTE - NSIMPLEMENTINTERV_GEN_ALL_ED
Implemented All Fall with Harm Risk Interventions:  Ash to call system. Call bell, personal items and telephone within reach. Instruct patient to call for assistance. Room bathroom lighting operational. Non-slip footwear when patient is off stretcher. Physically safe environment: no spills, clutter or unnecessary equipment. Stretcher in lowest position, wheels locked, appropriate side rails in place. Provide visual cue, wrist band, yellow gown, etc. Monitor gait and stability. Monitor for mental status changes and reorient to person, place, and time. Review medications for side effects contributing to fall risk. Reinforce activity limits and safety measures with patient and family. Provide visual clues: red socks.

## 2023-03-24 NOTE — CONSULT NOTE ADULT - PROBLEM SELECTOR RECOMMENDATION 6
.  FAST 7C+, bedbound  -given respiratory failure, acute blood loss anemia, and volume overload, patient is appropriate for hospice

## 2023-03-24 NOTE — H&P ADULT - PROBLEM SELECTOR PLAN 7
F: PO  E: No more labs   N: Regular diet   GI ppx: None  DVT ppx: None  Code status: DNR/DNI  Dispo: 7W

## 2023-03-24 NOTE — H&P ADULT - ATTENDING COMMENTS
89 yo Female with PMH of dementia, MS, breast cancer, and prior admission (May 2022) for agitation/AMS, who was admitted to ED for melena and low hemoglobin seen on outpatient labs.  GOC conversation in ED that patient amenable to blood transfusion but no escalation or care.  DNR/DNI.  During transfusion patient with acute hypoxic respiratory failure likely due to fluid overload after transfusion.    Patient seen and examined at bedside at time of rapid.  Agitated but hemodynamically stable and comfortable on non rebreather.  Re-evaluated after pain medications and patient calm, comfortable, family present at bedside    GOC with palliative and admitting resident, patient is comfort care, MEWs exempt, DNR/DNI.  No lab draws  Plan of medications directed at symptoms for comfort only    #Acute hypoxic respiratory failure - likely due to fluid overload in transfusion.  No escalation beyond NC per GOC  #Blood loss anemia - likely GI bleed, no interventions within GOC, no need to trend Hgb, no transfusion    #Dementia  #Multiple sclerosis  #Functional Quadriplegia  - dependent on aide for all ADLs    Remainder of plan as above

## 2023-03-24 NOTE — CONSULT NOTE ADULT - CONVERSATION DETAILS
Reviewed patient's hospital course and interval developments. Discussed advanced directives including code status, HCP completion, and hospice eligibility. Family does not wish to pursue aggressive/invasive measures. If patient is decompensating, then allow a natural disease process to unfold. Symptom-directed care, MEWS exempt, comfort measures only.    Introduced the role of hospice services and delineated the benefits provided. Differentiated inpatient vs home hospice and explained the intended philosophy of care. Family is amenable to referral, will re-evaluate patient on Monday for home vs inpatient hospice depending on clinical scenario.

## 2023-03-24 NOTE — H&P ADULT - PROBLEM SELECTOR PLAN 6
Family wishes for pt to be DNR/DNI. They do not want aggressive life-sustaining treatment or intervention at this time. They do want medications that improve the patient's comfort.   - No more lab draws   - No more medications aside from pain & psych meds   - No more supplemental O2   - f/u palliative care recs

## 2023-03-24 NOTE — CONSULT NOTE ADULT - SUBJECTIVE AND OBJECTIVE BOX
[facility] Geriatrics and Palliative Care  Vincent Patel, Palliative Care Attending  Contact Info: Call [phone] (HEAL Line) or message on Microsoft Teams (Vincent Patel)    HPI:  CHECO RICE [id number] is a 89 yo Female with PMH of dementia, MS, breast cancer, and prior admission (May 2022) for agitation/AMS, who was admitted to ED for melena and low hemoglobin seen on outpatient labs. Per caretaker, pt had 2 episodes of black tarry stool 2 days prior to admission, and appeared more tired than usual. Outpatient labs were drawn which showed Hgb 6.8. Pt's PCP Dr. Zayas called pt's daughter and recommend that she bring her into the ED for pRBC transfusion. Pt has not had a BM since then.     On arrival to ED, pt was agitated and refusing to answer any questions. She was accompanied by her caretakers. Initial vital signs showed T 98.3 F, HR 97, /60, RR 19, SpO2 95% on RA. Labs were significant for Hgb 6.2, BUN 43, Trop 0.08, CKMB 7.1, pro-BNP 2834. EKG showed ischemic changes. CXR showed bilateral opacities/pleural effusions. CT PE showed no PE, however did show bilateral bibasilar focal atelectasis and/or pneumonia, diffuse increased vascular markings bilaterally, and severe bilateral pleural effusions. Pt was given Protonix 80 mg IV x1, Zyprexa 2.5 mg IM x1, Lasix 30 mg IV x1, duo nebs x1, and started on 1u PRBC transfusion due to concern for UGIB as well as MI 2/2 anemia. Extensive discussion was held with family over the phone and it was confirmed that pt is DNR/DNI. Family expressed that pt's quality of life is very poor and they did not want aggressive life-sustaining treatment or intervention at this time. They did want medications that improved the patient's comfort. Palliative care was consulted in the ED and decision was made to admit the patient to [facility].     1 hour into blood transfusion in ED, pt began to report SOB and was found to be hypoxic to SpO2 77%. Rapid response was called, blood transfusion was stopped, and pt was placed on 15L NRB with improvement in SpO2 to 100% (see Event Note for additional details). Pt was given morphine 1 mg IV x1 and dilaudid 0.5 mg IV x1 for comfort. Family arrived at bedside shortly afterwards and met with primary Medicine team and Palliative Care team. Decision was made to stop all lab draws, medications except for pain meds, and supplemental oxygen. Repeat CXR was not performed and transfusion reaction labs were not sent. (24 Mar 2023 13:21)    Patient seen and examined at bedside. Appears overtly comfortable. Denies any significant complaint. Comprehensive symptom assessment and GOC exploration as noted below. Further collateral obtained from primary team, chart, and family.    PERTINENT PM/SXH:   Hypertension    Fall at home    Breast cancer    Multiple sclerosis    Dementia      Bilateral bunions    History of lymph node excision      FAMILY HISTORY:  Known health problems: none      No history of  in first degree relatives  Unable to obtain due to encephalopathy    ITEMS NOT CHECKED ARE NOT PRESENT  SOCIAL HISTORY:   Significant other/partner:  []  Children:  []  Substance hx:  []   Tobacco hx:  []   Alcohol hx: []   Home Opioid hx:  [] I-Stop Reference No:  - no active Rx's / see chart note  Living Situation: []Home  []Long term care  []Rehab []Other  Moravian/Spiritual practice: ; Role of organized Buddhism [] important [] some [] unable to assess  Coping: [] well [] with difficulty [] poor coping [] unable to assess  Support system: [] strong [] adequate [] inadequate    ADVANCE DIRECTIVES:    []MOLST  []Living Will  DECISION MAKER(s):  [] Health Care Proxy(s)  [] Surrogate(s)  [] Guardian           Name(s)/Phone Number(s):     BASELINE (I)ADLs (prior to admission):  [place]: []Total  [] Moderate []Dependent    ALLERGIES:  No Known Allergies    MEDICATIONS  (STANDING):  buPROPion XL (24-Hour) . 450 milliGRAM(s) Oral every 24 hours  polyethylene glycol 3350 17 Gram(s) Oral daily  QUEtiapine 25 milliGRAM(s) Oral every 24 hours  senna 2 Tablet(s) Oral at bedtime  traZODone 100 milliGRAM(s) Oral every 24 hours    MEDICATIONS  (PRN):  acetaminophen     Tablet .. 650 milliGRAM(s) Oral every 6 hours PRN Temp greater or equal to 38C (100.4F), Mild Pain (1 - 3), Moderate Pain (4 - 6)  glycopyrrolate Injectable 0.4 milliGRAM(s) IV Push four times a day PRN Secretions  haloperidol    Injectable 1 milliGRAM(s) IV Push every 2 hours PRN Agitation  HYDROmorphone  Injectable 0.5 milliGRAM(s) IV Push every 2 hours PRN Moderate pain (4-6), Severe pain (7-10), Respiratory rate greater than 22  LORazepam   Injectable 0.5 milliGRAM(s) IV Push every 4 hours PRN Anxiety    Analgesic Use (Scheduled and PRNs) for past 24 hours:    HYDROmorphone  Injectable   0.5 milliGRAM(s) IV Push (03-24-23 @ 13:45)    morphine  - Injectable   1 milliGRAM(s) IV Push (03-24-23 @ 12:30)    OLANZapine   2.5 milliGRAM(s) Oral (03-24-23 @ 09:09)      PRESENT SYMPTOMS: []Unable to obtain due to poor mentation/encephalopathy  Source if other than patient:  []Family   []Team     Pain: [] yes [] no  QOL impact -   Location -                    Aggravating Factors -  Quality -  Radiation -  Timing -  Severity (0-10 scale) -   Minimal Acceptable Level (0-10 scale) -    CPOT Score:  (Critical Care Pain Assessment)    PAIN AD Score:  (Nonverbal Pain Assessment)    Dyspnea:                           []Mild  []Moderate []Severe  Anxiety:                             []Mild []Moderate []Severe  Fatigue:                             []Mild []Moderate []Severe  Nausea:                             []Mild []Moderate []Severe  Loss of Appetite:              []Mild []Moderate []Severe  Constipation:                    []Mild []Moderate []Severe    Other Symptoms:  [x]All other review of systems negative     Palliative Performance Status Version 2:  %  (Functional Assessment Tool)    GENERAL:  [] NAD []Alert []Lethargic  []Cachexia  []Unarousable  []Verbal  []Non-Verbal  BEHAVIORAL:   []Anxiety  []Delirium []Agitation []Cooperative []Oriented x  HEENT:  []Normal  [] Moist Mucous Membranes []Dry mouth   []ET Tube/Trach  []Oral lesions  PULMONARY:   []Clear []Tachypnea  []Audible excessive secretions  []Normal Work of Breathing []Labored Breathing  []Rhonchi []Crackles []Wheezing  CARDIOVASCULAR:    []Regular Rate []Regular Rhythm []Irregular []Tachy  []Waylon  GASTROINTESTINAL:  []Soft  []Distended   []+BS  []Non tender []Tender  []PEG []OGT/ NGT  Last BM:  GENITOURINARY:  []Normal [] Incontinent   []Oliguria/Anuria   []Srinivasan  MUSCULOSKELETAL:   []Normal Extremities  []Weakness  []Bed/Wheelchair bound []Edema  NEUROLOGIC:   []No focal deficits  []Cognitive impairment  []Dysphagia []Dysarthria []Paresis []Encephalopathic  SKIN:   []Normal   []Pressure ulcer(s)  []Rash    CRITICAL CARE:  [ ]Shock Present  [ ]Septic [ ]Cardiogenic [ ]Neurologic [ ]Hypovolemic  [ ] Vasopressors [ ]Inotropes   [ ]Respiratory failure present [ ]Mechanical Ventilation [ ]Non-invasive ventilatory support [ ]High-Flow  [ ]Acute  [ ]Chronic [ ]Hypoxic  [ ]Hypercarbic   [ ]Other organ failure    Vital Signs Last 24 Hrs  T(C): 37.5 (24 Mar 2023 12:13), Max: 37.5 (24 Mar 2023 12:13)  T(F): 99.5 (24 Mar 2023 12:13), Max: 99.5 (24 Mar 2023 12:13)  HR: 102 (24 Mar 2023 12:41) (70 - 105)  BP: 141/71 (24 Mar 2023 12:41) (100/60 - 141/71)  BP(mean): --  RR: 16 (24 Mar 2023 12:41) (16 - 30)  SpO2: 100% (24 Mar 2023 12:41) (77% - 100%)    Parameters below as of 24 Mar 2023 12:41  Patient On (Oxygen Delivery Method): mask, nonrebreather  O2 Flow (L/min): 15       LABS:                        6.2    7.35  )-----------( 311      ( 24 Mar 2023 07:36 )             19.7   03-24    139  |  103  |  43<H>  ----------------------------<  133<H>  3.9   |  25  |  0.83    Ca    9.0      24 Mar 2023 07:36    TPro  6.3  /  Alb  3.6  /  TBili  0.2  /  DBili  x   /  AST  18  /  ALT  7<L>  /  AlkPhos  87  03-24    RADIOLOGY & ADDITIONAL STUDIES:    REFERRALS:  [x]Social Work  []Case management []PT/OT []Chaplaincy  []Hospice  []Patient/Family Support []Massage Therapy []Music Therapy []Holistic RN    DISCUSSION OF CASE: Family - to provide updates and emotional support; - [facility] Geriatrics and Palliative Care  Vincent Patel, Palliative Care Attending  Contact Info: Call [phone] (HEAL Line) or message on [employer] Teams (Vincent Patel)    HPI:  CHECO RICE [id number] is a 89 yo Female with PMH of dementia, MS, breast cancer, and prior admission (May 2022) for agitation/AMS, who was admitted to ED for melena and low hemoglobin seen on outpatient labs. Per caretaker, pt had 2 episodes of black tarry stool 2 days prior to admission, and appeared more tired than usual. Outpatient labs were drawn which showed Hgb 6.8. Pt's PCP Dr. Zayas called pt's daughter and recommend that she bring her into the ED for pRBC transfusion. Pt has not had a BM since then.     On arrival to ED, pt was agitated and refusing to answer any questions. She was accompanied by her caretakers. Initial vital signs showed T 98.3 F, HR 97, /60, RR 19, SpO2 95% on RA. Labs were significant for Hgb 6.2, BUN 43, Trop 0.08, CKMB 7.1, pro-BNP 2834. EKG showed ischemic changes. CXR showed bilateral opacities/pleural effusions. CT PE showed no PE, however did show bilateral bibasilar focal atelectasis and/or pneumonia, diffuse increased vascular markings bilaterally, and severe bilateral pleural effusions. Pt was given Protonix 80 mg IV x1, Zyprexa 2.5 mg IM x1, Lasix 30 mg IV x1, duo nebs x1, and started on 1u PRBC transfusion due to concern for UGIB as well as MI 2/2 anemia. Extensive discussion was held with family over the phone and it was confirmed that pt is DNR/DNI. Family expressed that pt's quality of life is very poor and they did not want aggressive life-sustaining treatment or intervention at this time. They did want medications that improved the patient's comfort. Palliative care was consulted in the ED and decision was made to admit the patient to [facility].     1 hour into blood transfusion in ED, pt began to report SOB and was found to be hypoxic to SpO2 77%. Rapid response was called, blood transfusion was stopped, and pt was placed on 15L NRB with improvement in SpO2 to 100% (see Event Note for additional details). Pt was given morphine 1 mg IV x1 and dilaudid 0.5 mg IV x1 for comfort. Family arrived at bedside shortly afterwards and met with primary Medicine team and Palliative Care team. Decision was made to stop all lab draws, medications except for pain meds, and supplemental oxygen. Repeat CXR was not performed and transfusion reaction labs were not sent. (24 Mar 2023 13:21)    Patient seen and examined at bedside. Appears overtly comfortable. Intermittently agitated, unable to participate in interview. Comprehensive symptom assessment and GOC exploration as noted below. Further collateral obtained from primary team, chart, and family.    PERTINENT PM/SXH:   Hypertension  Fall at home  Breast cancer  Multiple sclerosis  Dementia  Bilateral bunions  History of lymph node excision    FAMILY HISTORY:  Known health problems: none  No history of CHF in first degree relatives  Unable to obtain due to encephalopathy    ITEMS NOT CHECKED ARE NOT PRESENT  SOCIAL HISTORY:   Significant other/partner:  []  Children:  [x]  Substance hx:  []   Tobacco hx:  []   Alcohol hx: []   Home Opioid hx:  [] I-Stop Reference No:  - no active Rx's  Living Situation: [x]Home  []Long term care  []Rehab []Other  Christianity/Spiritual practice: ; Role of organized Bahai [] important [] some [] unable to assess  Coping: [] well [x] with difficulty [] poor coping [] unable to assess  Support system: [x] strong [] adequate [] inadequate    ADVANCE DIRECTIVES:    [x]MOLST  []Living Will  DECISION MAKER(s):  [x] Health Care Proxy(s)  [] Surrogate(s)  [] Guardian           Name(s)/Phone Number(s): Aurelia [phone]    BASELINE (I)ADLs (prior to admission):  [place]: []Total  [x] Moderate []Dependent    ALLERGIES:  No Known Allergies    MEDICATIONS  (STANDING):  buPROPion XL (24-Hour) . 450 milliGRAM(s) Oral every 24 hours  polyethylene glycol 3350 17 Gram(s) Oral daily  QUEtiapine 25 milliGRAM(s) Oral every 24 hours  senna 2 Tablet(s) Oral at bedtime  traZODone 100 milliGRAM(s) Oral every 24 hours    MEDICATIONS  (PRN):  acetaminophen     Tablet .. 650 milliGRAM(s) Oral every 6 hours PRN Temp greater or equal to 38C (100.4F), Mild Pain (1 - 3), Moderate Pain (4 - 6)  glycopyrrolate Injectable 0.4 milliGRAM(s) IV Push four times a day PRN Secretions  haloperidol    Injectable 1 milliGRAM(s) IV Push every 2 hours PRN Agitation  HYDROmorphone  Injectable 0.5 milliGRAM(s) IV Push every 2 hours PRN Moderate pain (4-6), Severe pain (7-10), Respiratory rate greater than 22  LORazepam   Injectable 0.5 milliGRAM(s) IV Push every 4 hours PRN Anxiety    Analgesic Use (Scheduled and PRNs) for past 24 hours:  HYDROmorphone  Injectable   0.5 milliGRAM(s) IV Push (03-24-23 @ 13:45)  morphine  - Injectable   1 milliGRAM(s) IV Push (03-24-23 @ 12:30)  OLANZapine   2.5 milliGRAM(s) Oral (03-24-23 @ 09:09)    PRESENT SYMPTOMS: []Unable to obtain due to poor mentation/encephalopathy  Source if other than patient:  []Family   []Team     Pain: [] yes [x] no  QOL impact -   Location -                    Aggravating Factors -  Quality -  Radiation -  Timing -  Severity (0-10 scale) -   Minimal Acceptable Level (0-10 scale) -    PAIN AD Score: 2  (Nonverbal Pain Assessment)    Dyspnea:                           []Mild  []Moderate []Severe  Anxiety:                             []Mild []Moderate []Severe  Fatigue:                             []Mild []Moderate []Severe  Nausea:                             []Mild []Moderate []Severe  Loss of Appetite:              []Mild []Moderate []Severe  Constipation:                    []Mild []Moderate []Severe    Other Symptoms:  [x]All other review of systems negative     Palliative Performance Status Version 2:  40%  (Functional Assessment Tool)    GENERAL:  [] NAD [x]Alert []Lethargic  []Cachexia  []Unarousable  [x]Verbal  []Non-Verbal  BEHAVIORAL:   []Anxiety  [x]Delirium [x]Agitation []Cooperative [x]Oriented x1  HEENT:  [x]Normal  [x] Moist Mucous Membranes []Dry mouth   []ET Tube/Trach  []Oral lesions  PULMONARY:   []Clear []Tachypnea  []Audible excessive secretions  []Normal Work of Breathing [x]Labored Breathing  []Rhonchi [x]Crackles []Wheezing  CARDIOVASCULAR:    [x]Regular Rate [x]Regular Rhythm []Irregular []Tachy  []Waylon  GASTROINTESTINAL:  [x]Soft  []Distended   [x]+BS  [x]Non tender []Tender  []PEG []OGT/ NGT  Last BM:  GENITOURINARY:  []Normal [x] Incontinent   []Oliguria/Anuria   []Srinivasan  MUSCULOSKELETAL:   []Normal Extremities  [x]Weakness  [x]Bed/Wheelchair bound []Edema  NEUROLOGIC:   []No focal deficits  [x]Cognitive impairment  []Dysphagia []Dysarthria []Paresis [x]Encephalopathic  SKIN:   [x]Normal   []Pressure ulcer(s)  []Rash    CRITICAL CARE:  [ ]Shock Present  [ ]Septic [ ]Cardiogenic [ ]Neurologic [ ]Hypovolemic  [ ] Vasopressors [ ]Inotropes   [x]Respiratory failure present [ ]Mechanical Ventilation [ ]Non-invasive ventilatory support [ ]High-Flow  [x]Acute  [ ]Chronic [x]Hypoxic  [ ]Hypercarbic   [ ]Other organ failure    Vital Signs Last 24 Hrs  T(C): 37.5 (24 Mar 2023 12:13), Max: 37.5 (24 Mar 2023 12:13)  T(F): 99.5 (24 Mar 2023 12:13), Max: 99.5 (24 Mar 2023 12:13)  HR: 102 (24 Mar 2023 12:41) (70 - 105)  BP: 141/71 (24 Mar 2023 12:41) (100/60 - 141/71)  BP(mean): --  RR: 16 (24 Mar 2023 12:41) (16 - 30)  SpO2: 100% (24 Mar 2023 12:41) (77% - 100%)    Parameters below as of 24 Mar 2023 12:41  Patient On (Oxygen Delivery Method): mask, nonrebreather  O2 Flow (L/min): 15       LABS:                        6.2    7.35  )-----------( 311      ( 24 Mar 2023 07:36 )             19.7   03-24    139  |  103  |  43<H>  ----------------------------<  133<H>  3.9   |  25  |  0.83    Ca    9.0      24 Mar 2023 07:36  TPro  6.3  /  Alb  3.6  /  TBili  0.2  /  DBili  x   /  AST  18  /  ALT  7<L>  /  AlkPhos  87  03-24    RADIOLOGY & ADDITIONAL STUDIES:  < from: CT Angio Chest PE Protocol w/ IV Cont (03.24.23 @ 10:03) >  LUNGS AND AIRWAYS: Bilateral, bibasilar focal atelectasis and/or pneumonia. Diffuse increased vascular markings bilaterally with areas of airspace haziness, cannot exclude superimposed congestive heart failure.  PLEURA: Severe bilateral pleural effusions  MEDIASTINUM AND NATALIE: No lymphadenopathy.  VESSELS: No pulmonary embolism. No thoracic aortic dissection or aneurysm.  HEART: Heart size is normal. No pericardial effusion.  CHEST WALL AND LOWER NECK: Mild anasarca  VISUALIZED UPPER ABDOMEN: Hiatal hernia  BONES: Degenerative changes    IMPRESSION:  1.  No pulmonary embolism.  2.  Bilateral, bibasilar focal atelectasis and/or pneumonia. Diffuse increased vascular markings bilaterally with areas of airspace haziness, cannot exclude superimposed congestive heart failure.  3.  Severe bilateral pleural effusions    REFERRALS:  [x]Social Work  []Case management []PT/OT []Chaplaincy  []Hospice  []Patient/Family Support []Massage Therapy []Music Therapy []Holistic RN    DISCUSSION OF CASE: Daughter - to provide updates and emotional support; Medicine - to discuss plan of care

## 2023-03-24 NOTE — CHART NOTE - NSCHARTNOTEFT_GEN_A_CORE
Summary of Events: Approximately 1 hour into pRBC transfusion, pt developed hypoxia to 77, for which rapid response was called ~12:10pm. Pt was placed on 15L NRB, blood transfusion was stopped. On arrival, she was agitated, thrashing in bed and expressing frustration with staff.     Vitals: T 99.5, /79, , RR 30, O2 sat 77 -> 100% on 15L NRB. Repeat vitals: 1) 12:21 pm: /111  RR 30. 2) 12:38pm: 141/71  RR 30. Daughter was called by ED staff, who said she was on her way.   Exam:   General: agitated, combative with staff  Lungs: predominantly CTAB with scattered crackles  Cardiac: tachycardia  GI: pt denied any abd pain. Rest of exam declined by patient.  Extremities: warm  Labs: POCT glucose 136. Labwork including transfusion reaction workup held per primary team and ED discussion pending further clarification of GOC with family.    Imaging: pending CXR    A/P:   89F PMH dementia, MS, breast CA, found to have new anemia as outpatient, labs 2 days prior to admission  w/ 6.8 Hgb and melena in stool.     #Hypoxia  #Tachypnea  Pt was satting 95% on RA on arrival to ED. Desatted as above to 77%, then back up to 100% on 15L NRB. Tachycardic to 105, tachypneic to 30, afebrile (though high normal). EKG reportedly with ischemic changes and troponin .08.  Also with underlying hypoxia 2/2 anemia from GIB.  Differential includes transfusion reaction, pulmonary embolism, atelectasis, MI.  - f/u CXR to evaluate for pulmonary edema and need for diuresis  - transfusion reaction labwork if in line with GOC especially given pt's anemia to Hgb 6.2  - send d-dimer if in line with GOC to rule out pulmonary embolism as acute source of hypoxia  - consult palliative care to assist with GOC and family support    #AMS  Pt is prescribed multiple drugs that could affect mental status, especially given her age. These drugs include seroquel 75mg qHS, trazodone 50mg qHS, xanax 0.5mg PO qHS, fluoxetine 10mg PO x1, buproprion 450mg/24 hrs oral tab, and armodafinil 250mg PO x1.  - wean sedatives as tolerated  - attempt redirection, and one-to-one to monitor as she was trying to remove oxygen mask  - consider haldol for increased agitation if above conservative measures are unsuccessful    **Rest of care per primary team Summary of Events: Approximately 1 hour into pRBC transfusion, pt developed hypoxia to 77, for which rapid response was called ~12:10pm. Pt was placed on 15L NRB, blood transfusion was stopped. On arrival, she was agitated, thrashing in bed and expressing frustration with staff.     Vitals: T 99.5, /79, , RR 30, O2 sat 77 -> 100% on 15L NRB. Repeat vitals: 1) 12:21 pm: /111  RR 30. 2) 12:38pm: 141/71  RR 30. Daughter was called by ED staff, who said she was on her way.   Exam:   General: agitated, combative with staff  Lungs: predominantly CTAB with scattered crackles  Cardiac: tachycardia  GI: pt denied any abd pain. Rest of exam declined by patient.  Extremities: warm  Labs: POCT glucose 136. Labwork including transfusion reaction workup held per primary team and ED discussion pending further clarification of GOC with family.    Imaging:  - Pending repeat CXR  - CXR on admission: impression: Bilateral opacities/pleural effusions. Heart size within normal limits, thoracic aortic and mitral annulus calcification. . Stable bony structures. Thoracic vertebral body compression fractures, dextroscoliosis.  - CTA: IMPRESSION: 1.  No pulmonary embolism. 2.  Bilateral, bibasilar focal atelectasis and/or pneumonia. Diffuse increased vascular markings bilaterally with areas of airspace haziness, cannot exclude superimposed congestive heart failure. 3.  Severe bilateral pleural effusions    A/P:   89F PMH dementia, MS, breast CA, found to have new anemia as outpatient, labs 2 days prior to admission  w/ 6.8 Hgb and melena in stool.     #Hypoxia  #Tachypnea  Pt was satting 95% on RA on arrival to ED. Desatted as above to 77%, then back up to 100% on 15L NRB. Tachycardic to 105, tachypneic to 30, afebrile (though high normal). EKG reportedly with ischemic changes and troponin .08.  Also with underlying hypoxia 2/2 anemia from GIB.  Differential includes transfusion reaction, atelectasis, MI. Less likely PE given negative CTA.  - f/u CXR to evaluate for pulmonary edema and need for diuresis  - transfusion reaction labwork if in line with GOC especially given pt's anemia to Hgb 6.2  - consult palliative care to assist with GOC and family support    #AMS  Pt is prescribed multiple drugs that could affect mental status, especially given her age and dementia. These drugs include seroquel 75mg qHS, trazodone 50mg qHS, xanax 0.5mg PO qHS, fluoxetine 10mg PO x1, buproprion 450mg/24 hrs oral tab, and armodafinil 250mg PO x1.  - wean sedatives as tolerated  - attempt redirection, and one-to-one to monitor as she was trying to remove oxygen mask  - consider haldol for increased agitation if above conservative measures are unsuccessful    **Rest of care per primary team Summary of Events: Patient is an 89F PMH dementia, MS, breast CA, found to have new anemia as outpatient as labs 2 days prior to admission w/ 6.8 Hgb and melena in stool. She presented today for further workup and treatment, and was started on a pRBC transfusion. Approximately 1 hour into the transfusion, pt developed hypoxia to 77%, for which rapid response was called ~12:10pm. Pt was placed on 15L NRB, blood transfusion was stopped. On arrival, she was agitated, thrashing in bed and expressing frustration with staff.     Vitals: T 99.5, /79, , RR 30, O2 sat 77 -> 100% on 15L NRB. Repeat vitals: 1) 12:21 pm: /111  RR 30. 2) 12:38pm: 141/71  RR 30. Daughter was called by ED staff, who said she was on her way.   Exam:   General: agitated, combative with staff  Lungs: predominantly CTAB with scattered crackles  Cardiac: tachycardia  GI: pt denied any abd pain. Rest of exam declined by patient.  Extremities: warm    Labs: POCT glucose 136. Labwork including transfusion reaction workup held per primary team and ED discussion pending further clarification of GOC with family.  EKG (3/24/23 at 852am): sinus tachycardia, LBBB (not present on prior EKG 12/10/19), L axis deviation, QTc 516, LVH, ?STD V5, TWI aVL, V5-V6, Q waves III, aVF, V3 (old),   Imaging:  - CXR on admission: impression: Bilateral opacities/pleural effusions. Heart size within normal limits, thoracic aortic and mitral annulus calcification. . Stable bony structures. Thoracic vertebral body compression fractures, dextroscoliosis.  - CTA: IMPRESSION: 1.  No pulmonary embolism. 2.  Bilateral, bibasilar focal atelectasis and/or pneumonia. Diffuse increased vascular markings bilaterally with areas of airspace haziness, cannot exclude superimposed congestive heart failure. 3.  Severe bilateral pleural effusions  - Pending repeat CXR    A/P:   89F PMH dementia, MS, breast CA, found to have new anemia as outpatient, labs 2 days prior to admission  w/ 6.8 Hgb and melena in stool.     #Hypoxia  #Tachypnea  Pt was satting 95% on RA on arrival to ED. Desatted as above to 77%, then back up to 100% on 15L NRB. Tachycardic to 105, tachypneic to 30, afebrile (though high normal). EKG reportedly with possible new ischemic changes and troponin .08, CK WNL.  Also with underlying hypoxia 2/2 anemia from GIB. EKG with new LBBB compared to 2019, which could indicate new MI, but unclear timeline of this LBBB, could have occurred prior. TWI likely secondary repolarization abnormality i/s/o LVH. Q waves III, aVF unclear chronicity.  Differential includes transfusion reaction, atelectasis, MI, worsening pleural effusions, PNA. Less likely PE given negative CTA. Less likely pneumothorax as lung sounds could be heard bilaterally on exam.  - f/u CXR to evaluate for pulmonary edema and need for diuresis  - transfusion reaction labwork if in line with GOC especially given pt's anemia to Hgb 6.2 if considering further anemia treatment  - consult palliative care to assist with GOC and family support  - consider repeat EKG, trend cardiac enzymes, and medical management of possible cardiac ischemia if in line with GOC  - mobilize as able, incentive spiromter and/or chest physiotherapy if pt amenable to help with airway clearance/opening    #AMS  Her agitation appears similar to frequent episodes of agitation per further collateral, though she is prescribed multiple drugs that could affect mental status, especially given her age and dementia. These drugs include seroquel 75mg qHS, trazodone 50mg qHS, xanax 0.5mg PO qHS, fluoxetine 10mg PO x1, buproprion 450mg/24 hrs oral tab, and armodafinil 250mg PO x1.  - wean sedatives as tolerated  - minimize use of benzos to avoid delirium in elderly pt with agitation  - attempt redirection, and one-to-one to monitor as she was trying to remove oxygen mask  - consider haldol for increased agitation if above conservative measures are unsuccessful     **Rest of care per primary team

## 2023-03-24 NOTE — CONSULT NOTE ADULT - PROBLEM SELECTOR RECOMMENDATION 9
.  -Dilaudid 0.5mg IV q2h PRN for Severe Pain or RR >22  -Robinul 0.4mg IV q4h PRN for Excessive Secretions    Can use supplemental O2 via NC if patient is accepting  Continue daily assessment of volume status to decide if further diuresis with IV Lasix is necessary

## 2023-03-24 NOTE — H&P ADULT - TIME BILLING
Review of hospital course, labs, vitals, medical records.   Bedside exam and interview    Discussed plan of care with housestaff  Documenting the encounter

## 2023-03-24 NOTE — H&P ADULT - NSHPLABSRESULTS_GEN_ALL_CORE
LABS:                        6.2    7.35  )-----------( 311      ( 24 Mar 2023 07:36 )             19.7     03-24    139  |  103  |  43<H>  ----------------------------<  133<H>  3.9   |  25  |  0.83    Ca    9.0      24 Mar 2023 07:36    TPro  6.3  /  Alb  3.6  /  TBili  0.2  /  DBili  x   /  AST  18  /  ALT  7<L>  /  AlkPhos  87  03-24      PT/INR - ( 24 Mar 2023 07:36 )   PT: 11.0 sec;   INR: 0.93          PTT - ( 24 Mar 2023 07:36 )  PTT:32.2 sec  CAPILLARY BLOOD GLUCOSE      POCT Blood Glucose.: 136 mg/dL (24 Mar 2023 12:31)      CARDIAC MARKERS ( 24 Mar 2023 07:36 )  x     / 0.08 ng/mL / 112 U/L / x     / 7.1 ng/mL                      I & O Summary:      Microbiology:        RADIOLOGY, EKG AND ADDITIONAL TESTS: Reviewed.

## 2023-03-26 NOTE — PROGRESS NOTE ADULT - PROBLEM SELECTOR PLAN 3
- Dilaudid 0.5 mg q2h PRN for dyspnea or pain  - Glycopyrrolate 0.4 mg IV QID PRN for secretions
CXR on admission showed bilateral opacities/pleural effusions. CT PE showed no PE, however did show bilateral bibasilar focal atelectasis, diffuse increased vascular markings bilaterally, and severe bilateral pleural effusions. Pt was initially saturating at 95% on RA upon ED admission, however became more tachypenic 1 hour into PRBC transfusion and desaturated down to 77% on RA, requiring placement on 15L NRB. Unclear etiology, possibly 2/2 transfusion reaction. Following GOC discussion with family, decision was made to defer repeat CXR and discontinue supplemental O2.  - Dilaudid 0.5 mg q2h PRN for dyspnea or pain  - Glycopyrrolate 0.4 mg IV QID PRN for secretions

## 2023-03-26 NOTE — PROGRESS NOTE ADULT - PROBLEM SELECTOR PLAN 1
- Per GOC discussion with family, no more labs, no more transfusions.  - considering in-patient hospice on Monday 3/27
Admitted for 2 days of melena and Hgb 6.8 on outpatient labs (6.2 in ED), Started on 1u PRBC transfusion in ED, however stopped after 1 hour due to worsening SOB and hypoxia with concern for transfusion reaction. PRBC transfusion stopped.   - Per GOC discussion with family, no more labs, no more transfusions.  - considering in-patient hospice on Monday 3/27

## 2023-03-26 NOTE — PROGRESS NOTE ADULT - PROBLEM SELECTOR PLAN 5
comfort measures
History of dementia and prior admissions for agitation/AMS. Agitated on arrival to ED. Per family members, mental status at baseline. Home meds: Seroquel 25 mg PO TID, trazodone 50 mg PO daily, fluoxetine 10 mg PO daily, buproprion 450 mg PO daily, armodafinil 250 mg PO daily   - Haldol 1 mg IVP q6h standing & q2h PRN for agitation   - Ativan 1 mg IV QHS standing & q4h PRN for anxiety   - c/w Trazodone 100 mg PO daily   - c/w Buproprion 450 mg PO daily

## 2023-03-26 NOTE — PROGRESS NOTE ADULT - PROBLEM SELECTOR PLAN 7
F: PO  E: No more labs   N: Regular diet   GI ppx: None  DVT ppx: None  Code status: DNR/DNI  Dispo: 7W
F: PO  E: No more labs   N: Regular diet   GI ppx: None  DVT ppx: None  Code status: DNR/DNI  Dispo: 7W

## 2023-03-26 NOTE — PROGRESS NOTE ADULT - PROBLEM SELECTOR PLAN 2
Hgb 6.2, MCV 93.8 on admission, likely i/s/o acute blood loss anemia from UGIB.   - No more labs
Hgb 6.2, MCV 93.8 on admission, likely i/s/o acute blood loss anemia from UGIB.   - No more labs

## 2023-03-26 NOTE — PROGRESS NOTE ADULT - ASSESSMENT
89 F w/ PMH dementia, MS, breast cancer, and prior admission for agitation/AMS, who was admitted for melena and low Hgb seen on outpatient labs, course c/b acute hypoxic respiratory failure during PRBC transfusion in ED, now comfort care.
89 F w/ PMH dementia, MS, breast cancer, and prior admission for agitation/AMS, who was admitted for melena and low Hgb seen on outpatient labs, course c/b acute hypoxic respiratory failure during PRBC transfusion in ED, now comfort care.

## 2023-03-26 NOTE — PROGRESS NOTE ADULT - PROBLEM SELECTOR PLAN 4
- No more labs   - No aggressive interventions or life-sustaining treatment
Admission labs showed Trop 0.08, CKMB 7.1. EKG on admission showed sinus tachycardia, LBBB (not present on prior EKG 12/10/19), LAD, QTc 516, LVH, ?STD V5, TWI aVL, V5-V6, Q waves III, aVF, V3 (old). Concern for myocardial ischemia i/s/o anemia. Aspirin not given due to UGIB.   - No more labs   - No aggressive interventions or life-sustaining treatment

## 2023-03-26 NOTE — PROGRESS NOTE ADULT - SUBJECTIVE AND OBJECTIVE BOX
SUBJECTIVE:  Patient seen and examined at bedside. No acute complaints. Was removing venti mask overnight.     Vital Signs Last 12 Hrs  T(F): 98.1 (03-26-23 @ 06:32), Max: 98.1 (03-26-23 @ 06:32)  HR: 107 (03-26-23 @ 06:32) (107 - 107)  BP: 97/59 (03-26-23 @ 06:32) (97/59 - 97/59)  BP(mean): --  RR: 22 (03-26-23 @ 06:32) (22 - 22)  SpO2: 80% (03-26-23 @ 06:32) (80% - 80%)  I&O's Summary      PHYSICAL EXAM:  Gen: elderly female in bed, NAD  HEENT: EOMI, MM dry  Neck: no jvd  Lungs: non labored  CV: RRR s1s2  GI: +BS non tender  LE: no edema  Psych: comfortable appearing        LABS:                  RADIOLOGY & ADDITIONAL TESTS:    MEDICATIONS  (STANDING):  buPROPion XL (24-Hour) . 450 milliGRAM(s) Oral every 24 hours  haloperidol    Injectable 1 milliGRAM(s) IV Push every 12 hours  LORazepam   Injectable 1 milliGRAM(s) IV Push at bedtime    MEDICATIONS  (PRN):  acetaminophen     Tablet .. 650 milliGRAM(s) Oral every 6 hours PRN Temp greater or equal to 38C (100.4F), Mild Pain (1 - 3), Moderate Pain (4 - 6)  bisacodyl Suppository 10 milliGRAM(s) Rectal daily PRN Constipation  glycopyrrolate Injectable 0.4 milliGRAM(s) IV Push four times a day PRN Secretions  haloperidol    Injectable 1 milliGRAM(s) IV Push every 2 hours PRN Agitation  HYDROmorphone  Injectable 0.5 milliGRAM(s) IV Push every 2 hours PRN Moderate pain (4-6), Severe pain (7-10), Respiratory rate greater than 22  LORazepam   Injectable 1 milliGRAM(s) IV Push every 4 hours PRN Anxiety  
CHECO RICE 89y Female  MRN#: [id number]   CODE STATUS: DNR/DNI - comfort care      SUBJECTIVE  Patient is a 89y old Female who presents with a chief complaint of Melena (25 Mar 2023 12:44)  Currently admitted to medicine with the primary diagnosis of Anemia due to acute blood loss now comfort care mews exempt    Today is hospital day 1d, and this morning she appears comfortable in bed.     Present Today:           Srinivasan Catheter (X)No/ ()Yes? Indication:          Central Line (X)No/ ()Yes? Indication:          IV Fluids (X)No/ ()Yes? Type:  Rate:  Indication:      OBJECTIVE  PAST MEDICAL & SURGICAL HISTORY  Breast cancer    Multiple sclerosis    Dementia    Bilateral bunions    History of lymph node excision  in axilla, around left breast 40 years ago      Home Meds:  amLODIPine 10 mg oral tablet: 1 tab(s) orally once a day  armodafinil 250 mg oral tablet: 1 tab(s) orally once a day  buPROPion 450 mg/24 hours (XL) oral tablet, extended release: 1 tab(s) orally every 24 hours  omeprazole 40 mg oral delayed release capsule: 1 cap(s) orally once a day  QUEtiapine 25 mg oral tablet: 1 tab(s) orally once a day  rosuvastatin 5 mg oral tablet: 1 tab(s) orally once a day  traZODone 100 mg oral tablet: 1 tab(s) orally once a day (at bedtime)  Xanax 0.5 mg oral tablet: orally once a day (at bedtime)    ALLERGIES:  No Known Allergies    MEDICATIONS:  STANDING MEDICATIONS  buPROPion XL (24-Hour) . 450 milliGRAM(s) Oral every 24 hours  haloperidol    Injectable 1 milliGRAM(s) IV Push every 12 hours  LORazepam   Injectable 1 milliGRAM(s) IV Push at bedtime  polyethylene glycol 3350 17 Gram(s) Oral daily  senna 2 Tablet(s) Oral at bedtime    PRN MEDICATIONS  acetaminophen     Tablet .. 650 milliGRAM(s) Oral every 6 hours PRN  glycopyrrolate Injectable 0.4 milliGRAM(s) IV Push four times a day PRN  haloperidol    Injectable 1 milliGRAM(s) IV Push every 2 hours PRN  HYDROmorphone  Injectable 0.5 milliGRAM(s) IV Push every 2 hours PRN  LORazepam   Injectable 1 milliGRAM(s) IV Push every 4 hours PRN      VITAL SIGNS: Last 24 Hours  T(C): 36.5 (24 Mar 2023 20:49), Max: 36.5 (24 Mar 2023 20:49)  T(F): 97.7 (24 Mar 2023 20:49), Max: 97.7 (24 Mar 2023 20:49)  HR: 95 (24 Mar 2023 20:49) (95 - 95)  BP: 128/62 (24 Mar 2023 20:49) (128/62 - 128/62)  BP(mean): --  RR: 16 (24 Mar 2023 20:49) (16 - 16)  SpO2: 85% (24 Mar 2023 20:49) (85% - 85%)    LABS:                        6.2    7.35  )-----------( 311      ( 24 Mar 2023 07:36 )             19.7     03-24    139  |  103  |  43<H>  ----------------------------<  133<H>  3.9   |  25  |  0.83    Ca    9.0      24 Mar 2023 07:36    TPro  6.3  /  Alb  3.6  /  TBili  0.2  /  DBili  x   /  AST  18  /  ALT  7<L>  /  AlkPhos  87  03-24    PT/INR - ( 24 Mar 2023 07:36 )   PT: 11.0 sec;   INR: 0.93          PTT - ( 24 Mar 2023 07:36 )  PTT:32.2 sec          CARDIAC MARKERS ( 24 Mar 2023 07:36 )  x     / 0.08 ng/mL / 112 U/L / x     / 7.1 ng/mL      RADIOLOGY:      PHYSICAL EXAM:  Visual exam performed at bedside  pt. appears comfortable  not tachypneic or nor does she appear in pain.

## 2023-03-26 NOTE — PROGRESS NOTE ADULT - PROBLEM SELECTOR PLAN 6
Family wishes for pt to be DNR/DNI. They do not want aggressive life-sustaining treatment or intervention at this time. They do want medications that improve the patient's comfort.   - No more lab draws   - No more medications aside from pain & psych meds   - No more supplemental O2   - f/u palliative care recs
Family wishes for pt to be DNR/DNI. They do not want aggressive life-sustaining treatment or intervention at this time. They do want medications that improve the patient's comfort.   - No more lab draws   - No more medications aside from pain & psych meds   - No more supplemental O2   - f/u palliative care recs

## 2023-03-27 NOTE — PATIENT PROFILE ADULT - DEAF OR HARD OF HEARING?
DATE OF PROCEDURE:  10/19/2017

 

PREOPERATIVE DIAGNOSIS:  Vitreous opacification, right eye.

 

POSTOPERATIVE DIAGNOSIS:  Vitreous opacification, right eye.

 

PROCEDURE:  Pars plana vitrectomy and panretinal photocoagulation, right eye.

 

SURGEON:  Valente Rios M.D.

 

ANESTHESIA:  Local with monitored anesthesia care.

 

PROCEDURE IN DETAIL:  The patient was identified in the preoperative holding area.  Appropriate info
rmed consent for the planned surgical procedure on the right eye had been obtained.  The patient was
 transported to the operative suite where appropriate cardiopulmonary monitoring was established.  L
ocal anesthesia was obtained using retrobulbar block.  The patient was prepped and draped in the usu
al sterile manner for ophthalmic surgery on the right eye.  Lid speculum was placed in the right eye
.  The 25-gauge trocars were placed inferotemporally and supranasally.  A 20-gauge sclerotomy was cr
eated supratemporally and the viscous fluid removal device was inserted into the eye.  Silicone oil 
was aspirated and the sclerotomy was closed with 7-0 Vicryl suture.  Trocar was placed.  The 25-gaug
e trocars were placed through the conjunctiva and sclera and vitreous cutter were inserted into the 
eye.  Residual silicone oil, vitreous opacification was removed.  Panretinal photocoagulation was pl
aced around preexisting retinal holes nasal to the nerve.  Trocars were removed and sclerotomies wer
e sutured closed.  Retrobulbar Kenalog and subconjunctival Ancef were placed.  Atropine and antibiot
ic ointment were placed, and the eye was patched and shielded.  The patient was taken to the postope
rative recovery unit in good condition having suffered no immediate perioperative complications.

 

DISCHARGE INSTRUCTIONS:  The patient was instructed to keep patch and shield on, avoid lifting or be
nding, and follow up in the morning with Dr. Rios.
yes

## 2023-03-27 NOTE — DISCHARGE NOTE PROVIDER - NSDCCPCAREPLAN_GEN_ALL_CORE_FT
PRINCIPAL DISCHARGE DIAGNOSIS  Diagnosis: Comfort measures only status  Assessment and Plan of Treatment: You were admitted to the hospital for acute hypoxic respiratory failure. You were evaluated by the palliative care team to establish the best route of care.      SECONDARY DISCHARGE DIAGNOSES  Diagnosis: Pleural effusion  Assessment and Plan of Treatment:      PRINCIPAL DISCHARGE DIAGNOSIS  Diagnosis: Comfort measures only status  Assessment and Plan of Treatment: You were admitted to the hospital for acute hypoxic respiratory failure. You were evaluated by the palliative care team to establish the best route of care. It is within your wishes to prioritize comfort over aggressive medical interventions.

## 2023-03-27 NOTE — H&P ADULT - NSHPSOURCEINFOTX_GEN_ALL_CORE
[facility] / [facility]: Call [facility] at [phone] or [facility] at [phone] for symptom management or to request interdisciplinary team intervention (RN/CM, SW, ) for patient/family

## 2023-03-27 NOTE — H&P ADULT - ASSESSMENT
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    ·	Call [facility] ([phone]) for any issues 24/7 regarding symptom management.  ·	Call [facility] ([phone]) for symptom management or to request interdisciplinary team intervention (RN/CM, SW, ) for patient/family.

## 2023-03-27 NOTE — H&P ADULT - PROBLEM SELECTOR PLAN 2
.  -Dilaudid 1mg IV q6h ATC  -Dilaudid 1mg IV q2h PRN for Moderate/Severe Pain or RR >22    If patient requires >3 PRNs in 24hrs, then order Dilaudid gtt @ 0.5mg/hr

## 2023-03-27 NOTE — DISCHARGE NOTE PROVIDER - NSDCMRMEDTOKEN_GEN_ALL_CORE_FT
amLODIPine 10 mg oral tablet: 1 tab(s) orally once a day  armodafinil 250 mg oral tablet: 1 tab(s) orally once a day  buPROPion 450 mg/24 hours (XL) oral tablet, extended release: 1 tab(s) orally every 24 hours  omeprazole 40 mg oral delayed release capsule: 1 cap(s) orally once a day  QUEtiapine 25 mg oral tablet: 1 tab(s) orally once a day  rosuvastatin 5 mg oral tablet: 1 tab(s) orally once a day  traZODone 100 mg oral tablet: 1 tab(s) orally once a day (at bedtime)  Xanax 0.5 mg oral tablet: orally once a day (at bedtime)   glycopyrrolate 0.2 mg/mL injectable solution: 0.4 milligram(s) injectable every 4 hours as needed for secretions  haloperidol: 1 milligram(s) intravenous every 12 hours  haloperidol: 1 milligram(s) intravenous every 2 hours as needed for  agitation  HYDROmorphone: 0.5 milligram(s) intravenous every 2 hours as needed for  severe pain  LORazepam: 1 milligram(s) intravenous once a day (at bedtime)  LORazepam: 1 milligram(s) intravenous every 4 hours as needed for  anxiety

## 2023-03-27 NOTE — PATIENT PROFILE ADULT - FUNCTIONAL ASSESSMENT - BASIC MOBILITY 6.
1-calculated by average/Not able to assess (calculate score using [facility] averaging method)

## 2023-03-27 NOTE — H&P ADULT - NSHPLABSRESULTS_GEN_ALL_CORE
LABS:  Pro-Brain Natriuretic Peptide: 2834  Hemoglobin: 6.2    RADIOLOGY & ADDITIONAL STUDIES:  < from: CT Angio Chest PE Protocol w/ IV Cont (03.24.23 @ 10:03) >  LUNGS AND AIRWAYS: Bilateral, bibasilar focal atelectasis and/or pneumonia. Diffuse increased vascular markings bilaterally with areas of airspace haziness, cannot exclude superimposed congestive heart failure.  PLEURA: Severe bilateral pleural effusions  MEDIASTINUM AND NATALIE: No lymphadenopathy.  VESSELS: No pulmonary embolism. No thoracic aortic dissection or aneurysm.  HEART: Heart size is normal. No pericardial effusion.  CHEST WALL AND LOWER NECK: Mild anasarca  VISUALIZED UPPER ABDOMEN: Hiatal hernia  BONES: Degenerative changes    IMPRESSION:  1.  No pulmonary embolism.  2.  Bilateral, bibasilar focal atelectasis and/or pneumonia. Diffuse increased vascular markings bilaterally with areas of airspace haziness, cannot exclude superimposed congestive heart failure.  3.  Severe bilateral pleural effusions

## 2023-03-27 NOTE — H&P ADULT - PROBLEM SELECTOR PLAN 4
.  PPSV = 10%, requires total assistance for all ADLs  -c/w supportive care, turning and positioning

## 2023-03-27 NOTE — DISCHARGE NOTE PROVIDER - HOSPITAL COURSE
89 F w/ PMH dementia, MS, breast cancer, and prior admission for agitation/AMS, who was admitted for melena and low Hgb seen on outpatient labs, course c/b acute hypoxic respiratory failure during PRBC transfusion in ED, now comfort care.        Problem/Plan - 1: Melena.   Admitted for 2 days of melena and Hgb 6.8 on outpatient labs (6.2 in ED), Started on 1u PRBC transfusion in ED, however stopped after 1 hour due to worsening SOB and hypoxia with concern for transfusion reaction. PRBC transfusion stopped.   - Per GOC discussion with family, no more labs, no more transfusions.  - considering in-patient hospice on Monday 3/27.    Problem/Plan - 2: Anemia.   Hgb 6.2, MCV 93.8 on admission, likely i/s/o acute blood loss anemia from UGIB.   - No more labs.    Problem/Plan - 3: Acute respiratory failure with hypoxia.   ·  Plan: Following GOC discussion with family, decision was made to defer repeat CXR and discontinue supplemental O2.  - Dilaudid 0.5 mg q2h PRN for dyspnea or pain  - Glycopyrrolate 0.4 mg IV QID PRN for secretions.    Problem/Plan - 4: Acute myocardial ischemia.   ·  Plan: Admission labs showed Trop 0.08, CKMB 7.1. EKG on admission showed sinus tachycardia, LBBB (not present on prior EKG 12/10/19), LAD, QTc 516, LVH, ?STD V5, TWI aVL, V5-V6, Q waves III, aVF, V3 (old). Concern for myocardial ischemia i/s/o anemia. Aspirin not given due to UGIB.   - No more labs   - No aggressive interventions or life-sustaining treatment.    Problem/Plan - 5:Dementia.    History of dementia and prior admissions for agitation/AMS. Agitated on arrival to ED. Per family members, mental status at baseline. Home meds: Seroquel 25 mg PO TID, trazodone 50 mg PO daily, fluoxetine 10 mg PO daily, buproprion 450 mg PO daily, armodafinil 250 mg PO daily   - Haldol 1 mg IVP q6h standing & q2h PRN for agitation   - Ativan 1 mg IV QHS standing & q4h PRN for anxiety   - c/w Trazodone 100 mg PO daily   - c/w Buproprion 450 mg PO daily.    Problem/Plan - 6: Comfort measures only status.    Family wishes for pt to be DNR/DNI. They do not want aggressive life-sustaining treatment or intervention at this time. They do want medications that improve the patient's comfort.   - No more lab draws   - No more medications aside from pain & psych meds   - No more supplemental O2   - f/u palliative care recs.      Patient was discharged to: in-patient hospice  New medications: meds per palliative  Changes to old medications: Holding all meds not within GOC  Medications that were stopped:   Items to Follow up as outpatient          89 F w/ PMH dementia, MS, breast cancer, and prior admission for agitation/AMS, who was admitted for melena and low Hgb seen on outpatient labs, course c/b acute hypoxic respiratory failure during PRBC transfusion in ED, now comfort care.      Problem/Recommendation - 1: Dyspnea.   ·  Recommendation: .  -Dilaudid 0.5mg IV q2h PRN for Severe Pain or RR >22  -Robinul 0.4mg IV q4h PRN for Excessive Secretions    Can use supplemental O2 via NC if patient is accepting  Continue daily assessment of volume status to decide if further diuresis with IV Lasix is necessary.    Problem/Recommendation - 2: Agitation.   ·  Recommendation: .  -Haldol 1mg IV q12h ATC  -Ativan 1mg IV qhs  -Haldol 1mg IV q2h PRN for Agitation  -Ativan 1mg IV q4h PRN for Anxiety    Replacing home Seroquel and Trazadone with Haldol.    Problem/Recommendation - 3: Acute respiratory failure with hypoxia.   ·  Recommendation: .  In the setting of volume overload  -symptom management as noted above.    Problem/Recommendation - 4: Anemia due to acute blood loss.   ·  Recommendation: .  Evidence of GIB  -no further transfusions, no endoscopic evaluation to be pursued  -supports limited life expectancy.    Problem/Recommendation - 5: Volume overload.   ·  Recommendation: .  -symptom management as noted above  -concern for CHF which supports limited life expectancy.    Problem/Recommendation - 6:Dementia.   ·  Recommendation: .  FAST 7C+, bedbound  -given respiratory failure, acute blood loss anemia, and volume overload, patient is appropriate for hospice.    Problem/Recommendation - 7: Advanced care planning/counseling discussion.   ·  Recommendation: .  Patient is DNR/DNI, MOLST in chart  -CMO, MEWS Exempt  -will discuss hospice further with family after the weekend, depending on clinical status will decide between home vs inpatient hospice    Patient was discharged to: in-patient hospice  New medications: meds per palliative  Changes to old medications: Holding all meds not within GOC  Medications that were stopped:   Items to Follow up as outpatient

## 2023-03-27 NOTE — H&P ADULT - NSHPSOCIALHISTORY_GEN_ALL_CORE
SOCIAL HISTORY:   Significant other/partner:  []  Children:  [x]   Substance hx:  []   Tobacco hx:  []   Alcohol hx: []  Living Situation: [x]Home  []Long term care  []Rehab []Other  Advent/Spiritual practice: Mu-ism; Role of organized L[employer]-day [] important [x] some [] unable to assess  Coping: [] well [x] with difficulty [] poor coping [] unable to assess  Support system: [x] strong [] adequate [] inadequate    ADVANCE DIRECTIVES:    [x]MOLST: DNR/DNI  DECISION MAKER(s):  [x] Health Care Proxy(s)  [] Surrogate(s)  [] Guardian           Name(s)/Phone Number(s): Aurelia, [phone]

## 2023-03-27 NOTE — H&P ADULT - NSHPPHYSICALEXAM_GEN_ALL_CORE
PHYSICAL EXAM:  GENERAL:  [] NAD []Alert [x]Lethargic  []Cachexia  []Unarousable  [x]Verbal  []Non-Verbal  Behavioral:   []Anxiety  [x]Delirium [x]Agitation []Cooperative []Oriented x  HEENT:  []Normal  [] Moist Mucous Membranes [x]Dry mouth   []ET Tube/Trach  []Oral lesions  PULMONARY:   []Clear []Tachypnea  []Audible excessive secretions  []Normal Work of Breathing [x]Labored Breathing  []Rhonchi [x]Crackles []Wheezing  CARDIOVASCULAR:    [x]Regular Rate [x]Regular Rhythm []Irregular []Tachy  []Waylon  GASTROINTESTINAL:  [x]Soft  [x]Distended   [x]+BS  [x]Non tender []Tender  []PEG []OGT/ NGT  Last BM:  GENITOURINARY:  []Normal [] Incontinent   [x]Oliguria/Anuria   []Srinivasan  MUSCULOSKELETAL:   []Normal Extremities  [x]Weakness  [x]Bed/Wheelchair bound []Edema  NEUROLOGIC:   []No focal deficits  [x]Cognitive impairment  [x]Dysphagia []Dysarthria []Paresis [x]Encephalopathic  SKIN:   [x]Normal   []Pressure ulcer(s)  []Rash    Vital Signs Last 24 Hrs  T(C): 36.8 (27 Mar 2023 12:39), Max: 36.8 (27 Mar 2023 12:39)  T(F): 98.3 (27 Mar 2023 12:39), Max: 98.3 (27 Mar 2023 12:39)  HR: 105 (27 Mar 2023 12:39) (105 - 105)  BP: 111/65 (27 Mar 2023 12:39) (111/65 - 111/65)  BP(mean): --  RR: 20 (27 Mar 2023 12:39) (20 - 20)  SpO2: 93% (27 Mar 2023 12:39) (93% - 93%)

## 2023-03-27 NOTE — H&P ADULT - HISTORY OF PRESENT ILLNESS
Hospice GIP Admission    SYMPTOMS REQUIRING GIP LEVEL OF CARE:  [x]Pain  [x]Dyspnea  [x]Anxiety/Agitation  []Nausea  []Active Seizure    HPI: 88yo F with PMH of Dementia, MS, and h/o Breast CA p/w agitation and encephalopathy and found to have acute blood loss anemia complicated by volume overload during PRBC transfusion. Palliative consulted for complex medical decision making and symptom management. 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 Patient is hypoxic and hemodynamically labile and appears to be imminently dying, symptomatic, and requiring IV medications due to unreliable oral route.    [x]Agitation managed by hourly monitoring and titration of IV Haldol and Ativan  [x]Pain/Dyspnea improved as a result of aggressive titration of IV Dilaudid  [x]GIP to manage uncontrolled pain/dyspnea/agitation and continuous titrations of IV Haldol, Dilaudid, and Ativan  [x]Requires frequent skilled nursing assessment for non-verbal signs of pain/dyspnea/agitation through grimacing, groaning, tachypnea, writhing, rigidity  [x]Effectiveness of pain/tachypnea/agitation management is continuously reevaluated hourly to achieve maximal comfort    Comprehensive symptom assessment and justification of GIP level of care as noted. Patient continues to require symptom monitoring through multiple daily bedside assessments and daily intervention through the administration of PRN medications and adjustment of scheduled opiates. See patient's PRN use for the past 24hrs noted below. Extensive time spent discussing care plan with family. No unexpected adverse effects of opiates noted. Plan of care discussed collaboratively with Hospice and Facility staff.    REFERRALS: [x]Hospice [x]Social Work  []Chaplaincy  []Patient/Family Support []Massage Therapy []Music Therapy    DISCUSSION OF CASE: Family - to obtain additional history and to provide emotional support; Hospice Liaison - to discuss plan of care; RN - to discuss symptom burden and regimen adjustment

## 2023-03-27 NOTE — H&P ADULT - PROBLEM SELECTOR PLAN 5
.  FAST 7C+  -patient is hospice eligible and appropriate  -limited life expectancy is supported by acute respiratory failure, acute blood loss anemia, and volume overload

## 2023-03-27 NOTE — H&P ADULT - PROBLEM SELECTOR PLAN 1
.  -Haldol 2mg IV q6h ATC  -Haldol 2mg IV q2h PRN for Agitation  -Ativan 1mg IV q4h PRN for Anxiety    Prioritize Haldol over Ativan based on previous patient response

## 2023-03-27 NOTE — H&P ADULT - NSHPREVIEWOFSYSTEMS_GEN_ALL_CORE
PRESENT SYMPTOMS/REVIEW OF SYSTEMS: [x]Unable to obtain due to poor mentation/encephalopathy  Source if other than patient:  [x]Family   []Team     Pain: [x] yes [] no - see PAINAD  QOL Impact -   Location -                    Aggravating Factors -  Quality -  Radiation -  Timing -  Severity (0-10 scale) -   Minimal Acceptable Level (0-10 scale) -    PAIN AD Score: 5  (Nonverbal Pain Assessment Scale)    Dyspnea:                           []Mild  [x]Moderate []Severe  Anxiety:                             []Mild [x]Moderate []Severe  Fatigue:                             []Mild []Moderate []Severe  Nausea:                             []Mild []Moderate []Severe  Loss of Appetite:              []Mild []Moderate []Severe  Constipation:                    []Mild []Moderate []Severe    Other Symptoms:  [x]All Other Review Of Systems Negative     Palliative Performance Status Version 2:  10%

## 2023-03-27 NOTE — H&P ADULT - PROBLEM SELECTOR PLAN 6
.  Complex symptom management in the setting of  end of life.    Requires GIP for titration of palliative regimen for adequate symptom relief at end of life. Emotional support provided, questions answered.  Active Psychosocial Referrals:  [x]Social Work/Case management []PT/OT []Chaplaincy [x]Hospice  []Patient/Family Support []Holistic RN []Massage Therapy []Music Therapy []Ethics  Coping: [] well [x] with difficulty [] poor coping [] unable to assess  Support system: [x] strong [] adequate [] inadequate    For new or uncontrolled symptoms, please call Palliative Care at [phone] [phone]). The service is available 24/7 (including nights & weekends) to provide symptom management recommendations over the phone as appropriate

## 2023-03-27 NOTE — H&P ADULT - REASON FOR ADMISSION
10/1/2019 - Traumatic hyphemia right eye. Up to lower iris border. IOP elevated. Will therefore continue PF QID, Cyclogyl tid add Timoptic bid. Follow up tomorrow for IOP check. Retina demonstrates some superior commotio, but no tear or hole noted.  10/20/2019 - IOP 16 on Timoptic. Clot consolidation and slightly decreased in size. Recommend continuing current drops an follow up 5 days.  10/7/2019 - Still small clot in inferior angle with cell. Would continue current rx. Follow up 10 days.    Management of Dyspnea and Agitation at End of Life

## 2023-03-28 NOTE — PROGRESS NOTE ADULT - PROBLEM SELECTOR PROBLEM 1
"   08/03/22 0925   Provider Notification   Provider Name/Title Dr. Wilkerson   Method of Notification Phone   Request Evaluate - Remote   Notification Reason Status Update     MD called for update on patient.  NST was reactive at 0700 this am.  She states she is feeling her contractions \"less and less.\"  Two were noted during her NST, but she said they were not painful.  She did not need any insulin after her fasting BS this morning, but did just get 1 until for a BS of 152 after breakfast.  Next NST is due at 1300 and if that is reactive, she doesn't need another one.  MD stopped her procardia so her final dose was 0700 this AM.  She will be 24 hours post her second dose of betamethasone at 1300 this afternoon.  MD would like her to stay on the unit for a few hours after that and if she is still feeling well, she can hopefully discharge to home.    " Agitation

## 2023-03-28 NOTE — PROGRESS NOTE ADULT - PROBLEM SELECTOR PLAN 2
.  -Dilaudid 1mg IV q6h ATC  -Dilaudid 1mg IV q2h PRN for Moderate/Severe Pain or RR >22    If patient requires >3 PRNs in 24hrs, then will order Dilaudid gtt @ 0.5mg/hr

## 2023-03-28 NOTE — PROGRESS NOTE ADULT - PROBLEM SELECTOR PLAN 6
.  Complex symptom management in the setting of  end of life.    Requires GIP for titration of palliative regimen for adequate symptom relief at end of life. Emotional support provided, questions answered.  Active Psychosocial Referrals:  [x]Social Work/Case management []PT/OT []Chaplaincy [x]Hospice  []Patient/Family Support []Holistic RN []Massage Therapy []Music Therapy []Ethics  Coping: [] well [x] with difficulty [] poor coping [] unable to assess  Support system: [x] strong [] adequate [] inadequate    For new or uncontrolled symptoms, please call Palliative Care at [phone] [phone]). The service is available 24/7 (including nights & weekends) to provide symptom management recommendations over the phone as appropriate

## 2023-03-29 NOTE — PROGRESS NOTE ADULT - SUBJECTIVE AND OBJECTIVE BOX
Hospice GIP Daily Documentation  [facility] Geriatrics and Palliative Care / [facility]  Contact Info: Call [facility] at [phone] or [facility] at [phone] for symptom management or to request interdiscplinary team intervention (RN/LUCÍA, AMARIS, ) for patient/family    SYMPTOMS REQUIRING GIP LEVEL OF CARE:  [x]Pain  [x]Dyspnea  [x]Anxiety/Agitation  []Nausea  []Seizure    HPI/INTERVAL EVENTS: Patient less responsive and with audible secretions. Progressing through the dying process. Required multiple additional PRNs to achieve maximal comfort. Dyspnea is managed with aggressive titration of IV Dilaudid. Agitation is improved with hourly monitoring and titration of IV Haldol. GIP remains indicated to manage uncontrolled dyspnea and continuous titrations of IV Dilaudid. Requires frequent skilled nursing assessment for non-verbal signs of pain/dyspnea/agitation through grimacing, groaning, tachypnea, writhing, rigidity.    Overnight events discussed with nursing staff. Comprehensive symptom assessment and justification of GIP level of care as noted. Patient continues to require symptom monitoring through multiple daily bedside assessments and daily intervention through the administration of PRN medications and adjustment of scheduled opiates/opiate infusion. See patient's PRN use for the past 24hrs noted below. Extensive time spent discussing care plan with family. No unexpected adverse effects of opiates noted. Plan of care discussed collaboratively with Hospice and Facility staff.    ADVANCE DIRECTIVES:    [x]MOLST: DNR/DNI    ALLERGIES:  No Known Allergies    MEDICATIONS  (STANDING):  glycopyrrolate Injectable 0.4 milliGRAM(s) IV Push every 6 hours  haloperidol    Injectable 2 milliGRAM(s) IV Push every 6 hours  HYDROmorphone  Injectable 2 milliGRAM(s) IV Push every 6 hours  scopolamine 1 mG/72 Hr(s) Patch 1 Patch Transdermal every 72 hours    MEDICATIONS  (PRN):  acetaminophen  Suppository .. 650 milliGRAM(s) Rectal every 6 hours PRN Temp greater or equal to 38C (100.4F), Mild Pain (1 - 3)  artificial  tears Solution 2 Drop(s) Both EYES every 1 hour PRN Dry Eyes  bisacodyl Suppository 10 milliGRAM(s) Rectal daily PRN Constipation  haloperidol    Injectable 2 milliGRAM(s) IV Push every 2 hours PRN Agitation  HYDROmorphone  Injectable 2 milliGRAM(s) IV Push every 2 hours PRN Moderate pain (4-6), Severe pain (7-10), Respiratory rate greater than 22  LORazepam   Injectable 1 milliGRAM(s) IV Push every 4 hours PRN Anxiety    Analgesic Use (Scheduled and PRNs) for past 24 hours:    haloperidol    Injectable   2 milliGRAM(s) IV Push (03-29-23 @ 06:11)   2 milliGRAM(s) IV Push (03-29-23 @ 00:34)    haloperidol    Injectable   2 milliGRAM(s) IV Push (03-28-23 @ 14:36)    HYDROmorphone  Injectable   1 milliGRAM(s) IV Push (03-29-23 @ 08:38)   1 milliGRAM(s) IV Push (03-28-23 @ 19:44)   1 milliGRAM(s) IV Push (03-28-23 @ 16:35)    HYDROmorphone  Injectable   1 milliGRAM(s) IV Push (03-29-23 @ 06:11)   1 milliGRAM(s) IV Push (03-29-23 @ 00:34)   1 milliGRAM(s) IV Push (03-28-23 @ 18:49)   1 milliGRAM(s) IV Push (03-28-23 @ 12:48)    HYDROmorphone  Injectable   1 milliGRAM(s) IV Push (03-29-23 @ 09:42)    scopolamine 1 mG/72 Hr(s) Patch   1 Patch Transdermal (03-29-23 @ 11:06)      PRESENT SYMPTOMS/REVIEW OF SYSTEMS:  Source if other than patient:  []Family   [x]Team     Pain: [x] yes [] no - see PAINAD  QOL Impact -   Location -                    Aggravating Factors -  Quality -  Radiation -  Timing -  Severity (0-10 scale) -   Minimal Acceptable Level (0-10 scale) -    PAINAD Score: 3    Dyspnea:                           [x]Mild  []Moderate []Severe  Anxiety:                             []Mild []Moderate []Severe  Fatigue:                             []Mild []Moderate []Severe  Nausea:                             []Mild []Moderate []Severe  Loss of Appetite:              []Mild []Moderate []Severe  Constipation:                    []Mild []Moderate []Severe    Other Symptoms:  []All Other Review Of Systems Negative - [x]Unable to obtain due to poor mentation/encephalopathy    Palliative Performance Status Version 2:  10%    PHYSICAL EXAM:  GENERAL:  [] NAD []Alert []Lethargic  []Cachexia  [x]Unarousable  []Minimally Verbal  [x]Non-Verbal  Behavioral:   []Anxiety  [x]Delirium [x]Agitation []Cooperative []Oriented x  HEENT:  []Normal  [] Moist Mucous Membranes [x]Dry mouth   []ET Tube/Trach  []Oral lesions  PULMONARY:   []Clear []Tachypnea  [x]Audible excessive secretions  []Normal Work of Breathing [x]Labored Breathing  []Rhonchi [x]Crackles []Wheezing  CARDIOVASCULAR:    [x]Regular Rate [x]Regular Rhythm []Irregular []Tachy  []Waylon  GASTROINTESTINAL:  [x]Soft  [x]Distended   [x]+BS  [x]Non tender []Tender  []PEG []OGT/ NGT  Last BM:  GENITOURINARY:  []Normal [] Incontinent   [x]Oliguria/Anuria   []Srinivasan  MUSCULOSKELETAL:   []Normal Extremities  [x]Weakness  [x]Bed/Wheelchair bound []Edema  NEUROLOGIC:   []No focal deficits  [x]Cognitive impairment  [x]Dysphagia []Dysarthria []Paresis [x]Encephalopathic  SKIN:   [x]Normal   []Pressure ulcer(s)  []Rash    Vital Signs Last 24 Hrs  T(C): 36.1 (28 Mar 2023 21:00), Max: 36.3 (28 Mar 2023 15:57)  T(F): 97 (28 Mar 2023 21:00), Max: 97.4 (28 Mar 2023 15:57)  HR: 100 (28 Mar 2023 21:00) (100 - 111)  BP: 96/58 (28 Mar 2023 21:00) (96/58 - 101/59)  BP(mean): --  RR: 16 (28 Mar 2023 21:00) (16 - 18)  SpO2: 92% (28 Mar 2023 21:00) (91% - 92%)    Parameters below as of 28 Mar 2023 21:00  Patient On (Oxygen Delivery Method): nasal cannula  O2 Flow (L/min): 2    LABS/IMAGING: None new    REFERRALS: [x]Hospice [x]Social Work  []Chaplaincy  []Patient/Family Support []Massage Therapy []Music Therapy []Holistic RN    DISCUSSION OF CASE: Daughters - to discuss medication changes, provide emotional support, and expected outcomes; Hospice Liaison - to discuss plan of care and symptom regimen; RN - to discuss symptom burden and regimen adjustment
Hospice [facility] Daily Documentation  [facility] Geriatrics and Palliative Care / [facility]  Contact Info: Call [facility] at [phone] or [facility] at [phone] for symptom management or to request interdiscplinary team intervention (RN/LUCÍA, AMARIS, ) for patient/family    SYMPTOMS REQUIRING [facility] LEVEL OF CARE:  [x]Pain  [x]Dyspnea  [x]Anxiety/Agitation  []Nausea  []Seizure    HPI/INTERVAL EVENTS: Patient with intermittent agitation overnight. Routinely removes oxygen, explained to family that if patient is not symptomatic then we should continue to replace it. Found to be in pain this morning based on frequent skilled nursing assessment for non-verbal signs of pain/dyspnea/agitation through grimacing, groaning, tachypnea, writhing, rigidity; received additional Dilaudid PRN to alleviate symptoms. Agitation/Pain/Dyspnea managed by hourly monitoring and titration of IV Dilaudid and IV Haldol. Dyspnea improved as a result of aggressive titration of IV Dilaudid. Effectiveness of pain/tachypnea management is continuously reevaluated hourly to achieve maximal comfort    Overnight events discussed with nursing staff. Comprehensive symptom assessment and justification of GIP level of care as noted. Patient continues to require symptom monitoring through multiple daily bedside assessments and daily intervention through the administration of PRN medications and adjustment of scheduled opiates. See patient's PRN use for the past 24hrs noted below. Extensive time spent discussing care plan with family. No unexpected adverse effects of opiates noted. Plan of care discussed collaboratively with Hospice and Facility staff.    ADVANCE DIRECTIVES:    [x]MOLST: DNR/DNI    ALLERGIES:  No Known Allergies    MEDICATIONS  (STANDING):  haloperidol    Injectable 2 milliGRAM(s) IV Push every 6 hours  HYDROmorphone  Injectable 1 milliGRAM(s) IV Push every 6 hours    MEDICATIONS  (PRN):  acetaminophen  Suppository .. 650 milliGRAM(s) Rectal every 6 hours PRN Temp greater or equal to 38C (100.4F), Mild Pain (1 - 3)  artificial  tears Solution 2 Drop(s) Both EYES every 1 hour PRN Dry Eyes  bisacodyl Suppository 10 milliGRAM(s) Rectal daily PRN Constipation  glycopyrrolate Injectable 0.4 milliGRAM(s) IV Push every 4 hours PRN Secretions  haloperidol    Injectable 2 milliGRAM(s) IV Push every 2 hours PRN Agitation  HYDROmorphone  Injectable 1 milliGRAM(s) IV Push every 2 hours PRN Moderate pain (4-6), Severe pain (7-10), Respiratory rate greater than 22  LORazepam   Injectable 1 milliGRAM(s) IV Push every 4 hours PRN Anxiety    Analgesic Use (Scheduled and PRNs) for past 24 hours:  haloperidol    Injectable   2 milliGRAM(s) IV Push (03-28-23 @ 06:28)   2 milliGRAM(s) IV Push (03-27-23 @ 23:39)  HYDROmorphone  Injectable   1 milliGRAM(s) IV Push (03-28-23 @ 08:37)   1 milliGRAM(s) IV Push (03-27-23 @ 22:26)   1 milliGRAM(s) IV Push (03-27-23 @ 19:11)   1 milliGRAM(s) IV Push (03-28-23 @ 06:28)   1 milliGRAM(s) IV Push (03-27-23 @ 23:39)    PRESENT SYMPTOMS/REVIEW OF SYSTEMS:  Source if other than patient:  []Family   [x]Team     Pain: [x] yes [] no - see PAINAD  QOL Impact -   Location -                    Aggravating Factors -  Quality -  Radiation -  Timing -  Severity (0-10 scale) -   Minimal Acceptable Level (0-10 scale) -    PAINAD Score: 2    Dyspnea:                           [x]Mild  []Moderate []Severe  Anxiety:                             []Mild []Moderate []Severe  Fatigue:                             []Mild []Moderate []Severe  Nausea:                             []Mild []Moderate []Severe  Loss of Appetite:              []Mild []Moderate []Severe  Constipation:                    []Mild []Moderate []Severe    Other Symptoms:  []All Other Review Of Systems Negative - [x]Unable to obtain due to poor mentation/encephalopathy    Palliative Performance Status Version 2:  10%    PHYSICAL EXAM:  GENERAL:  [] NAD []Alert [x]Lethargic  []Cachexia  []Unarousable  [x]Minimally Verbal  []Non-Verbal  Behavioral:   []Anxiety  [x]Delirium [x]Agitation []Cooperative []Oriented x  HEENT:  []Normal  [] Moist Mucous Membranes [x]Dry mouth   []ET Tube/Trach  []Oral lesions  PULMONARY:   []Clear []Tachypnea  []Audible excessive secretions  []Normal Work of Breathing [x]Labored Breathing  []Rhonchi [x]Crackles []Wheezing  CARDIOVASCULAR:    [x]Regular Rate [x]Regular Rhythm []Irregular []Tachy  []Waylon  GASTROINTESTINAL:  [x]Soft  [x]Distended   [x]+BS  [x]Non tender []Tender  []PEG []OGT/ NGT  Last BM:  GENITOURINARY:  []Normal [] Incontinent   [x]Oliguria/Anuria   []Srinivasan  MUSCULOSKELETAL:   []Normal Extremities  [x]Weakness  [x]Bed/Wheelchair bound []Edema  NEUROLOGIC:   []No focal deficits  [x]Cognitive impairment  [x]Dysphagia []Dysarthria []Paresis [x]Encephalopathic  SKIN:   [x]Normal   []Pressure ulcer(s)  []Rash    Vital Signs Last 24 Hrs  T(C): 36.4 (28 Mar 2023 06:28), Max: 36.5 (27 Mar 2023 18:46)  T(F): 97.5 (28 Mar 2023 06:28), Max: 97.7 (27 Mar 2023 18:46)  HR: 107 (28 Mar 2023 06:28) (107 - 110)  BP: 124/63 (28 Mar 2023 06:28) (99/57 - 124/63)  BP(mean): --  RR: 16 (28 Mar 2023 06:28) (16 - 16)  SpO2: 93% (28 Mar 2023 06:28) (90% - 93%)    Parameters below as of 28 Mar 2023 06:28  Patient On (Oxygen Delivery Method): nasal cannula w/ humidification  O2 Flow (L/min): 2    LABS/IMAGING: None new    REFERRALS: [x]Hospice [x]Social Work  []Chaplaincy  []Patient/Family Support []Massage Therapy [x]Music Therapy []Holistic RN    DISCUSSION OF CASE: Daughters - to discuss medication changes, provide emotional support, and expected outcomes; Hospice Liaison - to discuss plan of care and symptom regimen; RN - to discuss symptom burden and regimen adjustment

## 2023-03-29 NOTE — PROGRESS NOTE ADULT - PROBLEM SELECTOR PLAN 5
.  FAST 7C+  -patient is hospice eligible and appropriate  -limited life expectancy is supported by acute respiratory failure, acute blood loss anemia, and volume overload .  PPSV = 10%, requires total assistance for all ADLs  -c/w supportive care, turning and positioning

## 2023-03-29 NOTE — PROGRESS NOTE ADULT - PROBLEM SELECTOR PLAN 1
.  -Haldol 2mg IV q6h ATC  -Haldol 2mg IV q2h PRN for Agitation  -Ativan 1mg IV q4h PRN for Anxiety    Prioritize Haldol over Ativan based on previous patient response .  -Dilaudid 2mg IV q6h ATC (increased from 1mg)  -Dilaudid 2mg IV q2h PRN for Moderate/Severe Pain or RR >22 (increased from 1mg)    If patient requires >2 PRNs in 24hrs, then will order Dilaudid gtt @ 0.5mg/hr

## 2023-03-29 NOTE — PROGRESS NOTE ADULT - REASON FOR ADMISSION
Management of Dyspnea and Agitation at End of Life
Management of Dyspnea and Agitation at End of Life

## 2023-03-29 NOTE — PROGRESS NOTE ADULT - NS ATTEST RISK PROBLEM GEN_ALL_CORE FT
Abrupt Change In Neurological Status  Chronic Illness With Severe Exacerbation/Progression  Prescription Of Parenteral Controlled Substances
Abrupt Change In Neurological Status  Chronic Illness With Severe Exacerbation/Progression  Prescription Of Parenteral Controlled Substances

## 2023-03-29 NOTE — PROGRESS NOTE ADULT - PROBLEM SELECTOR PLAN 6
.  Complex symptom management in the setting of  end of life.    Requires GIP for titration of palliative regimen for adequate symptom relief at end of life. Emotional support provided, questions answered.  Active Psychosocial Referrals:  [x]Social Work/Case management []PT/OT []Chaplaincy [x]Hospice  []Patient/Family Support []Holistic RN []Massage Therapy []Music Therapy []Ethics  Coping: [] well [x] with difficulty [] poor coping [] unable to assess  Support system: [x] strong [] adequate [] inadequate    For new or uncontrolled symptoms, please call Palliative Care at [phone] ([phone]). The service is available 24/7 (including nights & weekends) to provide symptom management recommendations over the phone as appropriate .  FAST 7C+  -patient is hospice eligible and appropriate  -limited life expectancy is supported by acute respiratory failure, acute blood loss anemia, and volume overload

## 2023-03-29 NOTE — DISCHARGE NOTE FOR THE EXPIRED PATIENT - HOSPITAL COURSE
88yo F with PMH of Dementia, MS, and h/o Breast CA p/w agitation and encephalopathy and found to have acute blood loss anemia complicated by volume overload during PRBC transfusion. Palliative consulted for complex medical decision making and symptom management. 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 Patient was hypoxic and hemodynamically labile, and ultimately  on 3/29 at 12:58AM.   88yo F with PMH of Dementia, MS, and h/o Breast CA p/w agitation and encephalopathy and found to have acute blood loss anemia complicated by volume overload during PRBC transfusion. Palliative consulted for complex medical decision making and symptom management. 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 Patient was hypoxic and hemodynamically labile, and ultimately  on 3/29 at 12:58PM.

## 2023-03-29 NOTE — PROGRESS NOTE ADULT - PROBLEM SELECTOR PLAN 7
.  Complex symptom management in the setting of  end of life.    Requires GIP for titration of palliative regimen for adequate symptom relief at end of life. Emotional support provided, questions answered.  Active Psychosocial Referrals:  [x]Social Work/Case management []PT/OT []Chaplaincy [x]Hospice  []Patient/Family Support []Holistic RN []Massage Therapy []Music Therapy []Ethics  Coping: [] well [x] with difficulty [] poor coping [] unable to assess  Support system: [x] strong [] adequate [] inadequate    For new or uncontrolled symptoms, please call Palliative Care at [phone] ([phone]). The service is available 24/7 (including nights & weekends) to provide symptom management recommendations over the phone as appropriate

## 2023-03-29 NOTE — PROGRESS NOTE ADULT - PROBLEM SELECTOR PLAN 4
.  PPSV = 10%, requires total assistance for all ADLs  -c/w supportive care, turning and positioning .  -symptom management as noted above

## 2023-03-29 NOTE — PROGRESS NOTE ADULT - PROBLEM SELECTOR PLAN 2
.  -Dilaudid 2mg IV q6h ATC (increased from 1mg)  -Dilaudid 2mg IV q2h PRN for Moderate/Severe Pain or RR >22 (increased from 1mg)    If patient requires >2 PRNs in 24hrs, then will order Dilaudid gtt @ 0.5mg/hr .  -Robinul 0.4mg IV q6h ATC  -Scopolamine Patch q72hr  -Lasix 40mg IV x1 given fluid overload

## 2023-03-29 NOTE — PROGRESS NOTE ADULT - ASSESSMENT
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    ·	Call [facility] ([phone]) for any issues 24/7 regarding symptom management.  ·	Call [facility] ([phone]) for symptom management or to request interdisciplinary team intervention (RN/CM, SW, ) for patient/family.
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    ·	Call [facility] ([phone]) for any issues 24/7 regarding symptom management.  ·	Call [facility] ([phone]) for symptom management or to request interdisciplinary team intervention (RN/CM, SW, ) for patient/family.

## 2023-03-29 NOTE — PROGRESS NOTE ADULT - PROBLEM SELECTOR PLAN 3
.  -symptom management as noted above .  -Haldol 2mg IV q6h ATC  -Haldol 2mg IV q2h PRN for Agitation  -Ativan 1mg IV q4h PRN for Anxiety    Prioritize Haldol over Ativan based on previous patient response

## 2023-03-30 DIAGNOSIS — Z66 DO NOT RESUSCITATE: ICD-10-CM

## 2023-03-30 DIAGNOSIS — F03.C11 UNSPECIFIED DEMENTIA, SEVERE, WITH AGITATION: ICD-10-CM

## 2023-03-30 DIAGNOSIS — I10 ESSENTIAL (PRIMARY) HYPERTENSION: ICD-10-CM

## 2023-03-30 DIAGNOSIS — Z74.01 BED CONFINEMENT STATUS: ICD-10-CM

## 2023-03-30 DIAGNOSIS — D62 ACUTE POSTHEMORRHAGIC ANEMIA: ICD-10-CM

## 2023-03-30 DIAGNOSIS — J90 PLEURAL EFFUSION, NOT ELSEWHERE CLASSIFIED: ICD-10-CM

## 2023-03-30 DIAGNOSIS — F03.C4 UNSPECIFIED DEMENTIA, SEVERE, WITH ANXIETY: ICD-10-CM

## 2023-03-30 DIAGNOSIS — Z51.5 ENCOUNTER FOR PALLIATIVE CARE: ICD-10-CM

## 2023-03-30 DIAGNOSIS — J96.01 ACUTE RESPIRATORY FAILURE WITH HYPOXIA: ICD-10-CM

## 2023-03-30 DIAGNOSIS — G35 MULTIPLE SCLEROSIS: ICD-10-CM

## 2023-03-30 DIAGNOSIS — Z85.3 PERSONAL HISTORY OF MALIGNANT NEOPLASM OF BREAST: ICD-10-CM

## 2023-03-30 DIAGNOSIS — G93.40 ENCEPHALOPATHY, UNSPECIFIED: ICD-10-CM

## 2023-03-30 DIAGNOSIS — R53.2 FUNCTIONAL QUADRIPLEGIA: ICD-10-CM

## 2023-03-30 DIAGNOSIS — F32.A DEPRESSION, UNSPECIFIED: ICD-10-CM

## 2023-03-30 DIAGNOSIS — I44.7 LEFT BUNDLE-BRANCH BLOCK, UNSPECIFIED: ICD-10-CM

## 2023-03-30 DIAGNOSIS — I51.3 INTRACARDIAC THROMBOSIS, NOT ELSEWHERE CLASSIFIED: ICD-10-CM

## 2023-03-30 DIAGNOSIS — K92.2 GASTROINTESTINAL HEMORRHAGE, UNSPECIFIED: ICD-10-CM

## 2023-04-05 DIAGNOSIS — G93.40 ENCEPHALOPATHY, UNSPECIFIED: ICD-10-CM

## 2023-04-05 DIAGNOSIS — F02.811 DEMENTIA IN OTHER DISEASES CLASSIFIED ELSEWHERE, UNSPECIFIED SEVERITY, WITH AGITATION: ICD-10-CM

## 2023-04-05 DIAGNOSIS — D62 ACUTE POSTHEMORRHAGIC ANEMIA: ICD-10-CM

## 2023-04-05 DIAGNOSIS — J96.01 ACUTE RESPIRATORY FAILURE WITH HYPOXIA: ICD-10-CM

## 2023-04-05 DIAGNOSIS — R53.2 FUNCTIONAL QUADRIPLEGIA: ICD-10-CM

## 2023-04-05 DIAGNOSIS — Z66 DO NOT RESUSCITATE: ICD-10-CM

## 2023-04-05 DIAGNOSIS — G35 MULTIPLE SCLEROSIS: ICD-10-CM

## 2023-04-05 DIAGNOSIS — Z51.5 ENCOUNTER FOR PALLIATIVE CARE: ICD-10-CM

## 2023-04-05 DIAGNOSIS — E87.70 FLUID OVERLOAD, UNSPECIFIED: ICD-10-CM

## 2023-04-05 DIAGNOSIS — G31.1 SENILE DEGENERATION OF BRAIN, NOT ELSEWHERE CLASSIFIED: ICD-10-CM

## 2023-06-02 NOTE — ED PROVIDER NOTE - EYES, MLM
Spoke with pt to convey PCP recommendation. Pt states that she will call back after surgery to set up a Nurse BP check as she does not know how soon she will be up and getting around. Advised pt to call back with any additional concerns. Pt verbalized understanding.    Clear bilaterally, pupils equal, round and reactive to light.

## 2023-12-11 NOTE — BRIEF OPERATIVE NOTE - OPERATION/FINDINGS
Products Recommended: Daily moisturizer with spf. Elta Md lotion , Elta MD daily, Elta MD clear, Cerave AM\\n\\nFor expected Exposure: Elta Pure, Elta Active. Sun Bum Mineral lip balm see dictation Detail Level: Zone General Sunscreen Counseling: Discussed moisturizer with sunscreen should be applied to exposed areas daily, each am,  For any expected exposure a heavier sunscreen should be applied to all exposed areas and be reapplied every two hours while exposed . The sunscreen should be broad spectrum spf 30-50 , UVA/UVB and contain Zinc and Titanium Dioxide. Recommend Elta MD products. Recommend sunprotective clothing . Such as long sleeve UPF protective shirts, wide brim hats, neck covers and sunglasses. Sunscreens should be applied at least 15 minutes prior to expected sun exposure and then every 2 hours after that as long as sun exposure continues. If swimming or exercising sunscreen should be reapplied every 45 minutes to an hour after getting wet or sweating.   I also recommended a  mineral based lip balm with zinc as well. Chemical lip balms should be avoided. Sun protective clothing can be used in lieu of sunscreen but must be worn the entire time you are exposed to the sun's rays.

## 2024-02-08 NOTE — DIETITIAN INITIAL EVALUATION ADULT. - PATIENT MEETS CRITERIA FOR MALNUTRITION
You can access the FollowMyHealth Patient Portal offered by [facility] by registering at the following website: [email or web address]. By joining "[employer]’s FollowMyHealth portal, you will also be able to view your health information using other applications (apps) compatible with our system.
no

## 2024-11-11 NOTE — DIETITIAN INITIAL EVALUATION ADULT. - MALNUTRITION
Thank you for allowing us to participate in your patient's care.
Unable to DX at this time with info able to be obtained, However will follow closely and attempt NPFE on follow up

## 2025-01-03 NOTE — ED PROVIDER NOTE - CPE EDP ENMT NORM
----- Message from César Llanes MD sent at 1/2/2025  4:09 PM CST -----  Normal-cm, recheck all in 1 yr   normal...

## 2025-07-09 NOTE — H&P ADULT - PROBLEM/PLAN-4
[de-identified] : Patient presents for evaluation of bilateral hip pain.  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  She has had an extensive back history and has nerve stimulator has been placed.  She is coming today because she notices that she has difficulty sleeping on her side and pain in both hips.  Her hip symptoms have worsened over the past several months.  They are 5-6 out of 10 in severity
DISPLAY PLAN FREE TEXT

## (undated) DEVICE — DRSG DERMABOND 0.7ML

## (undated) DEVICE — SUT SILK 2-0 12-18"

## (undated) DEVICE — PACK UPPER BODY

## (undated) DEVICE — WARMING BLANKET LOWER ADULT

## (undated) DEVICE — SUT VICRYL 2-0 27" SH

## (undated) DEVICE — GLV 7.5 PROTEXIS (WHITE)

## (undated) DEVICE — SUT MONOCRYL 4-0 18" PS-2

## (undated) DEVICE — SLV COMPRESSION KNEE MED